# Patient Record
Sex: FEMALE | Race: BLACK OR AFRICAN AMERICAN | NOT HISPANIC OR LATINO | Employment: OTHER | URBAN - METROPOLITAN AREA
[De-identification: names, ages, dates, MRNs, and addresses within clinical notes are randomized per-mention and may not be internally consistent; named-entity substitution may affect disease eponyms.]

---

## 2017-01-17 ENCOUNTER — TELEPHONE (OUTPATIENT)
Dept: NEUROLOGY | Facility: CLINIC | Age: 62
End: 2017-01-17

## 2017-01-17 NOTE — TELEPHONE ENCOUNTER
Spoke with staff at [facility] where pt is located. Notified them of need to r/s upcoming appt with Dr. Varghese. New appt day/time confirmed for 1/30/17 @ 10:20am.

## 2017-01-20 ENCOUNTER — TELEPHONE (OUTPATIENT)
Dept: ORTHOPEDICS | Facility: CLINIC | Age: 62
End: 2017-01-20

## 2017-01-30 ENCOUNTER — OFFICE VISIT (OUTPATIENT)
Dept: NEUROLOGY | Facility: CLINIC | Age: 62
End: 2017-01-30
Payer: MEDICARE

## 2017-01-30 ENCOUNTER — TELEPHONE (OUTPATIENT)
Dept: NEUROLOGY | Facility: CLINIC | Age: 62
End: 2017-01-30

## 2017-01-30 VITALS
DIASTOLIC BLOOD PRESSURE: 106 MMHG | SYSTOLIC BLOOD PRESSURE: 177 MMHG | HEIGHT: 65 IN | HEART RATE: 103 BPM | BODY MASS INDEX: 20.33 KG/M2 | WEIGHT: 122 LBS

## 2017-01-30 DIAGNOSIS — R42 VERTIGO, CONSTANT: ICD-10-CM

## 2017-01-30 DIAGNOSIS — F33.0 MILD EPISODE OF RECURRENT MAJOR DEPRESSIVE DISORDER: Primary | ICD-10-CM

## 2017-01-30 DIAGNOSIS — G21.9 SECONDARY PARKINSONISM, UNSPECIFIED SECONDARY PARKINSONISM TYPE: ICD-10-CM

## 2017-01-30 DIAGNOSIS — F03.90 DEMENTIA WITHOUT BEHAVIORAL DISTURBANCE, UNSPECIFIED DEMENTIA TYPE: Chronic | ICD-10-CM
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
## 2017-01-30 PROCEDURE — 99214 OFFICE O/P EST MOD 30 MIN: CPT | Mod: S$PBB,,, | Performed by: PSYCHIATRY & NEUROLOGY

## 2017-01-30 PROCEDURE — 99999 PR PBB SHADOW E&M-EST. PATIENT-LVL III: CPT | Mod: PBBFAC,,, | Performed by: PSYCHIATRY & NEUROLOGY

## 2017-01-30 PROCEDURE — 99213 OFFICE O/P EST LOW 20 MIN: CPT | Mod: PBBFAC | Performed by: PSYCHIATRY & NEUROLOGY

## 2017-01-30 RX ORDER — MIRTAZAPINE 15 MG/1
15 TABLET, FILM COATED ORAL NIGHTLY
Qty: 30 TABLET | Refills: 11 | Status: SHIPPED | OUTPATIENT
Start: 2017-01-30 | End: 2017-02-07 | Stop reason: SDUPTHER

## 2017-01-30 RX ORDER — DULOXETIN HYDROCHLORIDE 30 MG/1
30 CAPSULE, DELAYED RELEASE ORAL DAILY
Refills: 4 | COMMUNITY
Start: 2017-01-21 | End: 2017-08-22

## 2017-01-30 NOTE — PROGRESS NOTES
"Last Name: Cousins   I. Chief Complaints during this visit:   f/u visit for parkinsonism, seizures and possible FTD    History of present illness:   61 y.o. W seen in f/u for dementia and seizures.  Not seen in 2.5 years.  She has been sent back to Neurology by PCP after suffering finger fracture in November from a fall or seizure.  She had not wanted to see me, again, and was set up to see Dr. Varghese today, but arrived 30 minutes late.    Main complaint is dizziness every morning and feeling off balance.    Says she is very depressed and not sure what the cymbalta is doing.    Seizures are controlled as long as she takes the keppra.    Drinks occasionally, but says it would "knock me out" if she drank at night.    No appetite.    Interval history 7/22/14:  59 yo W returns in f/u for above.  She is unaccompanied.  Moving into an assisted living and really stressed by all this.  Falling constantly and "always" spinning.  Has to stop and wait before she stands up in order to get her balance.  No appetite.    Wakes up on floor after presumed seizure; last was 3 days ago.  Hit head pretty bad.  Forgetful.  She takes more keppra than prescribed because it helps "settle me down."  Benzos gave her nausea.    From my note 9/28/12:  57 yo W seen urgently after a "seizure" last week. She says this is the third in one year, all resulting in broken bones. All start with her feeling a "tornado" in her head, then losing control and falling to ground. The other two episodes, she maintained conciousness, but this one last week was in the shower, so she lost conciousness as she struck her head. She awoke some time later in a "pool of blood" but did not manage to call anyone until she called our office the next morning. She refused EMS service and, instead, took a taxi to ER where a broken jaw was stitched up. She was advised that it would need wriring together, but she now says she thinks it will heal on it's own.   She is very " "private person (she restates) and does not want to be "a bother" to anyone. She does recognize that she cannot stay in her current living situation and so is planning on selling home and buying a condo. Refuses to live with anyone or in a home at this time.   She has recently found that 2 sinemet are more effective than one on controlling her tremor.       II. Review of systems As in HPI, otherwise, balance 3 systems reviewed and are negative.   III. Past Medical history: Parkinsonism, possible FTD ; HTN; B12 deficiency (but not adherant/taking supplements)  Family history: no PD or tremor, no gait disorders   Social history: No tob or etoh,  for 30 years,   unexpectedly in .       Current Outpatient Prescriptions   Medication Sig Dispense Refill    b complex vitamins tablet Take 1 tablet by mouth once daily.      cyanocobalamin (VITAMIN B-12) 1000 MCG tablet Take 100 mcg by mouth once daily.        diazepam (VALIUM) 2 MG tablet TAKE 1 TABLET BY MOUTH 3 TIMES A DAY AS NEEDED FOR ANXIETY 90 tablet 4    folic acid (FOLVITE) 800 MCG Tab Take 800 mcg by mouth once daily.        levetiracetam (KEPPRA) 1000 MG tablet TAKE 1 TABLET BY MOUTH TWICE A DAY 60 tablet 6    acetaminophen (TYLENOL) 500 MG tablet Take 1 tablet (500 mg total) by mouth every 6 (six) hours as needed for Pain. 20 tablet 0    duloxetine (CYMBALTA) 30 MG capsule Take 30 mg by mouth once daily.  4    naproxen sodium (ANAPROX) 220 MG tablet Take 220 mg by mouth every 12 (twelve) hours.       No current facility-administered medications for this visit.           Medications as of 2014:  cymbalta 20 qhs  keppra 500 prescribed bid, but takes up to 4/day      Allergies: Per patient/ family report: nkda     Physical Exam   Vitals:    17 1117 17 1147 17 1150   BP: (!) 189/109 (!) 177/100 (!) 177/106   BP Location: Right arm     Patient Position: Sitting Sitting Standing   BP Method: Automatic     Pulse: 103   " "  Weight: 55.3 kg (122 lb)     Height: 5' 5" (1.651 m)          General appearance: thin, well developed, no acute distress????????  -------------------------------------------------------------  Facial Expression:   1: Slight: Minimal masked facies manifested only by decreased frequency of blinking.  ??  Affect: full?????  Orientation to time & place:  Oriented to time, place, person and situation?????  Attention & concentration:  Normal attention span and concentration?????  Memory:  Not tested  Language:   Spontaneous, fluent; able to repeat and name objects?????   Fund of knowledge:  Aware of current events ?????  Speech:   Mild slurring  -------------------------------------------------------  Cranial nerves: normal visual acuity, visual fields full, optic discs not well visualized due small pupils, pupils equal round and reactive, extraocular movements intact, facial sensation intact, face symmetrical, hearing intact to whisper, palate raises midline, shoulder shrug strength normal, tongue protrudes midline. ?????  -------------------------------------------------------  Muscle rigidity:  Neck     2: Mild: Rigidity detected without the activation maneuver, but full range of motion is easily  achieved.   RUE       0: Normal: No rigidity.  LUE      0: Normal: No rigidity.  RLE      0: Normal: No rigidity.  LLE      0: Normal: No rigidity.    --------------------------------------------------------------  Gait:  Slow and cautious, but off-balance.  Low step height?  Freezing of gait:    0: Normal: No freezing.  Pronation/supination hands:         RUE   0: Normal: No problems.        LUE    0: Normal: No problems.  --------------------------------------------------------------  Bradykinesia: 1: Slight: Slight global slowness and poverty of spontaneous movements.  ??  Dyskinesias:  No dyskinesias present.    Amplitude of rest tremor (visibly full body tremulousness):        RUE    1: Slight: Tremor is present but " less than 1 cm in amplitude. .       LUE     1: Slight: Tremor is present but less than 1 cm in amplitude.       RLE      1: Slight: Tremor is present but less than 1 cm in amplitude.       LLE      1: Slight: Tremor is present but less than 1 cm in amplitude.   Lip/Jaw:    1: Slight: Tremor is present but less than 1 cm in amplitude.    Constancy of rest tremor: 4: Severe: Tremor at rest is present > 75% of the entire examination period.   Postural tremor (hands):         RUE    1: Slight: Tremor is present but less than 1 cm in amplitude.        LUE      1: Slight: Tremor is present but less than 1 cm in amplitude.       Action tremor (hands):          RUE     0: Normal: No tremor.       LUE      0: Normal: No tremor.     Arising from chair:   1: Slight: Arising is slower than normal; or may need more than one attempt; or may  need to move forward in the chair to arise. No need to use the arms of the chair.   Posture:  0: Normal: No problems.  --------------------------------------------------------------  Finger Taps:        RUE    Significant apraxia, difficulty coordinating movements.       LUE     0: Normal: No problems.    Hand Movements (open/close):        RUE   Significant apraxia, difficulty coordinating movements       LUE     0: Normal: No problems.    Toe Tapping:       RLE    0: Normal: No problems.       LLE     0: Normal: No problems.    Leg Agility:        RLE    0: Normal: No problems.       LLE     0: Normal: No problems.    Postural stability: not tested (spontaneously off balance)      V. Laboratory/ Radiological Data:    MRI brain 2/8/15:  1.  No evidence of acute infarction, parenchymal hemorrhage, or abnormal enhancement.  2.  Cerebral volume loss, greater than expected for chronological age but similar to the prior examination.              From my note 9/13/11:  7/18/11 Neuropsych testing:  SUMMARY AND RECOMMENDATIONS:   Neuropsychological testing is consistent with moderate dementia  affecting visuospatial/constructional abilities, attention, naming, psychomotor speed, and frontal lobe abilities (verbal fluency, abstract reasoning, mental flexibility), with depression and anxiety. Memory functioning is also affected and varies from mildly to severely impaired on immediate recall and from low average to mildly impaired on delayed recall. Thus there is prominent impairment in frontal lobe abilities in addition to impairment in most other cognitive abilities.     4/14/10 xray right arm: THERE IS A MINOR IRREGULARITY IDENTIFIED INVOLVING THE   ARTICULAR SURFACE OF THE RADIAL HEAD, BUT I BELIEVE THAT THIS RELATES TO   AN OLD HEALED FRACTURE, REPRESENTING AN ABNORMALITY WHICH WAS ACUTE IN   OCTOBER/NOVEMBER OF 2009    From my note 10/18/10:  MRI brain 10/1/10: IMPRESSION:   1. CEREBRAL VOLUME LOSS WHICH IS PROMINENT FOR THE PATIENT'S CHRONOLOGIC   AGE AND WHICH PRIMARILY AFFECTS THE FRONTAL LOBES RESULTING IN PROMINENCE   OF THE CSF SPACES OVER BOTH FRONTAL CONVEXITIES.   2. FLAIR SIGNAL HYPERINTENSITY WITHIN THE PERIVENTRICULAR WHITE MATTER   ABOUT THE FRONTAL LOBES WHICH IS FAVORED TO REPRESENT AGE APPROPRIATE   CHRONIC MICROVASCULAR ISCHEMIC CHANGE.     CT head 9/17/10 (personally reviewed): moderate bilateral frontal and temporal atrophy     10/12/2010   Vitamin B12 693 210-950 pg/ml   Folate 11.8 4.0-24.0 ng/ml   APA Isotype IgG <9.4 0.0-14.9 GPL   APA Isotype IgM <9.4 0.0-12.4 MPL   Myeloperoxidase Antibo <0.2 U   T4, Free 0.84 0.71-1.51 ng/dl   TSH 0.423 0.4-4.0 uIU/ml   T3, Free 2.2 L 2.3-4.2 pg/mL     VI. Medical Decision Making   Diagnosis:  As below     Tests ordered during this visit: none    Assessment:   1.  Imbalance, tremulousness (parkinsonism) but unable to tolerate levodopa.  Worsening dizziness, but she has not had a significant progression in symptoms since seen 2 years ago.    2.  Depression, anorexia, insomnia, uncontrolled.  3. Seizures, controlled  4. Progressive  neurodegenerative disorder with dementia by prior neuropsych testing (2011), but I remain skeptical since last visit due to lack of clear progression.  I suspect that depression, seizures and etoh contribute more to her cognitive problems than FTD.  5.  Hypertension, uncontrolled    Treatment plan:   1. Start remeron  2. Repeat neuropsych (patient declines currently, will think about it).  3. Return to PCP      Follow up: 4 weeks

## 2017-01-30 NOTE — TELEPHONE ENCOUNTER
----- Message from Mateo Cheung sent at 1/30/2017  2:48 PM CST -----  Contact: Pharmacy   States patient called into he pharmacy, saying she is suppose to have prescription called in.     They did not receive request they would like an update.    Call: [phone]

## 2017-01-31 ENCOUNTER — TELEPHONE (OUTPATIENT)
Dept: INTERNAL MEDICINE | Facility: CLINIC | Age: 62
End: 2017-01-31

## 2017-01-31 NOTE — TELEPHONE ENCOUNTER
Caroline states she is returning a call to the office. Caroline states someone named Lacey called stating she needs to call ASAP in regards to pt. No documentation of missed calls. Caroline states she has been unable to contact pt. Caroline would like it noted that she indeed tried to reach out to Lacey concerning pt. Caroline would like to receive calls on her cell phone regarding pt at [phone].   PCP please advise if there are concerns to be addressed.

## 2017-02-01 ENCOUNTER — TELEPHONE (OUTPATIENT)
Dept: NEUROLOGY | Facility: CLINIC | Age: 62
End: 2017-02-01

## 2017-02-01 NOTE — TELEPHONE ENCOUNTER
----- Message from Edwin Rodriguez sent at 2/1/2017  9:38 AM CST -----  Contact: Self [phone]  Patient is calling to get an update on the medication, pls call to update

## 2017-02-06 ENCOUNTER — TELEPHONE (OUTPATIENT)
Dept: ORTHOPEDICS | Facility: CLINIC | Age: 62
End: 2017-02-06

## 2017-02-06 ENCOUNTER — TELEPHONE (OUTPATIENT)
Dept: NEUROLOGY | Facility: CLINIC | Age: 62
End: 2017-02-06

## 2017-02-06 NOTE — TELEPHONE ENCOUNTER
----- Message from Edwin Rodriguez sent at 2/2/2017  8:05 AM CST -----  Contact: Self [phone]  Patient is calling to get an update on the new medication, pls call

## 2017-02-06 NOTE — TELEPHONE ENCOUNTER
----- Message from Willa Martinez sent at 2/6/2017 10:39 AM CST -----  Contact: Patient  Patient called and said she needs an update on what medication Dr. Mccray was going to give her as discussed at their last appointment.    Please call her about this [phone]

## 2017-02-06 NOTE — TELEPHONE ENCOUNTER
Breonna Silva reminded of appointment on 2/7/17 with Dr. JOSE DAVID Kelly w/time and location. Notified of need for xray before OV w/date, time, and location of appts.

## 2017-02-06 NOTE — TELEPHONE ENCOUNTER
----- Message from Edwin Rodriguez sent at 2/2/2017  8:18 AM CST -----  Contact: Cathy with [facility] [phone]        [address] - [place], [place] - [address] [place] [id number]  Phone: [phone] Fax: [phone]  Caller is calling to get an update on medication, pls call     [facility]/pharmacy #[id number] - [place], [place] - [address] [address]  Phone: [phone] Fax: [phone]

## 2017-02-07 RX ORDER — MIRTAZAPINE 15 MG/1
15 TABLET, FILM COATED ORAL NIGHTLY
Qty: 30 TABLET | Refills: 11 | Status: SHIPPED | OUTPATIENT
Start: 2017-02-07 | End: 2018-02-10 | Stop reason: SDUPTHER

## 2017-02-07 NOTE — TELEPHONE ENCOUNTER
----- Message from Marlen Kiran MD sent at 2/6/2017  7:28 PM CST -----  Contact: Patient  [place], I believe this is for you. Marlen  ----- Message -----     From: Manina Heriberto     Sent: 2/6/2017   5:23 PM       To: Qiana IVERSON Staff    Patient would like a call from Dr. Mccray or nurse in regards to refilling a prescription   (KEPPA) 1 mg twice a day  Was told it would be increased in dosage.  Patient has Parkinson.  Still has dizziness, no appetite. Feet are swelling and having a lot of headaches.

## 2017-02-07 NOTE — TELEPHONE ENCOUNTER
----- Message from Edwin Rodriguez sent at 2/6/2017  3:06 PM CST -----  Contact: Self [phone]  Patient is requesting a return call from the nurse about increasing the medication,  pls call ASAP

## 2017-02-21 ENCOUNTER — INITIAL CONSULT (OUTPATIENT)
Dept: ORTHOPEDICS | Facility: CLINIC | Age: 62
End: 2017-02-21
Payer: MEDICARE

## 2017-02-21 ENCOUNTER — HOSPITAL ENCOUNTER (OUTPATIENT)
Dept: RADIOLOGY | Facility: OTHER | Age: 62
Discharge: HOME | End: 2017-02-21
Attending: ORTHOPAEDIC SURGERY
Payer: MEDICARE

## 2017-02-21 VITALS
DIASTOLIC BLOOD PRESSURE: 99 MMHG | SYSTOLIC BLOOD PRESSURE: 156 MMHG | WEIGHT: 121.94 LBS | BODY MASS INDEX: 20.32 KG/M2 | HEIGHT: 65 IN | HEART RATE: 98 BPM

## 2017-02-21 DIAGNOSIS — G56.01 CARPAL TUNNEL SYNDROME OF RIGHT WRIST: Primary | ICD-10-CM

## 2017-02-21 DIAGNOSIS — M24.541 CONTRACTURE OF FINGER JOINT, RIGHT: ICD-10-CM

## 2017-02-21 DIAGNOSIS — T14.8XXA FRACTURE: ICD-10-CM

## 2017-02-21 PROCEDURE — 99999 PR PBB SHADOW E&M-EST. PATIENT-LVL III: CPT | Mod: PBBFAC,,, | Performed by: ORTHOPAEDIC SURGERY

## 2017-02-21 PROCEDURE — 99213 OFFICE O/P EST LOW 20 MIN: CPT | Mod: PBBFAC | Performed by: ORTHOPAEDIC SURGERY

## 2017-02-21 PROCEDURE — 73130 X-RAY EXAM OF HAND: CPT | Mod: 26,RT,, | Performed by: RADIOLOGY

## 2017-02-21 PROCEDURE — 99204 OFFICE O/P NEW MOD 45 MIN: CPT | Mod: S$PBB,,, | Performed by: ORTHOPAEDIC SURGERY

## 2017-02-21 NOTE — MR AVS SNAPSHOT
[facility]  [address]  [address]  [facility] [address]  Phone: [phone]                  Breonna Silva   2017 1:30 PM   Initial consult    Description:  Female : 1955   Provider:  Johana Kelly MD   Department:  [facility]           Reason for Visit     Right Hand - Numbness, Pain           Diagnoses this Visit        Comments    Carpal tunnel syndrome of right wrist    -  Primary            To Do List           Future Appointments        Provider Department Dept Phone    3/3/2017 1:20 PM Jarrell Del Valle MD [facility] [place] [phone]    3/13/2017 10:40 AM Jennifer Mccray MD [facility] [phone]      Goals (5 Years of Data)     None      [facility]     [facility] -  Assistance  Registered nurses in the [facility] provide clinical advisement, health education, appointment booking, and other advisory services.  Call for this free service at [phone].             Medications           Message regarding Medications     Verify the changes and/or additions to your medication regime listed below are the same as discussed with your clinician today.  If any of these changes or additions are incorrect, please notify your healthcare provider.             Verify that the below list of medications is an accurate representation of the medications you are currently taking.  If none reported, the list may be blank. If incorrect, please contact your healthcare provider. Carry this list with you in case of emergency.           Current Medications     acetaminophen (TYLENOL) 500 MG tablet Take 1 tablet (500 mg total) by mouth every 6 (six) hours as needed for Pain.    b complex vitamins tablet Take 1 tablet by mouth once daily.    cyanocobalamin (VITAMIN B-12) 1000 MCG tablet Take 100 mcg by mouth once daily.      diazepam (VALIUM) 2 MG tablet TAKE 1 TABLET BY MOUTH 3 TIMES A DAY AS NEEDED  "FOR ANXIETY    duloxetine (CYMBALTA) 30 MG capsule Take 30 mg by mouth once daily.    folic acid (FOLVITE) 800 MCG Tab Take 800 mcg by mouth once daily.      levetiracetam (KEPPRA) 1000 MG tablet TAKE 1 TABLET BY MOUTH TWICE A DAY    mirtazapine (REMERON) 15 MG tablet Take 1 tablet (15 mg total) by mouth every evening.    naproxen sodium (ANAPROX) 220 MG tablet Take 220 mg by mouth every 12 (twelve) hours.           Clinical Reference Information           Your Vitals Were     BP Pulse Height Weight BMI    156/99 (BP Location: Left arm) 98 5' 5" (1.651 m) 55.3 kg (121 lb 14.6 oz) 20.29 kg/m2      Blood Pressure          Most Recent Value    BP  (!)  156/99      Allergies as of 2/21/2017     No Known Allergies      Immunizations Administered on Date of Encounter - 2/21/2017     None      Orders Placed During Today's Visit      Normal Orders This Visit    Ambulatory Referral to Physical/Occupational Therapy     Future Labs/Procedures Expected by Expires    EMG w/ Ultrasound  As directed 2/21/2018      Language Assistance Services     ATTENTION: Language assistance services are available, free of charge. Please call [phone].      ATENCIÓN: Si blue mcguire, tiene a leblanc disposición servicios gratuitos de asistencia lingüística. Llame al [phone].     CHÚ Ý: N?u b?n nói Ti?ng Vi?t, có các d?ch v? h? tr? ngôn ng? mi?n phí dành cho b?n. G?i s? [phone].         [facility] complies with applicable Federal civil rights laws and does not discriminate on the basis of race, color, national origin, age, disability, or sex.        "

## 2017-02-21 NOTE — LETTER
February 22, 2017      Jarrell Del Valle MD  [address]  [facility] [address]           [facility]  [address]  [address]  [facility] [address]  Phone: [phone]          Patient: Breonna Silva   MR Number: [id number]   YOB: 1955   Date of Visit: 2/21/2017       Dear Dr. Jarrell Del Valle:    Thank you for referring Breonna Silva to me for evaluation. Attached you will find relevant portions of my assessment and plan of care.    If you have questions, please do not hesitate to call me. I look forward to following Breonna Silva along with you.    Sincerely,    Sona Odom PA-C    Enclosure  CC:  No Recipients    If you would like to receive this communication electronically, please contact [email or web address] or [phone] to request more information on [facility] Link access.    For providers and/or their staff who would like to refer a patient to [facility], please contact us through our one-stop-shop provider referral line, [facility], at [phone].    If you feel you have received this communication in error or would no longer like to receive these types of communications, please e-mail [email or web address]

## 2017-02-22 NOTE — PROGRESS NOTES
I have personally taken the history and examined the patient. I agree with the Hand Surgery PA's note. The plan will be OT for right hand. EMG/ NCS for right hand. Pt is not interested in surgical correction of right hand ( 4th MC head) at this time. Would prefer conservative management. Pt will also be scheduled with her internal medicine doctor for other issues.

## 2017-02-22 NOTE — PROGRESS NOTES
This office note has been dictated.   Dictation Confirmation Code: [id number]  Oneida Odom PA-C  02/22/2017  12:40 PM  Supervising Physician:  MD Breonna Campoverde was seen today for numbness and pain.    Diagnoses and all orders for this visit:    Carpal tunnel syndrome of right wrist  -     Ambulatory Referral to Physical/Occupational Therapy  -     EMG w/ Ultrasound; Future    Fracture    Contracture of finger joint, right
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
## 2017-03-03 RX ORDER — LISINOPRIL 10 MG/1
10 TABLET ORAL DAILY
Refills: 3 | COMMUNITY
Start: 2017-01-07 | End: 2017-12-02

## 2017-04-10 RX ORDER — DIAZEPAM 2 MG/1
TABLET ORAL
Qty: 90 TABLET | Refills: 4 | Status: SHIPPED | OUTPATIENT
Start: 2017-04-10 | End: 2017-11-29 | Stop reason: SDUPTHER

## 2017-04-11 NOTE — TELEPHONE ENCOUNTER
----- Message from Sujeychristen De La Torre sent at 4/10/2017  1:09 PM CDT -----  Contact: pt [phone]  Pt called requesting to speak to Dr Mccray. Pt stated that she has some questions about medications.  Increased dizziness, therefore becoming more depressed.    Thanks  le

## 2017-04-11 NOTE — TELEPHONE ENCOUNTER
----- Message from Nathalie Power sent at 4/10/2017 12:34 PM CDT -----  Contact: Patient [phone]  Patient is calling to r/s No Show appt she missed on Monday 3/13/17, patient says she is depressed and would like to speak to Dr. Mccray.

## 2017-04-27 ENCOUNTER — PROCEDURE VISIT (OUTPATIENT)
Dept: NEUROLOGY | Facility: CLINIC | Age: 62
End: 2017-04-27
Payer: MEDICARE

## 2017-04-27 DIAGNOSIS — G56.01 CARPAL TUNNEL SYNDROME OF RIGHT WRIST: ICD-10-CM

## 2017-04-27 PROCEDURE — 95886 MUSC TEST DONE W/N TEST COMP: CPT | Mod: 26,S$PBB,, | Performed by: PSYCHIATRY & NEUROLOGY

## 2017-04-27 PROCEDURE — 95909 NRV CNDJ TST 5-6 STUDIES: CPT | Mod: 26,S$PBB,, | Performed by: PSYCHIATRY & NEUROLOGY

## 2017-04-27 PROCEDURE — 95886 MUSC TEST DONE W/N TEST COMP: CPT | Mod: PBBFAC | Performed by: PSYCHIATRY & NEUROLOGY

## 2017-04-27 PROCEDURE — 95909 NRV CNDJ TST 5-6 STUDIES: CPT | Mod: PBBFAC | Performed by: PSYCHIATRY & NEUROLOGY

## 2017-05-05 ENCOUNTER — TELEPHONE (OUTPATIENT)
Dept: NEUROLOGY | Facility: CLINIC | Age: 62
End: 2017-05-05

## 2017-05-05 NOTE — TELEPHONE ENCOUNTER
Left VM requesting callback to discuss concern over meds causing GI upset. Also, spoke with her POA, Ms. Garcia (emergency contact also) and received contact # for [facility] assisted [facility] where the pt is located [phone]. Will await callback form pt.

## 2017-05-05 NOTE — TELEPHONE ENCOUNTER
----- Message from Prudence Mittal sent at 5/4/2017 11:40 AM CDT -----  Contact: Self  Pt is calling because she says she has been vomiting and stomach upset and wants to know if there is anything that can be prescribed for her.    She can be reached at [phone].    Thank you.

## 2017-05-08 ENCOUNTER — NURSE TRIAGE (OUTPATIENT)
Dept: ADMINISTRATIVE | Facility: CLINIC | Age: 62
End: 2017-05-08

## 2017-05-09 ENCOUNTER — TELEPHONE (OUTPATIENT)
Dept: NEUROLOGY | Facility: CLINIC | Age: 62
End: 2017-05-09

## 2017-05-09 NOTE — TELEPHONE ENCOUNTER
----- Message from Valery Castellano sent at 5/8/2017  7:28 PM CDT -----  Contact: pt  Pt called and states she was returning your phone call and would like for you to call her back in regards to the message. Pt can be reached at [phone].

## 2017-05-11 ENCOUNTER — TELEPHONE (OUTPATIENT)
Dept: INTERNAL MEDICINE | Facility: CLINIC | Age: 62
End: 2017-05-11

## 2017-05-11 NOTE — TELEPHONE ENCOUNTER
"----- Message from Mary Ellen Benton sent at 5/11/2017 12:08 PM CDT -----  Contact: Patient herself  X  1st Request  _  2nd Request  _  3rd Request    Who: Breonna Shawmelvinas (mrn# [id number])    Why: Patient called and said, "for the pass few day she's been feeling some kind of way."   I did offer an appointment to be seen today but she refused.  Patient wasn't very clear. Please give a call back at your earliest convenience.   THANKS!    What Number to Call Back:  [phone]    When to Expect a call back: (Before the end of the day)   -- if the call is after 12:00, the call back will be tomorrow.                        "

## 2017-05-29 ENCOUNTER — TELEPHONE (OUTPATIENT)
Dept: ORTHOPEDICS | Facility: CLINIC | Age: 62
End: 2017-05-29

## 2017-05-31 ENCOUNTER — OFFICE VISIT (OUTPATIENT)
Dept: ORTHOPEDICS | Facility: CLINIC | Age: 62
End: 2017-05-31
Payer: MEDICARE

## 2017-05-31 VITALS
RESPIRATION RATE: 16 BRPM | WEIGHT: 121 LBS | HEART RATE: 96 BPM | SYSTOLIC BLOOD PRESSURE: 168 MMHG | DIASTOLIC BLOOD PRESSURE: 110 MMHG | HEIGHT: 65 IN | BODY MASS INDEX: 20.16 KG/M2

## 2017-05-31 DIAGNOSIS — S62.339P: ICD-10-CM

## 2017-05-31 DIAGNOSIS — G90.511 COMPLEX REGIONAL PAIN SYNDROME TYPE 1 OF RIGHT UPPER EXTREMITY: Primary | ICD-10-CM

## 2017-05-31 PROCEDURE — 99214 OFFICE O/P EST MOD 30 MIN: CPT | Mod: S$PBB,,, | Performed by: PHYSICIAN ASSISTANT

## 2017-05-31 PROCEDURE — 99214 OFFICE O/P EST MOD 30 MIN: CPT | Mod: PBBFAC | Performed by: PHYSICIAN ASSISTANT

## 2017-05-31 PROCEDURE — 99999 PR PBB SHADOW E&M-EST. PATIENT-LVL IV: CPT | Mod: PBBFAC,,, | Performed by: PHYSICIAN ASSISTANT

## 2017-06-02 NOTE — PROGRESS NOTES
This office note has been dictated.   Dictation Confirmation Code: [id number]  Oneida Odom PA-C  06/02/2017  12:33 AM  Supervising Physician:  MD Breonna Campoverde was seen today for pain.    Diagnoses and all orders for this visit:    Complex regional pain syndrome type 1 of right upper extremity  -     Ambulatory Referral to Physical/Occupational Therapy  -     Ambulatory Referral to Pain Clinic    Fracture of metacarpal neck of right hand, closed, with malunion, subsequent encounter  -     Ambulatory Referral to Pain Clinic

## 2017-06-05 NOTE — PROGRESS NOTES
"SUBJECTIVE:  Ms. Silva presents today for followup evaluation after her EMG   study of her hand.  She reports that it is no better.  She is here today for   evaluation.  She would like to discuss the traumatic injury in which her   fracture occurred.  She reports that she has throbbing pain in the hand.  She   feels that she cannot use it.  Denies nausea, vomiting, fever or chills.    PHYSICAL EXAMINATION:  Vitals:    05/31/17 1339   BP: (!) 168/110   Pulse: 96   Resp: 16   Weight: 54.9 kg (121 lb)   Height: 5' 5" (1.651 m)   PainSc:   4   PainLoc: Hand       UPPER EXTREMITY EXAM:  There is still significant change in the color and   texture of the skin.  She is unable to make a full fist.  There is deformity   seen at the fourth MCP.  She has very limited range of motion.    ASSESSMENT:  1.  Probable CRPS type 1 of the right hand.  2.  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    EMG:  EMG study shows no electrophysiologic evidence for right median neuropathy   at the wrist.  The tremor noted in the neuro exam is consistent with patient's   diagnosis of Parkinson disease.    PLAN:  Discussed with Ms. Silva. At this time, her EMG does not show any   evidence of carpal tunnel.  At this time due to the traumatic injury of her   hand, we would like to have her evaluated for possible CRPS.  She does need to   begin therapy for range of motion modalities.  She may benefit from a stellate   ganglion block.  As far as the metacarpal neck fracture, we discussed that at   this time due to a shortening and nonunion, likely she would only be able to   benefit from an arthroplasty; however, due to her Parkinson's as well as another   complex issues.  I do not recommend her for surgical intervention.  The patient   agrees that she does not want any surgery.  She will follow up with me in eight   weeks.    SUPERVISING PHYSICIAN:  Johana Kelly M.D.    DICTATED BY:  Sona BASILIO/STEVE  dd: " 06/02/2017 00:36:34 (NIYAHT)  td: 06/02/2017 23:41:35 (SHELBI)  Doc ID   #[id number]  Job ID #[id number]    CC:

## 2017-06-10 RX ORDER — LEVETIRACETAM 1000 MG/1
TABLET ORAL
Qty: 60 TABLET | Refills: 6 | Status: CANCELLED | OUTPATIENT
Start: 2017-06-10

## 2017-06-14 RX ORDER — LEVETIRACETAM 1000 MG/1
1000 TABLET ORAL 2 TIMES DAILY
Qty: 60 TABLET | Refills: 6 | Status: SHIPPED | OUTPATIENT
Start: 2017-06-14 | End: 2018-03-01 | Stop reason: SDUPTHER

## 2017-06-14 NOTE — TELEPHONE ENCOUNTER
----- Message from Indira Curran sent at 6/14/2017 10:00 AM CDT -----  Contact: Viviane with [facility]  [facility] is requesting a refill on Levetiracetam 1000mg    [facility] states this is the 2nd request    Contact number [phone] (Phone)  [phone] (Fax)  Thanks

## 2017-06-22 ENCOUNTER — OFFICE VISIT (OUTPATIENT)
Dept: PAIN MEDICINE | Facility: CLINIC | Age: 62
End: 2017-06-22
Attending: ANESTHESIOLOGY
Payer: MEDICARE

## 2017-06-22 VITALS
RESPIRATION RATE: 18 BRPM | SYSTOLIC BLOOD PRESSURE: 150 MMHG | HEART RATE: 94 BPM | DIASTOLIC BLOOD PRESSURE: 95 MMHG | HEIGHT: 65 IN | TEMPERATURE: 98 F

## 2017-06-22 DIAGNOSIS — G56.41 COMPLEX REGIONAL PAIN SYNDROME TYPE 2 OF RIGHT UPPER EXTREMITY: ICD-10-CM

## 2017-06-22 PROCEDURE — 99999 PR PBB SHADOW E&M-EST. PATIENT-LVL III: CPT | Mod: PBBFAC,,, | Performed by: ANESTHESIOLOGY

## 2017-06-22 PROCEDURE — 99213 OFFICE O/P EST LOW 20 MIN: CPT | Mod: PBBFAC | Performed by: ANESTHESIOLOGY

## 2017-06-22 PROCEDURE — 99204 OFFICE O/P NEW MOD 45 MIN: CPT | Mod: S$PBB,,, | Performed by: ANESTHESIOLOGY

## 2017-06-22 RX ORDER — DULOXETIN HYDROCHLORIDE 60 MG/1
60 CAPSULE, DELAYED RELEASE ORAL DAILY
Qty: 30 CAPSULE | Refills: 11 | Status: SHIPPED | OUTPATIENT
Start: 2017-06-22 | End: 2017-09-18
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
## 2017-06-22 NOTE — LETTER
June 22, 2017      Johana Kelly MD  [address]  [address]  [facility] [place] [address]           [facility]  [address]  [place] [address]  Phone: [phone]  Fax: [phone]          Patient: Breonna Silva   MR Number: [id number]   YOB: 1955   Date of Visit: 6/22/2017       Dear Dr. Johana Kelly:    Thank you for referring Breonna Silva to me for evaluation. Attached you will find relevant portions of my assessment and plan of care.    If you have questions, please do not hesitate to call me. I look forward to following Breonna Silva along with you.    Sincerely,    Brian Atkins MD    Enclosure  CC:  No Recipients    If you would like to receive this communication electronically, please contact [email or web address] or [phone] to request more information on [facility] Link access.    For providers and/or their staff who would like to refer a patient to [facility], please contact us through our one-stop-shop provider referral line, [facility], at [phone].    If you feel you have received this communication in error or would no longer like to receive these types of communications, please e-mail [email or web address]

## 2017-06-29 ENCOUNTER — TELEPHONE (OUTPATIENT)
Dept: PAIN MEDICINE | Facility: CLINIC | Age: 62
End: 2017-06-29

## 2017-06-30 NOTE — TELEPHONE ENCOUNTER
----- Message from Eric Morrow MA sent at 6/22/2017  5:07 PM CDT -----  MD Eric Troy MA         ok   Previous Messages        ----- Message -----   From: Eric Morrow MA   Sent: 6/22/2017   9:41 AM   To: Mirtha Wilkins MD     Spoke with pt in clinic today to establishing care with you. She states her  past away about 3 years ago, she was  for 32 years. Also, her dog past away after that. She states she depressed with Anxiety and no will to be here. She was referred by [facility]. Please advise.

## 2017-07-17 ENCOUNTER — TELEPHONE (OUTPATIENT)
Dept: PAIN MEDICINE | Facility: CLINIC | Age: 62
End: 2017-07-17

## 2017-07-17 ENCOUNTER — OFFICE VISIT (OUTPATIENT)
Dept: PAIN MEDICINE | Facility: CLINIC | Age: 62
End: 2017-07-17
Attending: ANESTHESIOLOGY
Payer: MEDICARE

## 2017-07-17 ENCOUNTER — OFFICE VISIT (OUTPATIENT)
Dept: PAIN MEDICINE | Facility: CLINIC | Age: 62
End: 2017-07-17
Payer: MEDICARE

## 2017-07-17 VITALS — WEIGHT: 119.06 LBS | RESPIRATION RATE: 18 BRPM | BODY MASS INDEX: 19.83 KG/M2 | HEIGHT: 65 IN

## 2017-07-17 VITALS
TEMPERATURE: 99 F | HEIGHT: 65 IN | BODY MASS INDEX: 19.83 KG/M2 | DIASTOLIC BLOOD PRESSURE: 90 MMHG | HEART RATE: 93 BPM | WEIGHT: 119 LBS | SYSTOLIC BLOOD PRESSURE: 148 MMHG

## 2017-07-17 DIAGNOSIS — G56.41 COMPLEX REGIONAL PAIN SYNDROME TYPE 2 OF RIGHT UPPER EXTREMITY: Primary | ICD-10-CM

## 2017-07-17 DIAGNOSIS — F33.0 MDD (MAJOR DEPRESSIVE DISORDER), RECURRENT EPISODE, MILD: Primary | ICD-10-CM

## 2017-07-17 DIAGNOSIS — G89.4 CHRONIC PAIN DISORDER: ICD-10-CM

## 2017-07-17 PROCEDURE — 99204 OFFICE O/P NEW MOD 45 MIN: CPT | Mod: S$PBB,,, | Performed by: PSYCHIATRY & NEUROLOGY

## 2017-07-17 PROCEDURE — 99213 OFFICE O/P EST LOW 20 MIN: CPT | Mod: S$PBB,,, | Performed by: NURSE PRACTITIONER

## 2017-07-17 PROCEDURE — 99999 PR PBB SHADOW E&M-EST. PATIENT-LVL III: CPT | Mod: PBBFAC,,, | Performed by: NURSE PRACTITIONER

## 2017-07-17 PROCEDURE — 99999 PR PBB SHADOW E&M-EST. PATIENT-LVL III: CPT | Mod: PBBFAC,,, | Performed by: PSYCHIATRY & NEUROLOGY

## 2017-07-17 PROCEDURE — 99213 OFFICE O/P EST LOW 20 MIN: CPT | Mod: PBBFAC,27 | Performed by: PSYCHIATRY & NEUROLOGY

## 2017-07-17 PROCEDURE — 99213 OFFICE O/P EST LOW 20 MIN: CPT | Mod: PBBFAC | Performed by: NURSE PRACTITIONER

## 2017-07-17 NOTE — LETTER
July 21, 2017      Brian Atkins MD  [address]  [address]  [facility] [address]           [facility]  [address]  [facility] [address]  Phone: [phone]  Fax: [phone]          Patient: Breonna Silva   MR Number: [id number]   YOB: 1955   Date of Visit: 7/17/2017       Dear Dr. Brian Atkins:    Thank you for referring Breonna Silva to me for evaluation. Attached you will find relevant portions of my assessment and plan of care.    If you have questions, please do not hesitate to call me. I look forward to following Breonna Silva along with you.    Sincerely,    Kapil Laguerre  CC:  No Recipients    If you would like to receive this communication electronically, please contact [email or web address] or [phone] to request more information on [facility] Link access.    For providers and/or their staff who would like to refer a patient to [facility], please contact us through our one-stop-shop provider referral line, [facility], at [phone].    If you feel you have received this communication in error or would no longer like to receive these types of communications, please e-mail [email or web address]

## 2017-07-17 NOTE — TELEPHONE ENCOUNTER
Called and spoke with Loida at [facility] an schedule appt for patient with Dennys Cordova 8/17/17 for weekly therapy per Dr. Wilkins.  Also,schedule appt with Dr. Lerner pt has appointment 7/19/17.
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
## 2017-07-19 ENCOUNTER — TELEPHONE (OUTPATIENT)
Dept: NEUROLOGY | Facility: CLINIC | Age: 62
End: 2017-07-19

## 2017-07-19 NOTE — TELEPHONE ENCOUNTER
----- Message from Edwin Rodriguez sent at 7/19/2017  1:54 PM CDT -----  Contact: Miky with Pharmacy @ [phone]  Caller is calling to get an update on the new medication that should be replacing ( duloxetine (CYMBALTA )     [facility]/pharmacy #[id number] - [address] [address]  Phone: [phone] Fax: [phone]

## 2017-07-19 NOTE — TELEPHONE ENCOUNTER
Spoke to Madalyn who did not have any notes about the medication left by Cathy. Cathy got off at 3pm today. She will leave her a message and have her call us in the tomorrow.

## 2017-07-24 ENCOUNTER — LAB VISIT (OUTPATIENT)
Dept: LAB | Facility: OTHER | Age: 62
End: 2017-07-24
Attending: INTERNAL MEDICINE
Payer: MEDICARE

## 2017-07-24 ENCOUNTER — TELEPHONE (OUTPATIENT)
Dept: INTERNAL MEDICINE | Facility: CLINIC | Age: 62
End: 2017-07-24

## 2017-07-24 ENCOUNTER — OFFICE VISIT (OUTPATIENT)
Dept: INTERNAL MEDICINE | Facility: CLINIC | Age: 62
End: 2017-07-24
Payer: MEDICARE

## 2017-07-24 VITALS
OXYGEN SATURATION: 98 % | SYSTOLIC BLOOD PRESSURE: 112 MMHG | HEIGHT: 65 IN | DIASTOLIC BLOOD PRESSURE: 78 MMHG | HEART RATE: 69 BPM | WEIGHT: 122 LBS | BODY MASS INDEX: 20.33 KG/M2

## 2017-07-24 DIAGNOSIS — G21.9 SECONDARY PARKINSONISM, UNSPECIFIED SECONDARY PARKINSONISM TYPE: ICD-10-CM

## 2017-07-24 DIAGNOSIS — F03.90 DEMENTIA WITHOUT BEHAVIORAL DISTURBANCE, UNSPECIFIED DEMENTIA TYPE: Chronic | ICD-10-CM

## 2017-07-24 DIAGNOSIS — F32.1 MODERATE SINGLE CURRENT EPISODE OF MAJOR DEPRESSIVE DISORDER: ICD-10-CM

## 2017-07-24 DIAGNOSIS — R56.9 SEIZURE: ICD-10-CM

## 2017-07-24 DIAGNOSIS — I10 ESSENTIAL HYPERTENSION, BENIGN: Primary | Chronic | ICD-10-CM

## 2017-07-24 DIAGNOSIS — I10 ESSENTIAL HYPERTENSION, BENIGN: Chronic | ICD-10-CM

## 2017-07-24 LAB
ALBUMIN SERPL BCP-MCNC: 4.5 G/DL
ALP SERPL-CCNC: 80 U/L
ALT SERPL W/O P-5'-P-CCNC: 19 U/L
ANION GAP SERPL CALC-SCNC: 16 MMOL/L
AST SERPL-CCNC: 29 U/L
BASOPHILS # BLD AUTO: 0.01 K/UL
BASOPHILS NFR BLD: 0.2 %
BILIRUB SERPL-MCNC: 0.7 MG/DL
BUN SERPL-MCNC: 10 MG/DL
CALCIUM SERPL-MCNC: 9.1 MG/DL
CHLORIDE SERPL-SCNC: 103 MMOL/L
CO2 SERPL-SCNC: 25 MMOL/L
CREAT SERPL-MCNC: 0.8 MG/DL
DIFFERENTIAL METHOD: ABNORMAL
EOSINOPHIL # BLD AUTO: 0.1 K/UL
EOSINOPHIL NFR BLD: 1.5 %
ERYTHROCYTE [DISTWIDTH] IN BLOOD BY AUTOMATED COUNT: 14.1 %
EST. GFR  (AFRICAN AMERICAN): >60 ML/MIN/1.73 M^2
EST. GFR  (NON AFRICAN AMERICAN): >60 ML/MIN/1.73 M^2
GLUCOSE SERPL-MCNC: 103 MG/DL
HCT VFR BLD AUTO: 36.1 %
HGB BLD-MCNC: 11.9 G/DL
LYMPHOCYTES # BLD AUTO: 0.8 K/UL
LYMPHOCYTES NFR BLD: 17 %
MCH RBC QN AUTO: 33.5 PG
MCHC RBC AUTO-ENTMCNC: 33 G/DL
MCV RBC AUTO: 102 FL
MONOCYTES # BLD AUTO: 0.3 K/UL
MONOCYTES NFR BLD: 6.8 %
NEUTROPHILS # BLD AUTO: 3.4 K/UL
NEUTROPHILS NFR BLD: 74.3 %
PLATELET # BLD AUTO: 220 K/UL
PMV BLD AUTO: 10 FL
POTASSIUM SERPL-SCNC: 3.1 MMOL/L
PROT SERPL-MCNC: 7.6 G/DL
RBC # BLD AUTO: 3.55 M/UL
SODIUM SERPL-SCNC: 144 MMOL/L
WBC # BLD AUTO: 4.54 K/UL

## 2017-07-24 PROCEDURE — 85025 COMPLETE CBC W/AUTO DIFF WBC: CPT

## 2017-07-24 PROCEDURE — 80053 COMPREHEN METABOLIC PANEL: CPT

## 2017-07-24 PROCEDURE — 99214 OFFICE O/P EST MOD 30 MIN: CPT | Mod: S$PBB,,, | Performed by: INTERNAL MEDICINE

## 2017-07-24 PROCEDURE — 99999 PR PBB SHADOW E&M-EST. PATIENT-LVL III: CPT | Mod: PBBFAC,,, | Performed by: INTERNAL MEDICINE

## 2017-07-24 PROCEDURE — 36415 COLL VENOUS BLD VENIPUNCTURE: CPT

## 2017-07-24 NOTE — TELEPHONE ENCOUNTER
Inform pt that labs look good except potassium is a little low. Please have her increase foods with potassium such as citrus fruits, tomatoes, bananas.

## 2017-07-24 NOTE — PROGRESS NOTES
Subjective:       Patient ID: Breonna Silva is a 62 y.o. female.    Chief Complaint: Depression and Foot Swelling    Pt here for f/u. She has parkinson's but has not seen neuro recently. She was supposed to have neuropsych testing but declined at last visit. She has trouble remembering things and has a known dementia syndrome with suspect FTD. She is willing to see neuro again but would like to see someone else. She is living at assisted living facility where she says she administers her own medication.     She has dx Sz disorder. However, sz history is questionable and its relation to etoh intake remains possible. She takes keppra. No sz recently.      She reports depression and is on cymbalta but wonders if this really works. No SI/HI. She is seeing psychiatry.     Pt's BP is well controlled. Tolerating meds well. Pt denies cp/sob/ha/vision or neuro changes.     She had a fall prior to last visit and had soft tissue injury to L ankle but this is improved although she reports it does swell at times. Does not limit her.       Hypertension   Pertinent negatives include no chest pain or shortness of breath.     Review of Systems   Constitutional: Negative for fatigue and unexpected weight change.   Eyes: Negative for visual disturbance.   Respiratory: Negative for shortness of breath.    Cardiovascular: Negative for chest pain.   Gastrointestinal: Negative for blood in stool.   Neurological: Negative for weakness.   Psychiatric/Behavioral: Positive for dysphoric mood.       Objective:      Physical Exam   Constitutional: She is oriented to person, place, and time. She appears well-developed and well-nourished.   Eyes: EOM are normal. Pupils are equal, round, and reactive to light.   Neck: Neck supple. No thyromegaly present.   Cardiovascular: Normal rate, regular rhythm and normal heart sounds.    Pulmonary/Chest: Effort normal and breath sounds normal.   Musculoskeletal: She exhibits no edema.   Lymphadenopathy:      She has no cervical adenopathy.   Neurological: She is alert and oriented to person, place, and time. No cranial nerve deficit.   Psychiatric: Her affect is blunt. Her speech is delayed. She is slowed. Thought content is not paranoid. She expresses no suicidal ideation. She exhibits abnormal recent memory.       Assessment:       1. Essential hypertension, benign    2. Moderate single current episode of major depressive disorder    3. Dementia without behavioral disturbance, unspecified dementia type    4. Secondary Parkinsonism, unspecified secondary Parkinsonism type    5. Seizure        Plan:       1. Keep f/u with specialists including psychiatry  2. Appropriate labs  3. Neuro referral as pt would like to see different neuro for 2nd opinion

## 2017-07-26 ENCOUNTER — TELEPHONE (OUTPATIENT)
Dept: PAIN MEDICINE | Facility: CLINIC | Age: 62
End: 2017-07-26

## 2017-07-26 NOTE — TELEPHONE ENCOUNTER
----- Message from Violeta Lim sent at 7/26/2017 10:40 AM CDT -----  x 1st Request  _ 2nd Request  _ 3rd Request    Who: Breonna     Why: Pt has questions in regards to her procedure with Dr. Atkins on 8/1/17, BLOCK-STELLATE GANGLION. Please call and advise.     What Number to Call Back: [phone]     When to Expect a call back: (Before the end of the day)  -- if call after 3:00 call back will be tomorrow.       Staff returned patient call, no answer, left voicemail message

## 2017-08-01 ENCOUNTER — HOSPITAL ENCOUNTER (OUTPATIENT)
Facility: OTHER | Age: 62
Discharge: HOME | End: 2017-08-01
Attending: ANESTHESIOLOGY | Admitting: ANESTHESIOLOGY
Payer: MEDICARE

## 2017-08-01 VITALS
DIASTOLIC BLOOD PRESSURE: 108 MMHG | RESPIRATION RATE: 18 BRPM | HEIGHT: 64 IN | SYSTOLIC BLOOD PRESSURE: 210 MMHG | TEMPERATURE: 98 F | HEART RATE: 130 BPM | OXYGEN SATURATION: 100 %

## 2017-08-01 DIAGNOSIS — G56.41 COMPLEX REGIONAL PAIN SYNDROME TYPE 2 OF RIGHT UPPER EXTREMITY: Primary | ICD-10-CM

## 2017-08-01 PROCEDURE — 64510 N BLOCK STELLATE GANGLION: CPT | Mod: RT,,, | Performed by: ANESTHESIOLOGY

## 2017-08-01 PROCEDURE — 64510 N BLOCK STELLATE GANGLION: CPT | Performed by: ANESTHESIOLOGY

## 2017-08-01 PROCEDURE — 63600175 PHARM REV CODE 636 W HCPCS: Performed by: ANESTHESIOLOGY

## 2017-08-01 PROCEDURE — 99152 MOD SED SAME PHYS/QHP 5/>YRS: CPT | Mod: ,,, | Performed by: ANESTHESIOLOGY

## 2017-08-01 PROCEDURE — 25000003 PHARM REV CODE 250: Performed by: ANESTHESIOLOGY

## 2017-08-01 PROCEDURE — 76942 ECHO GUIDE FOR BIOPSY: CPT | Performed by: ANESTHESIOLOGY

## 2017-08-01 RX ORDER — LIDOCAINE HYDROCHLORIDE 10 MG/ML
INJECTION, SOLUTION EPIDURAL; INFILTRATION; INTRACAUDAL; PERINEURAL
Status: DISCONTINUED | OUTPATIENT
Start: 1840-12-31 | End: 2017-08-01 | Stop reason: HOSPADM

## 2017-08-01 RX ORDER — SODIUM CHLORIDE 9 MG/ML
INJECTION, SOLUTION INTRAVENOUS CONTINUOUS
Status: DISCONTINUED | OUTPATIENT
Start: 2017-08-01 | End: 2017-08-01 | Stop reason: HOSPADM

## 2017-08-01 RX ORDER — LIDOCAINE HYDROCHLORIDE 10 MG/ML
INJECTION INFILTRATION; PERINEURAL
Status: DISCONTINUED | OUTPATIENT
Start: 2017-08-01 | End: 2017-08-01 | Stop reason: HOSPADM

## 2017-08-01 RX ORDER — MIDAZOLAM HYDROCHLORIDE 1 MG/ML
INJECTION, SOLUTION INTRAMUSCULAR; INTRAVENOUS
Status: DISCONTINUED | OUTPATIENT
Start: 2017-08-01 | End: 2017-08-01 | Stop reason: HOSPADM

## 2017-08-01 RX ORDER — CLONIDINE 100 UG/ML
INJECTION, SOLUTION EPIDURAL
Status: DISCONTINUED | OUTPATIENT
Start: 2017-08-01 | End: 2017-08-01 | Stop reason: HOSPADM

## 2017-08-01 RX ADMIN — SODIUM CHLORIDE: 0.9 INJECTION, SOLUTION INTRAVENOUS at 11:08

## 2017-08-01 NOTE — PLAN OF CARE
Pt tolerated procedure well. Reports 0/10 pain after procedure. Pt assisted up for first time, steady on feet. D/c instructions given. Pt sat up x 45 minutes.  Pt tolerating PO fluids well and instructed on no solid foods x24 hours. Pt and ride verbalized understanding.

## 2017-08-01 NOTE — DISCHARGE INSTRUCTIONS

## 2017-08-01 NOTE — OP NOTE
Stellate ganglion Block Under Ultrasound  Time-out taken to identify patient and procedure side prior to starting the procedure.                     08/01/2017                                              PROCEDURE:   Right  stellate ganglion block under ultrasound    REASON FOR PROCEDURE: Complex regional pain syndrome type 2, affecting unspecified site [G90.50]    PHYSICIAN: Brian Atkins MD  ASSISTANTS: Russell Sosa MD PGY -3     MEDICATIONS INJECTED:  Lidocaine 1% 10 mL    ESTIMATED BLOOD LOSS:  None.    COMPLICATIONS:  None.    TECHNIQUE: Laying in a supine position, the patient was prepped and draped in the usual sterile fashion using ChloraPrep and sterile blue towels.  An ultrasound probe was also sheathed in a  sterile fashion. The level of the cricoid process was palpated and the C6 transverse process was identified under ultrasound, and then tracked down to the C7 transverse process.  The area over the skin was then anesthetized with 1 mL of 1% lidocaine through a 25-gauge needle.  Then a Stimuplex needle  was advanced under ultrasound visualization with care to avoid the vertebral artery and carotid vessels until the anterior border of the  longus coli  muscle was encountered in the area of the sympathetic chain  Once this was confirmed after negative aspiration there was injection of 10 mL of 1% lidocaine in 2 mL increments with repeat aspiration after each injection.  At no point was there any blood aspirated or any paresthesias.  The patient was communicative and able to blink on command throughout the process.    The patient was monitored after the procedure, and sitting upright.  Patient was given post procedure and discharge instructions to follow at home.  We will see the patient back in two weeks or the patient may call to inform of status. The patient was discharged in a stable condition    Conscious sedation provided by M.D    The patient was monitored with continuous pulse oximetry,  EKG, and intermittent blood pressure monitors.  The patient was hemodynamically stable throughout the entire process was responsive to voice, and breathing spontaneously.  Supplemental O2 was provided at 2L/min via nasal cannula.  Patient was comfortable for the duration of the procedure. (See nurse documentation and case log for sedation time)    There was a total of 3mg IV Midazolam was titrated for the procedure

## 2017-08-01 NOTE — DISCHARGE SUMMARY
Discharge Note  Short Stay      SUMMARY     Admit Date: 8/1/2017    Attending Physician: Brian Atkins    Discharge Diagnosis: Complex regional pain syndrome type 1, affecting unspecified site [G90.50]    Discharge Physician: Brian Atkins      Discharge Date: 8/1/2017 12:02 PM     PROCEDURE:   Right  stellate ganglion block under ultrasound    REASON FOR PROCEDURE: Complex regional pain syndrome type 2, affecting unspecified site [G90.50]    Disposition: Home or self care    Patient Instructions:   Current Discharge Medication List      CONTINUE these medications which have NOT CHANGED    Details   b complex vitamins tablet Take 1 tablet by mouth once daily.      diazePAM (VALIUM) 2 MG tablet TAKE 1 TABLET BY MOUTH 3 TIMES A DAY AS NEEDED FOR ANXIETY  Qty: 90 tablet, Refills: 4    Comments: This request is for a new prescription for a controlled substance as required by Federal/State law..      !! duloxetine (CYMBALTA) 30 MG capsule Take 30 mg by mouth once daily.  Refills: 4      !! duloxetine (CYMBALTA) 60 MG capsule Take 1 capsule (60 mg total) by mouth once daily.  Qty: 30 capsule, Refills: 11      folic acid (FOLVITE) 800 MCG Tab Take 800 mcg by mouth once daily.        levetiracetam (KEPPRA) 1000 MG tablet Take 1 tablet (1,000 mg total) by mouth 2 (two) times daily.  Qty: 60 tablet, Refills: 6      lisinopril 10 MG tablet Take 10 mg by mouth once daily.  Refills: 3      mirtazapine (REMERON) 15 MG tablet Take 1 tablet (15 mg total) by mouth every evening.  Qty: 30 tablet, Refills: 11      cyanocobalamin (VITAMIN B-12) 1000 MCG tablet Take 100 mcg by mouth once daily.         !! - Potential duplicate medications found. Please discuss with provider.          Resume home diet and activity
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
## 2017-08-03 ENCOUNTER — TELEPHONE (OUTPATIENT)
Dept: PAIN MEDICINE | Facility: CLINIC | Age: 62
End: 2017-08-03

## 2017-08-03 NOTE — TELEPHONE ENCOUNTER
Called patient in response to her calling about her Cymbalta making her feel sad at which point the patient expressed wanting to kill herself by jumping off of a bridge, kept patient talking on the phone until [place] arrived at patient's home

## 2017-08-17 ENCOUNTER — OFFICE VISIT (OUTPATIENT)
Dept: PSYCHIATRY | Facility: CLINIC | Age: 62
End: 2017-08-17
Payer: MEDICARE

## 2017-08-17 DIAGNOSIS — F33.1 MDD (MAJOR DEPRESSIVE DISORDER), RECURRENT EPISODE, MODERATE: Primary | ICD-10-CM

## 2017-08-17 DIAGNOSIS — F41.9 ANXIETY: ICD-10-CM

## 2017-08-17 PROCEDURE — 99999 PR PBB SHADOW E&M-EST. PATIENT-LVL II: CPT | Mod: PBBFAC,,, | Performed by: SOCIAL WORKER

## 2017-08-17 PROCEDURE — 90791 PSYCH DIAGNOSTIC EVALUATION: CPT | Mod: PBBFAC | Performed by: SOCIAL WORKER

## 2017-08-17 PROCEDURE — 99212 OFFICE O/P EST SF 10 MIN: CPT | Mod: PBBFAC | Performed by: SOCIAL WORKER

## 2017-08-17 PROCEDURE — 90791 PSYCH DIAGNOSTIC EVALUATION: CPT | Mod: S$PBB,,, | Performed by: SOCIAL WORKER
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
## 2017-08-21 ENCOUNTER — TELEPHONE (OUTPATIENT)
Dept: PAIN MEDICINE | Facility: CLINIC | Age: 62
End: 2017-08-21

## 2017-08-21 NOTE — TELEPHONE ENCOUNTER
----- Message from Ras Green sent at 8/21/2017  8:11 AM CDT -----  Contact: Breonna Silva  X_  1st Request  _  2nd Request  _  3rd Request        Who: Breonna Silva    Why: Patient needs to reschedule her appt for tomorrow as she is not feeling well around the same time her appt with Debra Ortega is. Please call back to follow up.    What Number to Call Back: [phone]    When to Expect a call back: (With in 24 hours)

## 2017-08-22 ENCOUNTER — OFFICE VISIT (OUTPATIENT)
Dept: PAIN MEDICINE | Facility: CLINIC | Age: 62
End: 2017-08-22
Payer: MEDICARE

## 2017-08-22 VITALS
SYSTOLIC BLOOD PRESSURE: 129 MMHG | BODY MASS INDEX: 19.83 KG/M2 | WEIGHT: 119.06 LBS | HEART RATE: 96 BPM | TEMPERATURE: 98 F | DIASTOLIC BLOOD PRESSURE: 61 MMHG | RESPIRATION RATE: 18 BRPM | HEIGHT: 65 IN

## 2017-08-22 VITALS
HEART RATE: 96 BPM | TEMPERATURE: 98 F | WEIGHT: 119.06 LBS | BODY MASS INDEX: 19.83 KG/M2 | DIASTOLIC BLOOD PRESSURE: 91 MMHG | SYSTOLIC BLOOD PRESSURE: 129 MMHG | HEIGHT: 65 IN | RESPIRATION RATE: 18 BRPM

## 2017-08-22 DIAGNOSIS — F33.1 MDD (MAJOR DEPRESSIVE DISORDER), RECURRENT EPISODE, MODERATE: Primary | ICD-10-CM

## 2017-08-22 DIAGNOSIS — G56.41 COMPLEX REGIONAL PAIN SYNDROME TYPE 2 OF RIGHT UPPER EXTREMITY: Primary | ICD-10-CM

## 2017-08-22 DIAGNOSIS — G89.4 CHRONIC PAIN DISORDER: ICD-10-CM

## 2017-08-22 PROCEDURE — 3074F SYST BP LT 130 MM HG: CPT | Mod: ,,, | Performed by: PSYCHIATRY & NEUROLOGY

## 2017-08-22 PROCEDURE — 3078F DIAST BP <80 MM HG: CPT | Mod: ,,, | Performed by: PSYCHIATRY & NEUROLOGY

## 2017-08-22 PROCEDURE — 99999 PR PBB SHADOW E&M-EST. PATIENT-LVL III: CPT | Mod: PBBFAC,,, | Performed by: PSYCHIATRY & NEUROLOGY

## 2017-08-22 PROCEDURE — 99213 OFFICE O/P EST LOW 20 MIN: CPT | Mod: S$PBB,,, | Performed by: NURSE PRACTITIONER

## 2017-08-22 PROCEDURE — 3074F SYST BP LT 130 MM HG: CPT | Mod: ,,, | Performed by: NURSE PRACTITIONER

## 2017-08-22 PROCEDURE — 99999 PR PBB SHADOW E&M-EST. PATIENT-LVL III: CPT | Mod: PBBFAC,,, | Performed by: NURSE PRACTITIONER

## 2017-08-22 PROCEDURE — 99213 OFFICE O/P EST LOW 20 MIN: CPT | Mod: S$PBB,,, | Performed by: PSYCHIATRY & NEUROLOGY

## 2017-08-22 PROCEDURE — 3078F DIAST BP <80 MM HG: CPT | Mod: ,,, | Performed by: NURSE PRACTITIONER

## 2017-08-22 PROCEDURE — 99213 OFFICE O/P EST LOW 20 MIN: CPT | Mod: PBBFAC | Performed by: NURSE PRACTITIONER

## 2017-08-22 RX ORDER — ESCITALOPRAM OXALATE 10 MG/1
10 TABLET ORAL DAILY
Qty: 30 TABLET | Refills: 1 | Status: SHIPPED | OUTPATIENT
Start: 2017-08-22 | End: 2017-09-18 | Stop reason: SDUPTHER
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
## 2017-08-22 NOTE — PROGRESS NOTES
Outpatient Psychiatry Follow-Up Visit (MD/NP)    8/22/2017    Clinical Status of Patient:  Outpatient (Ambulatory)    Chief Complaint:  Breonna Silva is a 62 y.o. female who presents today for follow-up of depression and anxiety.  Met with patient.      Interval History and Content of Current Session:  Interim Events/Subjective Report/Content of Current Session: Pt reports that she has not been taking the cymbalta because it is not doing any for her. That she is still not able to do much. She talked about the things that she has been thinking about, her dog, her husbands ashes and that she still has the jaleesa at her house. Talked about her appointment with Dr Cordova and I have encourgaed her to continue to see him. Talked about medications.     Psychotherapy:  · Target symptoms: depression, anxiety   · Why chosen therapy is appropriate versus another modality: patient responds to this modality, evidence based practice  · Outcome monitoring methods: self-report, observation  · Therapeutic intervention type: behavior modifying psychotherapy, supportive psychotherapy  · Topics discussed/themes: relationships difficulties, stress related to medical comorbidities, difficulty managing affect in interpersonal relationships, building skills sets for symptom management, symptom recognition  · The patient's response to the intervention is accepting. The patient's progress toward treatment goals is not progressing.   · Duration of intervention: 30 minutes.    Review of Systems   · PSYCHIATRIC: Pertinant items are noted in the narrative.    Past Medical, Family and Social History: The patient's past medical, family and social history have been reviewed and updated as appropriate within the electronic medical record - see encounter notes.    Compliance: yes    Side effects: tremor    Risk Parameters:  Patient reports no suicidal ideation  Patient reports no homicidal ideation  Patient reports no self-injurious  "behavior  Patient reports no violent behavior    Exam (detailed: at least 9 elements; comprehensive: all 15 elements)   Constitutional  Vitals:  Most recent vital signs, dated less than 90 days prior to this appointment, were reviewed.   Vitals:    08/22/17 1106   BP: (!) 129/91   Pulse: 96   Resp: 18   Temp: 97.9 °F (36.6 °C)   TempSrc: Oral   Weight: 54 kg (119 lb 0.8 oz)   Height: 5' 5" (1.651 m)        General:  age appropriate, casually dressed, neatly groomed     Musculoskeletal  Muscle Strength/Tone:  tremor noted hands   Gait & Station:  non-ataxic     Psychiatric  Speech:  no latency; no press, dysarthia   Mood & Affect:  depressed  congruent and appropriate   Thought Process:  normal and logical, goal-directed   Associations:  intact   Thought Content:  normal, no suicidality, no homicidality, delusions, or paranoia   Insight:  intact   Judgement: behavior is adequate to circumstances   Orientation:  grossly intact   Memory: intact for content of interview   Language: grossly intact   Attention Span & Concentration:  able to focus   Fund of Knowledge:  intact and appropriate to age and level of education     Assessment and Diagnosis   Status/Progress: Based on the examination today, the patient's problem(s) is/are inadequately controlled.  New problems have been presented today.   Co-morbidities are complicating management of the primary condition.  There are no active rule-out diagnoses for this patient at this time.       Impression: Pt is a 61 Y/O woman with multiple medical issues including Seizure disordr, PD, Dementia with      H/O Major depressive disorder.  Unspecified anxiety disorder        Strengths and Liabilities: Strength: Patient accepts guidance/feedback, Strength: Patient is expressive/articulate., Strength: Patient has reasonable judgment.     Treatment Goals:  Specify outcomes written in observable, behavioral terms:   Anxiety: acquiring relapse prevention skills and reducing time spent " worrying (<30 minutes/day)  Depression: acquiring relapse prevention skills, increasing energy, increasing interest in usual activities, increasing motivation, increasing self-reward for positive behaviors (one/day), increasing self-reward for positive thoughts (one/day) and increasing social contacts (three/week)     Treatment Plan/Recommendations:   · Medication Management: Continue current medications. The risks and benefits of medication were discussed with the patient.  · Referral for further treatment to social work team for psychotherapy  · The treatment plan and follow up plan were reviewed with the patient.   ·  Will D/C  cymbalta , she has not been taking it.  · She is also on mirtazepine 15 mg at bedtime,it helps and she will continue this  · That she is complaint with her medications, no side effects.     Will try lexapro 5 mg in the evening.   · Reviewed chart.  · Pt has no current comliant with medications today.  · Will not make any changes today.  · Cont to assess.  · Discussed coping skills.  · Provided supportive psychotherapy.      ·       Return to Clinic: 1 month

## 2017-09-07 ENCOUNTER — TELEPHONE (OUTPATIENT)
Dept: PAIN MEDICINE | Facility: CLINIC | Age: 62
End: 2017-09-07

## 2017-09-07 NOTE — TELEPHONE ENCOUNTER
----- Message from Brian Atkins MD sent at 9/7/2017  4:27 PM CDT -----  Please discontinue the cymbata.      ----- Message -----  From: Eric Morrow MA  Sent: 9/7/2017   2:00 PM  To: Brian Atkins MD     Patient states the Cymbalta that Dr. Atkins gave her is not helping with pain and it makes her feel like jumping off a bridge. Please advise.    ----- Message -----  From: Mirtha Wilkins MD  Sent: 9/7/2017   1:03 PM  To: Eric Morrow MA    Attempted to call pt but she was not there.   ----- Message -----  From: Eric Morrow MA  Sent: 9/7/2017  11:50 AM  To: Mirtha Wilkins MD    Patient called today about her not able to get a therapy group with Dennys Cordova. We called and spoke with Sachi about the appt and pt schedule for September 15, 2017 for therapy group sessions. Patient states the Cymbalta tat Dr. Atkins gave is not helping with pain and it makes her feel like jumping off a bridge.   Also, the Lexapro works as far as sleeping but she thinks she sleeps to much an then it seams like she's sleep walking. Please call back or tell me what to do?  Please advise.

## 2017-09-13 ENCOUNTER — TELEPHONE (OUTPATIENT)
Dept: PAIN MEDICINE | Facility: CLINIC | Age: 62
End: 2017-09-13

## 2017-09-13 NOTE — TELEPHONE ENCOUNTER
----- Message from Libra Culp LPN sent at 9/13/2017 12:19 PM CDT -----  Contact: Patient herself  Patient calling to schedule injection    Note below from LOV with LUCIO Richardson 08/22/2017    - Schedule for C7-T1 IL SAIDA. The procedure, risks, benefits and options were discussed with patient. There are no contraindications to the procedure. The patient expressed understanding and agreed to proceed.  Consent obtained today.    ----- Message -----  From: aMry Ellen Benton  Sent: 9/13/2017   8:19 AM  To: Wilbert Torres Staff    X 1st Request  _  2nd Request  _  3rd Request    Who: Breonna Silva (mrn# [id number])    Why: Patient called requesting to schedule her procedure.  Please give a call back at your earliest convenience.        THANKS!    What Number to Call Back: [phone]    When to Expect a call back: (With in 24 hours)

## 2017-09-13 NOTE — TELEPHONE ENCOUNTER
Left voice message asking for a return call to schedule patient for a C7-T1 IL Carlos with Dr. Atkins.

## 2017-09-15 ENCOUNTER — TELEPHONE (OUTPATIENT)
Dept: PAIN MEDICINE | Facility: CLINIC | Age: 62
End: 2017-09-15

## 2017-09-15 NOTE — TELEPHONE ENCOUNTER
----- Message from Laverne Morrow MA sent at 9/14/2017  5:58 PM CDT -----  Message   Received: Today   Message Contents   MD Laverne Troy MA         Ok, ask her to discontinue it Laverne   Previous Messages        ----- Message -----   From: Laverne Morrow MA   Sent: 9/13/2017   4:32 PM   To: Mirtha Wilkins MD     Called and spoke with her about her Lexapro she sleep walking and she lose a day. Pt states she loose a whole day and wake up the next day. Pt states she falling asleep she states her  notice her sleeping down in the chair. So she's schedule to see you on Monday 18, 2017 and Breonna Orr at 1:pm Monday.   ----- Message -----   From: Violeta Lim   Sent: 9/13/2017   4:01 PM   To: Claudette Shannon Staff     x 1st Request   _ 2nd Request   _ 3rd Request     Who: pt     Why: Pt is calling to speak to nurse in regards to medication, Lexpro. Please call and advise.       What Number to Call Back: [phone]       When to Expect a call back: (Before the end of the day)   -- if call after 3:00 call back will be tomorrow.

## 2017-09-18 ENCOUNTER — OFFICE VISIT (OUTPATIENT)
Dept: PAIN MEDICINE | Facility: CLINIC | Age: 62
End: 2017-09-18
Payer: MEDICARE

## 2017-09-18 ENCOUNTER — TELEPHONE (OUTPATIENT)
Dept: NEUROLOGY | Facility: CLINIC | Age: 62
End: 2017-09-18

## 2017-09-18 VITALS
HEART RATE: 87 BPM | BODY MASS INDEX: 19.83 KG/M2 | HEIGHT: 65 IN | WEIGHT: 119 LBS | SYSTOLIC BLOOD PRESSURE: 137 MMHG | DIASTOLIC BLOOD PRESSURE: 87 MMHG | TEMPERATURE: 98 F

## 2017-09-18 VITALS
SYSTOLIC BLOOD PRESSURE: 137 MMHG | RESPIRATION RATE: 18 BRPM | DIASTOLIC BLOOD PRESSURE: 87 MMHG | TEMPERATURE: 98 F | HEIGHT: 65 IN | WEIGHT: 119.06 LBS | HEART RATE: 87 BPM | BODY MASS INDEX: 19.83 KG/M2

## 2017-09-18 DIAGNOSIS — F43.10 PTSD (POST-TRAUMATIC STRESS DISORDER): Primary | ICD-10-CM

## 2017-09-18 DIAGNOSIS — G56.41 COMPLEX REGIONAL PAIN SYNDROME TYPE 2 OF RIGHT UPPER EXTREMITY: Primary | ICD-10-CM

## 2017-09-18 DIAGNOSIS — M54.12 CERVICAL RADICULOPATHY: ICD-10-CM

## 2017-09-18 PROCEDURE — 3075F SYST BP GE 130 - 139MM HG: CPT | Mod: ,,, | Performed by: PSYCHIATRY & NEUROLOGY

## 2017-09-18 PROCEDURE — 99213 OFFICE O/P EST LOW 20 MIN: CPT | Mod: S$PBB,,, | Performed by: PSYCHIATRY & NEUROLOGY

## 2017-09-18 PROCEDURE — 3079F DIAST BP 80-89 MM HG: CPT | Mod: ,,, | Performed by: PSYCHIATRY & NEUROLOGY

## 2017-09-18 PROCEDURE — 3075F SYST BP GE 130 - 139MM HG: CPT | Mod: ,,, | Performed by: NURSE PRACTITIONER

## 2017-09-18 PROCEDURE — 99213 OFFICE O/P EST LOW 20 MIN: CPT | Mod: PBBFAC,27 | Performed by: PSYCHIATRY & NEUROLOGY

## 2017-09-18 PROCEDURE — 99999 PR PBB SHADOW E&M-EST. PATIENT-LVL III: CPT | Mod: PBBFAC,,, | Performed by: PSYCHIATRY & NEUROLOGY

## 2017-09-18 PROCEDURE — 99213 OFFICE O/P EST LOW 20 MIN: CPT | Mod: S$PBB,,, | Performed by: NURSE PRACTITIONER

## 2017-09-18 PROCEDURE — 3079F DIAST BP 80-89 MM HG: CPT | Mod: ,,, | Performed by: NURSE PRACTITIONER

## 2017-09-18 PROCEDURE — 99999 PR PBB SHADOW E&M-EST. PATIENT-LVL III: CPT | Mod: PBBFAC,,, | Performed by: NURSE PRACTITIONER

## 2017-09-18 PROCEDURE — 99213 OFFICE O/P EST LOW 20 MIN: CPT | Mod: PBBFAC | Performed by: NURSE PRACTITIONER

## 2017-09-18 NOTE — PROGRESS NOTES
"Outpatient Psychiatry Follow-Up Visit (MD/NP)    9/18/2017    Clinical Status of Patient:  Outpatient (Ambulatory)    Chief Complaint:  Breonna Silva is a 62 y.o. female who presents today for follow-up of depression and anxiety.  Met with patient.      Interval History and Content of Current Session:  Interim Events/Subjective Report/Content of Current Session: Pt reports that she had " terrible side effects with the lexapro", that she was sleeping walking , having "hallucinations", that she felt weak and giddiness. That she stopped taking it after about a week. That she is apprehensive about medications. That she is still trying to do things to keep herslef busy though she does have days when she gets very anxious. That she is still thinking about her  and the thngs that he did. Discussed medications, pt reports that she saw the advertisement for rexulti and would like to try that. I did discuss with her concerns that I have with an atypical antipsychotic. Discussed trentillex and will try this first.     Psychotherapy:  · Target symptoms: depression, anxiety   · Why chosen therapy is appropriate versus another modality: patient responds to this modality, evidence based practice  · Outcome monitoring methods: self-report, observation  · Therapeutic intervention type: behavior modifying psychotherapy, supportive psychotherapy  · Topics discussed/themes: relationships difficulties, stress related to medical comorbidities, difficulty managing affect in interpersonal relationships, building skills sets for symptom management, symptom recognition  · The patient's response to the intervention is accepting. The patient's progress toward treatment goals is not progressing.   · Duration of intervention: 30 minutes.    Review of Systems   · PSYCHIATRIC: Pertinant items are noted in the narrative.    Past Medical, Family and Social History: The patient's past medical, family and social history have been reviewed " "and updated as appropriate within the electronic medical record - see encounter notes.    Compliance: yes    Side effects: tremor    Risk Parameters:  Patient reports no suicidal ideation  Patient reports no homicidal ideation  Patient reports no self-injurious behavior  Patient reports no violent behavior    Exam (detailed: at least 9 elements; comprehensive: all 15 elements)   Constitutional  Vitals:  Most recent vital signs, dated less than 90 days prior to this appointment, were reviewed.   Vitals:    09/18/17 1129   BP: 137/87   Pulse: 87   Resp: 18   Temp: 97.6 °F (36.4 °C)   TempSrc: Oral   Weight: 54 kg (119 lb 0.8 oz)   Height: 5' 5" (1.651 m)        General:  age appropriate, casually dressed, neatly groomed     Musculoskeletal  Muscle Strength/Tone:  tremor noted hands   Gait & Station:  non-ataxic     Psychiatric  Speech:  no latency; no press, dysarthia   Mood & Affect:  depressed  congruent and appropriate   Thought Process:  normal and logical, goal-directed   Associations:  intact   Thought Content:  normal, no suicidality, no homicidality, delusions, or paranoia   Insight:  intact   Judgement: behavior is adequate to circumstances   Orientation:  grossly intact   Memory: intact for content of interview   Language: grossly intact   Attention Span & Concentration:  able to focus   Fund of Knowledge:  intact and appropriate to age and level of education     Assessment and Diagnosis   Status/Progress: Based on the examination today, the patient's problem(s) is/are inadequately controlled.  New problems have been presented today.   Co-morbidities are complicating management of the primary condition.  There are no active rule-out diagnoses for this patient at this time.       Impression: Pt is a 63 Y/O woman with multiple medical issues including Seizure disordr, PD, Dementia with      H/O Major depressive disorder.  Unspecified anxiety disorder        Strengths and Liabilities: Strength: Patient accepts " guidance/feedback, Strength: Patient is expressive/articulate., Strength: Patient has reasonable judgment.     Treatment Goals:  Specify outcomes written in observable, behavioral terms:   Anxiety: acquiring relapse prevention skills and reducing time spent worrying (<30 minutes/day)  Depression: acquiring relapse prevention skills, increasing energy, increasing interest in usual activities, increasing motivation, increasing self-reward for positive behaviors (one/day), increasing self-reward for positive thoughts (one/day) and increasing social contacts (three/week)     Treatment Plan/Recommendations:   · Medication Management: Continue current medications. The risks and benefits of medication were discussed with the patient.  · Referral for further treatment to social work team for psychotherapy  · The treatment plan and follow up plan were reviewed with the patient.   · She is also on mirtazepine 15 mg at bedtime,it helps and she will continue this  · That she is complaint with her medications, no side effects.     Will D/C  lexapro    · Reviewed chart.  ·   · Will try trentillex.5 mg daily.   · Cont to assess.  · Discussed coping skills.  · Provided supportive psychotherapy.      ·       Return to Clinic: 1 month

## 2017-09-18 NOTE — TELEPHONE ENCOUNTER
----- Message from Michelet Chaudhary sent at 9/18/2017  1:54 PM CDT -----  Contact: Sharla benjamin/ [facility]--Dr. Mirtha Holt Ext [phone]  Sharla would like to schedule the pt to see someone for Parkinson's. Sharla is asking someone contact the pt directly to schedule appt. Pls call.
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
## 2017-09-19 ENCOUNTER — TELEPHONE (OUTPATIENT)
Dept: PAIN MEDICINE | Facility: CLINIC | Age: 62
End: 2017-09-19

## 2017-09-19 NOTE — TELEPHONE ENCOUNTER
Attempted to call Ms Breonnadino Silva letting her know per Dr. Wilkins reschedule her appointment with Dennys Cordova for tomorrow 09/20/2017 at 6:30 pm. Please call my GIOVANI Reyes, to confirm this schedule appt. [phone].

## 2017-09-19 NOTE — TELEPHONE ENCOUNTER
----- Message from Cristobal Kinsey sent at 9/19/2017 10:42 AM CDT -----  X_  1st Request  _  2nd Request  _  3rd Request        Who: CHANTEL GOMEZ [[id number]]    Why: Pt would like to speak with Dr. Wilkins about Dr. Cordova. She's having a hard time reaching him. She would like to know if Dr. Wilkins can suggest another therapist. Please call to discuss.    What Number to Call Back:[phone]    When to Expect a call back: (With in 24 hours)

## 2017-09-20 ENCOUNTER — TELEPHONE (OUTPATIENT)
Dept: PAIN MEDICINE | Facility: CLINIC | Age: 62
End: 2017-09-20

## 2017-09-20 NOTE — TELEPHONE ENCOUNTER
Left voice message asking for a return call to schedule patient on 10-11-17 with Dr. Atkins for a C7-T1 ILESI

## 2017-09-21 ENCOUNTER — TELEPHONE (OUTPATIENT)
Dept: PAIN MEDICINE | Facility: OTHER | Age: 62
End: 2017-09-21

## 2017-09-21 NOTE — TELEPHONE ENCOUNTER
----- Message from Aye Smith sent at 9/20/2017  3:09 PM CDT -----  Darya thomas on pt.'s v/m asking pt. To call to reschedule procedure.   ----- Message -----  From: Latasha Malave LPN  Sent: 9/20/2017   1:09 PM  To: [facility] Pain Management Schedulers

## 2017-09-22 ENCOUNTER — TELEPHONE (OUTPATIENT)
Dept: PAIN MEDICINE | Facility: CLINIC | Age: 62
End: 2017-09-22

## 2017-09-22 NOTE — TELEPHONE ENCOUNTER
Left another voice message asking for a return cll regarding her procedure on 10-11-17 with Dr. Atkins.

## 2017-09-27 ENCOUNTER — TELEPHONE (OUTPATIENT)
Dept: PAIN MEDICINE | Facility: CLINIC | Age: 62
End: 2017-09-27

## 2017-09-27 NOTE — TELEPHONE ENCOUNTER
----- Message from Kaye Chino sent at 9/27/2017 10:14 AM CDT -----  _  1st Request  _  2nd Request  _  3rd Request        Who: patient    Why: Requesting a call back in regards to she is having issues, she is getting confused and ends up in another room and doesn't know how she got there. Please call,  pt call yesterday but she doesn't remember that she did.     Please return the call at earliest convenience. Thanks!    What Number to Call Back:[phone]    When to Expect a call back: (Within 24 hours)

## 2017-10-04 ENCOUNTER — TELEPHONE (OUTPATIENT)
Dept: PAIN MEDICINE | Facility: CLINIC | Age: 62
End: 2017-10-04

## 2017-10-04 NOTE — TELEPHONE ENCOUNTER
Called and spoke with pt today about  [facility] pharmacy charging her co-pay of $346.92 for vortioxetine (TRINTELLIX) 5 mg Tab. We called [employer] insurance for Prior Authorization and spoke with [employer] they are sending you a Tier exceptional form to fill out for her. Pt states she can go get gas and some carrolsein from [employer] then go sit in a corner and pour gas all over her, because nothing works for her. Pt was advise not to think negative or bring any harm to one self. Pt states she will wait for a call from you tomorrow.

## 2017-10-04 NOTE — TELEPHONE ENCOUNTER
----- Message from Eric Morrow MA sent at 10/4/2017  2:37 PM CDT -----  [facility] Staff  Caller: pt (Today, 11:20 AM)         _  1st Request   _  2nd Request   x  3rd Request         Who: pt     Why: Requesting a call back in regards to speak to the nurse or doctor regarding her medications. She is very upset because no one calls her back. She says she called many times and no one calls her back.     mirtazapine (REMERON) 15 MG tablet - she is saying this medication costs 400.00. Please prescribe something else. She has no medication. She says she is losing it. Her  just . She lost her house. Please call the pt please.       Please call the pt     What Number to Call Back:[phone]     When to Expect a call back: (Within 24 hours)     Please return the call at earliest convenience. Thanks!

## 2017-10-04 NOTE — TELEPHONE ENCOUNTER
----- Message from Inge Giron sent at 10/4/2017 11:20 AM CDT -----  Contact: pt  _  1st Request  _  2nd Request  x  3rd Request        Who: pt    Why: Requesting a call back in regards to speak to the nurse or doctor regarding her medications. She is very upset because no one calls her back. She says she called many times and no one calls her back.    mirtazapine (REMERON) 15 MG tablet - she is saying this medication costs 400.00. Please prescribe something else. She has no medication. She says she is losing it. Her  just . She lost her house. Please call the pt please.       Please call the pt     What Number to Call Back:[phone]    When to Expect a call back: (Within 24 hours)    Please return the call at earliest convenience. Thanks!

## 2017-10-04 NOTE — TELEPHONE ENCOUNTER
Attempted to call pt today about her medication, pt requesting would like something else so the cost want be so high. Please advise.

## 2017-10-06 ENCOUNTER — TELEPHONE (OUTPATIENT)
Dept: PAIN MEDICINE | Facility: CLINIC | Age: 62
End: 2017-10-06

## 2017-10-06 NOTE — TELEPHONE ENCOUNTER
Called and spoke with Mrs. Silva about getting a response from [employer] about her PA. Letting her knoe per Dr. Wilkins is waiting on their decision to approve Trintellix. Pt states she will wait, thanks.

## 2017-10-09 ENCOUNTER — TELEPHONE (OUTPATIENT)
Dept: PAIN MEDICINE | Facility: CLINIC | Age: 62
End: 2017-10-09

## 2017-10-09 NOTE — TELEPHONE ENCOUNTER
Attempted to call pt today about her medication and left her a message to call Dr. Wilkins on tomorrow to see how she's doing.

## 2017-10-16 ENCOUNTER — OFFICE VISIT (OUTPATIENT)
Dept: PAIN MEDICINE | Facility: CLINIC | Age: 62
End: 2017-10-16
Payer: MEDICARE

## 2017-10-16 ENCOUNTER — TELEPHONE (OUTPATIENT)
Dept: PAIN MEDICINE | Facility: CLINIC | Age: 62
End: 2017-10-16

## 2017-10-16 VITALS
SYSTOLIC BLOOD PRESSURE: 182 MMHG | HEART RATE: 87 BPM | RESPIRATION RATE: 20 BRPM | TEMPERATURE: 99 F | DIASTOLIC BLOOD PRESSURE: 91 MMHG | HEIGHT: 65 IN

## 2017-10-16 DIAGNOSIS — F33.1 MDD (MAJOR DEPRESSIVE DISORDER), RECURRENT EPISODE, MODERATE: Primary | ICD-10-CM

## 2017-10-16 PROCEDURE — 99213 OFFICE O/P EST LOW 20 MIN: CPT | Mod: S$GLB,,, | Performed by: PSYCHIATRY & NEUROLOGY

## 2017-10-16 PROCEDURE — 99213 OFFICE O/P EST LOW 20 MIN: CPT | Mod: PBBFAC | Performed by: PSYCHIATRY & NEUROLOGY

## 2017-10-16 PROCEDURE — 99999 PR PBB SHADOW E&M-EST. PATIENT-LVL III: CPT | Mod: PBBFAC,,, | Performed by: PSYCHIATRY & NEUROLOGY

## 2017-10-16 NOTE — PROGRESS NOTES
Outpatient Psychiatry Follow-Up Visit (MD/NP)    10/16/2017    Clinical Status of Patient:  Outpatient (Ambulatory)    Chief Complaint:  Breonna Silva is a 62 y.o. female who presents today for follow-up of depression and anxiety.  Met with patient.      Interval History and Content of Current Session:  Interim Events/Subjective Report/Content of Current Session: Pt reports that she has not had the trintellix filled yet because the pharmacy told her that it was not authorized yet. That she continues to have episodes of depression when she feels a sense of hopelessness/helplessness/worthlessness. That she is still not sure about what her role in life is at this time. We talked about this in detail, she denies having any SI/HI/intent/plans. That she really enjoyed her visit with Dr Cordova but was not able to get a F/U appointment so has been frustrated about that. Talked about CBT and coping skills. Also gave her the information for the [facility] program.     Psychotherapy:  · Target symptoms: depression, anxiety   · Why chosen therapy is appropriate versus another modality: patient responds to this modality, evidence based practice  · Outcome monitoring methods: self-report, observation  · Therapeutic intervention type: behavior modifying psychotherapy, supportive psychotherapy  · Topics discussed/themes: relationships difficulties, stress related to medical comorbidities, difficulty managing affect in interpersonal relationships, building skills sets for symptom management, symptom recognition  · The patient's response to the intervention is accepting. The patient's progress toward treatment goals is not progressing.   · Duration of intervention: 30 minutes.    Review of Systems   · PSYCHIATRIC: Pertinant items are noted in the narrative.    Past Medical, Family and Social History: The patient's past medical, family and social history have been reviewed and updated as appropriate within the electronic medical  "record - see encounter notes.    Compliance: yes    Side effects: tremor    Risk Parameters:  Patient reports no suicidal ideation  Patient reports no homicidal ideation  Patient reports no self-injurious behavior  Patient reports no violent behavior    Exam (detailed: at least 9 elements; comprehensive: all 15 elements)   Constitutional  Vitals:  Most recent vital signs, dated less than 90 days prior to this appointment, were reviewed.   Vitals:    10/16/17 1155   BP: (!) 182/91   Pulse: 87   Resp: 20   Temp: 98.7 °F (37.1 °C)   TempSrc: Oral   Height: 5' 5" (1.651 m)        General:  age appropriate, casually dressed, neatly groomed     Musculoskeletal  Muscle Strength/Tone:  tremor noted hands   Gait & Station:  non-ataxic     Psychiatric  Speech:  no latency; no press, dysarthia   Mood & Affect:  depressed  congruent and appropriate   Thought Process:  normal and logical, goal-directed   Associations:  intact   Thought Content:  normal, no suicidality, no homicidality, delusions, or paranoia   Insight:  intact   Judgement: behavior is adequate to circumstances   Orientation:  grossly intact   Memory: intact for content of interview   Language: grossly intact   Attention Span & Concentration:  able to focus   Fund of Knowledge:  intact and appropriate to age and level of education     Assessment and Diagnosis   Status/Progress: Based on the examination today, the patient's problem(s) is/are inadequately controlled.  New problems have been presented today.   Co-morbidities are complicating management of the primary condition.  There are no active rule-out diagnoses for this patient at this time.       Impression: Pt is a 63 Y/O woman with multiple medical issues including Seizure disordr, PD, Dementia with      H/O Major depressive disorder.  Unspecified anxiety disorder        Strengths and Liabilities: Strength: Patient accepts guidance/feedback, Strength: Patient is expressive/articulate., Strength: Patient has " reasonable judgment.     Treatment Goals:  Specify outcomes written in observable, behavioral terms:   Anxiety: acquiring relapse prevention skills and reducing time spent worrying (<30 minutes/day)  Depression: acquiring relapse prevention skills, increasing energy, increasing interest in usual activities, increasing motivation, increasing self-reward for positive behaviors (one/day), increasing self-reward for positive thoughts (one/day) and increasing social contacts (three/week)     Treatment Plan/Recommendations:   · Medication Management: Continue current medications. The risks and benefits of medication were discussed with the patient.  · Referral for further treatment to social work team for psychotherapy  · The treatment plan and follow up plan were reviewed with the patient.   · She is also on mirtazepine 15 mg at bedtime,it helps and she will continue this  · That she is complaint with her medications, no side effects.     Will D/C  lexapro    · Reviewed chart.  ·   · Will start trentillex.5 mg daily, it has been apprpoved and verified with Pharmacy.    · Cont to assess.  · Discussed coping skills.  · Provided supportive psychotherapy.          Return to Clinic: 1 months

## 2017-10-19 RX ORDER — ESCITALOPRAM OXALATE 10 MG/1
10 TABLET ORAL DAILY
Qty: 30 TABLET | Refills: 1 | OUTPATIENT
Start: 2017-10-19 | End: 2017-11-18

## 2017-11-06 ENCOUNTER — TELEPHONE (OUTPATIENT)
Dept: PAIN MEDICINE | Facility: CLINIC | Age: 62
End: 2017-11-06

## 2017-11-06 NOTE — TELEPHONE ENCOUNTER
Attempted to call pt today about medication making her dizzy. Left pt a message to call Dr. Wilkins office tomorrow asas at [phone].

## 2017-11-06 NOTE — TELEPHONE ENCOUNTER
----- Message from Inge Giron sent at 11/3/2017  9:31 AM CDT -----  Contact: pt  _  1st Request  _  2nd Request  _  3rd Request        Who: pt    Why: Requesting a call back in regards to please call the regarding medications that are making her dizzy. Please call pt     What Number to Call Back:[phone]    When to Expect a call back: (Within 24 hours)    Please return the call at earliest convenience. Thanks!

## 2017-11-21 ENCOUNTER — TELEPHONE (OUTPATIENT)
Dept: PAIN MEDICINE | Facility: CLINIC | Age: 62
End: 2017-11-21

## 2017-11-21 RX ORDER — ESCITALOPRAM OXALATE 10 MG/1
10 TABLET ORAL DAILY
Qty: 30 TABLET | Refills: 1 | OUTPATIENT
Start: 2017-11-21 | End: 2017-12-21

## 2017-11-21 NOTE — TELEPHONE ENCOUNTER
Attempted to call pt a few times today and left her messages to call Dr. Claudette Reyes, to schedule follow up appt with Dr. Wilkins.

## 2017-11-24 ENCOUNTER — TELEPHONE (OUTPATIENT)
Dept: PAIN MEDICINE | Facility: CLINIC | Age: 62
End: 2017-11-24

## 2017-11-24 NOTE — TELEPHONE ENCOUNTER
Contacted and spoke to patient, informed her that we have received her message. Dr. Wilkins is out of the office today, so she will receive a call from Ms. Reyes on Monday to discuss her message further.     Patient verbalized understanding.

## 2017-11-24 NOTE — TELEPHONE ENCOUNTER
----- Message from Erika Fernandez sent at 11/24/2017 10:12 AM CST -----  Contact: pt  x_  1st Request  _  2nd Request  _  3rd Request    Who: CHANTEL GOMEZ [[id number]]    Why: Patient would like to speak with staff in regards to scheduling an appointment. Patient also has medication suggestions that she would like to discuss.     What Number to Call Back:[phone]    When to Expect a call back: (Within 24 hours)    Please return the call at earliest convenience. Thanks!

## 2017-11-27 RX ORDER — ESCITALOPRAM OXALATE 10 MG/1
10 TABLET ORAL DAILY
Qty: 30 TABLET | Refills: 1 | OUTPATIENT
Start: 2017-11-27 | End: 2017-12-27

## 2017-11-28 ENCOUNTER — TELEPHONE (OUTPATIENT)
Dept: PAIN MEDICINE | Facility: CLINIC | Age: 62
End: 2017-11-28

## 2017-11-28 NOTE — TELEPHONE ENCOUNTER
Attempted to call pt today about rescheduling her appointment with Dr. Wilkins and no answer today.

## 2017-11-28 NOTE — TELEPHONE ENCOUNTER
----- Message from Erika Fernandez sent at 11/28/2017 11:23 AM CST -----  Contact: pt  x_  1st Request  _  2nd Request  _  3rd Request    Who: CHANTEL GOMEZ [[id number]]    Why: Patient would like to speak with staff in regards to scheduling an appointment. Patient states she is not able to come today because she has pain in her legs and she is very sad. Cancelled today's appt. Please call.     What Number to Call Back:[phone]    When to Expect a call back: (Within 24 hours)    Please return the call at earliest convenience. Thanks!

## 2017-11-29 ENCOUNTER — TELEPHONE (OUTPATIENT)
Dept: PAIN MEDICINE | Facility: CLINIC | Age: 62
End: 2017-11-29

## 2017-11-29 ENCOUNTER — OFFICE VISIT (OUTPATIENT)
Dept: PAIN MEDICINE | Facility: CLINIC | Age: 62
End: 2017-11-29
Payer: MEDICARE

## 2017-11-29 VITALS
HEART RATE: 78 BPM | RESPIRATION RATE: 16 BRPM | WEIGHT: 119 LBS | BODY MASS INDEX: 19.83 KG/M2 | DIASTOLIC BLOOD PRESSURE: 90 MMHG | SYSTOLIC BLOOD PRESSURE: 159 MMHG | TEMPERATURE: 98 F | HEIGHT: 65 IN

## 2017-11-29 DIAGNOSIS — G56.41 COMPLEX REGIONAL PAIN SYNDROME TYPE 2 OF RIGHT UPPER EXTREMITY: Primary | ICD-10-CM

## 2017-11-29 DIAGNOSIS — M54.12 CERVICAL RADICULOPATHY: ICD-10-CM

## 2017-11-29 DIAGNOSIS — G89.4 CHRONIC PAIN DISORDER: ICD-10-CM

## 2017-11-29 PROCEDURE — 99999 PR PBB SHADOW E&M-EST. PATIENT-LVL III: CPT | Mod: PBBFAC,,, | Performed by: NURSE PRACTITIONER

## 2017-11-29 PROCEDURE — 99213 OFFICE O/P EST LOW 20 MIN: CPT | Mod: S$GLB,,, | Performed by: NURSE PRACTITIONER

## 2017-11-29 PROCEDURE — 99213 OFFICE O/P EST LOW 20 MIN: CPT | Mod: PBBFAC | Performed by: NURSE PRACTITIONER
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
## 2017-11-30 ENCOUNTER — TELEPHONE (OUTPATIENT)
Dept: PAIN MEDICINE | Facility: CLINIC | Age: 62
End: 2017-11-30

## 2017-11-30 RX ORDER — DIAZEPAM 2 MG/1
TABLET ORAL
Qty: 90 TABLET | Refills: 5 | Status: SHIPPED | OUTPATIENT
Start: 2017-11-30 | End: 2018-07-02 | Stop reason: SDUPTHER

## 2017-11-30 NOTE — TELEPHONE ENCOUNTER
Attempted to call pt today about her call back and to see how she's doing per Dr. Wilkins. Left her a message to let the call center know to contact Dr. Wilkins at ext: [phone].

## 2017-11-30 NOTE — TELEPHONE ENCOUNTER
----- Message from Erika Fernandez sent at 11/30/2017  3:08 PM CST -----  Contact: pt  x_  1st Request  _  2nd Request  _  3rd Request    Who: CHANTEL GOMEZ [[id number]]    Why: Patient would like to speak with staff in regards to scheduling an appt.    What Number to Call Back:[phone]    When to Expect a call back: (Within 24 hours)    Please return the call at earliest convenience. Thanks!

## 2017-11-30 NOTE — TELEPHONE ENCOUNTER
----- Message from Cristobal Kinsey sent at 11/30/2017  4:03 PM CST -----  _  1st Request  X_  2nd Request  _  3rd Request        Who: CHANTEL GOMEZ [[id number]]    Why: Requesting a call back in regards to scheduling an appt with Dr. Wilkins regarding some medications that aren't working for her. Please call to set up an appt as soon as possible.    What Number to Call Back:[phone]    When to Expect a call back: (Within 24 hours)    Please return the call at earliest convenience. Thanks!

## 2017-11-30 NOTE — TELEPHONE ENCOUNTER
Called and left message for pt letting her know when she call back for Dr. nolen the call center will call me for her so we can discuss her follow up appt

## 2017-11-30 NOTE — TELEPHONE ENCOUNTER
----- Message from Connie Martinez sent at 11/30/2017 10:53 AM CST -----  _x  1st Request  _  2nd Request  _  3rd Request        Who: mahesh    Why: pt. Would like to speak with  or nurse please call to discuss. Please call to discuss    What Number to Call Back:[phone]    When to Expect a call back: (Before the end of the day)   -- if the call is after 12:00, the call back will be tomorrow.

## 2017-12-02 RX ORDER — LISINOPRIL 10 MG/1
TABLET ORAL
Qty: 90 TABLET | Refills: 3 | Status: SHIPPED | OUTPATIENT
Start: 2017-12-02 | End: 2018-03-13 | Stop reason: SDUPTHER

## 2017-12-04 ENCOUNTER — TELEPHONE (OUTPATIENT)
Dept: PAIN MEDICINE | Facility: CLINIC | Age: 62
End: 2017-12-04

## 2017-12-04 NOTE — TELEPHONE ENCOUNTER
Patient called back today about her rescheduling her appointment. Also, letting Dr. Wilkins know on last week she she left because she was nausous and just wanted to go home and lay down. please excuse her for leaving.

## 2017-12-04 NOTE — TELEPHONE ENCOUNTER
----- Message from Connie Martinez sent at 12/4/2017  8:27 AM CST -----  _x  1st Request  _  2nd Request  _  3rd Request        Who: mahesh    Why: pt. Would like to speak with grover regarding medication. Please call to discuss    What Number to Call Back:[phone]    When to Expect a call back: (Before the end of the day)   -- if the call is after 12:00, the call back will be tomorrow.

## 2017-12-05 ENCOUNTER — OFFICE VISIT (OUTPATIENT)
Dept: PAIN MEDICINE | Facility: CLINIC | Age: 62
End: 2017-12-05
Payer: MEDICARE

## 2017-12-05 VITALS
RESPIRATION RATE: 16 BRPM | DIASTOLIC BLOOD PRESSURE: 83 MMHG | HEIGHT: 65 IN | BODY MASS INDEX: 19.83 KG/M2 | WEIGHT: 119.06 LBS | HEART RATE: 100 BPM | TEMPERATURE: 98 F | SYSTOLIC BLOOD PRESSURE: 135 MMHG

## 2017-12-05 DIAGNOSIS — F43.10 PTSD (POST-TRAUMATIC STRESS DISORDER): Primary | ICD-10-CM

## 2017-12-05 PROCEDURE — 99999 PR PBB SHADOW E&M-EST. PATIENT-LVL III: CPT | Mod: PBBFAC,,, | Performed by: PSYCHIATRY & NEUROLOGY

## 2017-12-05 PROCEDURE — 99213 OFFICE O/P EST LOW 20 MIN: CPT | Mod: S$GLB,,, | Performed by: PSYCHIATRY & NEUROLOGY

## 2017-12-05 RX ORDER — LISINOPRIL 10 MG/1
TABLET ORAL
Qty: 90 TABLET | Refills: 3 | Status: SHIPPED | OUTPATIENT
Start: 2017-12-05 | End: 2018-03-13 | Stop reason: SDUPTHER

## 2017-12-05 RX ORDER — DULOXETIN HYDROCHLORIDE 30 MG/1
30 CAPSULE, DELAYED RELEASE ORAL DAILY
Qty: 60 CAPSULE | Refills: 2 | Status: SHIPPED | OUTPATIENT
Start: 2017-12-05 | End: 2018-03-13

## 2017-12-05 NOTE — PROGRESS NOTES
Outpatient Psychiatry Follow-Up Visit (MD/NP)    12/5/2017    Clinical Status of Patient:  Outpatient (Ambulatory)    Chief Complaint:  Breonna Silva is a 62 y.o. female who presents today for follow-up of depression and anxiety.  Met with patient.      Interval History and Content of Current Session:  Interim Events/Subjective Report/Content of Current Session: Pt reports was very upset at her last appointment with Debra and that she was reacting to that when she talked about hurting herself . That she has been having on going issues with the pain and gets frustrated. That she continues to have episodes of depression when she feels a sense of hopelessness/helplessness/worthlessness. She is missing her dog a lot. That she does not like the place she is living in and is making plans to move somewhere else. Discussed the antidepressants. Pt reports that she is now on cymbalta , started taking that again. That she does not like any of the other ones because she had side effects from them ie lexapro, vortioxetine. I have discussed with her that if she is compliant with her medications, a better assessment can be made but she needs to take them daily. That that will help her with the depression.   Talked about CBT and coping skills. Also gave her the information for the [facility] again.    Psychotherapy:  · Target symptoms: depression, anxiety   · Why chosen therapy is appropriate versus another modality: patient responds to this modality, evidence based practice  · Outcome monitoring methods: self-report, observation  · Therapeutic intervention type: behavior modifying psychotherapy, supportive psychotherapy  · Topics discussed/themes: relationships difficulties, stress related to medical comorbidities, difficulty managing affect in interpersonal relationships, building skills sets for symptom management, symptom recognition  · The patient's response to the intervention is accepting. The patient's progress toward  treatment goals is not progressing.   · Duration of intervention: 30 minutes.    Review of Systems   · PSYCHIATRIC: Pertinant items are noted in the narrative.    Past Medical, Family and Social History: The patient's past medical, family and social history have been reviewed and updated as appropriate within the electronic medical record - see encounter notes.    Compliance: yes    Side effects: tremor    Risk Parameters:  Patient reports no suicidal ideation  Patient reports no homicidal ideation  Patient reports no self-injurious behavior  Patient reports no violent behavior    Exam (detailed: at least 9 elements; comprehensive: all 15 elements)   Constitutional  Vitals:  Most recent vital signs, dated less than 90 days prior to this appointment, were reviewed.   There were no vitals filed for this visit.     General:  age appropriate, casually dressed, neatly groomed     Musculoskeletal  Muscle Strength/Tone:  tremor noted hands   Gait & Station:  non-ataxic     Psychiatric  Speech:  no latency; no press, dysarthia   Mood & Affect:  depressed  congruent and appropriate   Thought Process:  normal and logical, goal-directed   Associations:  intact   Thought Content:  normal, no suicidality, no homicidality, delusions, or paranoia   Insight:  intact   Judgement: behavior is adequate to circumstances   Orientation:  grossly intact   Memory: intact for content of interview   Language: grossly intact   Attention Span & Concentration:  able to focus   Fund of Knowledge:  intact and appropriate to age and level of education     Assessment and Diagnosis   Status/Progress: Based on the examination today, the patient's problem(s) is/are inadequately controlled.  New problems have been presented today.   Co-morbidities are complicating management of the primary condition.  There are no active rule-out diagnoses for this patient at this time.       Impression: Pt is a 63 Y/O woman with multiple medical issues including  Seizure disordr, PD, Dementia with       Major depressive disorder, mod, rec  Post traumatic stress disorder.         Strengths and Liabilities: Strength: Patient accepts guidance/feedback, Strength: Patient is expressive/articulate., Strength: Patient has reasonable judgment.     Treatment Goals:  Specify outcomes written in observable, behavioral terms:   Anxiety: acquiring relapse prevention skills and reducing time spent worrying (<30 minutes/day)  Depression: acquiring relapse prevention skills, increasing energy, increasing interest in usual activities, increasing motivation, increasing self-reward for positive behaviors (one/day), increasing self-reward for positive thoughts (one/day) and increasing social contacts (three/week)     Treatment Plan/Recommendations:   · Medication Management: Continue current medications. The risks and benefits of medication were discussed with the patient.  · Referral for further treatment to social work team for psychotherapy  · The treatment plan and follow up plan were reviewed with the patient.   · She is also on mirtazepine 15 mg at bedtime,it helps and she will continue this  · Will restart on cymbalta 30 mg daily.    · Discussed coping skills.  · Provided supportive psychotherapy.  · Encouraged pt to stay compliant with her medications.   · Have discussed with her that I am leaving [facility].           Return to Clinic: Pt has a follow up appointment with Dr Santos on 3/23 at the [facility].

## 2017-12-14 ENCOUNTER — OFFICE VISIT (OUTPATIENT)
Dept: PAIN MEDICINE | Facility: CLINIC | Age: 62
End: 2017-12-14
Attending: ANESTHESIOLOGY
Payer: MEDICARE

## 2017-12-14 VITALS
HEIGHT: 65 IN | DIASTOLIC BLOOD PRESSURE: 100 MMHG | SYSTOLIC BLOOD PRESSURE: 160 MMHG | HEART RATE: 79 BPM | TEMPERATURE: 98 F

## 2017-12-14 DIAGNOSIS — G21.9 SECONDARY PARKINSONISM, UNSPECIFIED SECONDARY PARKINSONISM TYPE: ICD-10-CM

## 2017-12-14 DIAGNOSIS — R56.9 SEIZURE: Primary | ICD-10-CM

## 2017-12-14 DIAGNOSIS — G56.41 COMPLEX REGIONAL PAIN SYNDROME TYPE 2 OF RIGHT UPPER EXTREMITY: ICD-10-CM

## 2017-12-14 DIAGNOSIS — M54.12 CERVICAL RADICULOPATHY: ICD-10-CM

## 2017-12-14 PROCEDURE — 99999 PR PBB SHADOW E&M-EST. PATIENT-LVL III: CPT | Mod: PBBFAC,,, | Performed by: ANESTHESIOLOGY

## 2017-12-14 PROCEDURE — 99214 OFFICE O/P EST MOD 30 MIN: CPT | Mod: S$GLB,,, | Performed by: ANESTHESIOLOGY

## 2017-12-14 RX ORDER — GABAPENTIN 300 MG/1
300 CAPSULE ORAL NIGHTLY
Qty: 30 CAPSULE | Refills: 11 | Status: SHIPPED | OUTPATIENT
Start: 2017-12-14 | End: 2018-12-24 | Stop reason: SDUPTHER
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
## 2017-12-15 ENCOUNTER — TELEPHONE (OUTPATIENT)
Dept: INTERNAL MEDICINE | Facility: CLINIC | Age: 62
End: 2017-12-15

## 2017-12-15 DIAGNOSIS — Z12.39 SCREENING FOR BREAST CANCER: Primary | ICD-10-CM

## 2017-12-22 ENCOUNTER — SURGERY (OUTPATIENT)
Age: 62
End: 2017-12-22

## 2017-12-22 ENCOUNTER — HOSPITAL ENCOUNTER (OUTPATIENT)
Facility: OTHER | Age: 62
Discharge: HOME | End: 2017-12-22
Attending: ANESTHESIOLOGY | Admitting: ANESTHESIOLOGY
Payer: MEDICARE

## 2017-12-22 VITALS
TEMPERATURE: 98 F | OXYGEN SATURATION: 97 % | DIASTOLIC BLOOD PRESSURE: 85 MMHG | RESPIRATION RATE: 16 BRPM | BODY MASS INDEX: 20.83 KG/M2 | HEART RATE: 104 BPM | WEIGHT: 125 LBS | SYSTOLIC BLOOD PRESSURE: 138 MMHG | HEIGHT: 65 IN

## 2017-12-22 DIAGNOSIS — G56.41 COMPLEX REGIONAL PAIN SYNDROME TYPE 2 OF RIGHT UPPER EXTREMITY: Primary | ICD-10-CM

## 2017-12-22 DIAGNOSIS — G89.29 CHRONIC PAIN: ICD-10-CM

## 2017-12-22 PROCEDURE — 64510 N BLOCK STELLATE GANGLION: CPT | Performed by: ANESTHESIOLOGY

## 2017-12-22 PROCEDURE — 99152 MOD SED SAME PHYS/QHP 5/>YRS: CPT | Mod: ,,, | Performed by: ANESTHESIOLOGY

## 2017-12-22 PROCEDURE — 25000003 PHARM REV CODE 250: Performed by: ANESTHESIOLOGY

## 2017-12-22 PROCEDURE — 64510 N BLOCK STELLATE GANGLION: CPT | Mod: ,,, | Performed by: ANESTHESIOLOGY

## 2017-12-22 PROCEDURE — 76942 ECHO GUIDE FOR BIOPSY: CPT | Performed by: ANESTHESIOLOGY

## 2017-12-22 PROCEDURE — 63600175 PHARM REV CODE 636 W HCPCS: Performed by: ANESTHESIOLOGY

## 2017-12-22 RX ORDER — LIDOCAINE HYDROCHLORIDE 10 MG/ML
INJECTION INFILTRATION; PERINEURAL
Status: DISCONTINUED | OUTPATIENT
Start: 2017-12-22 | End: 2017-12-22 | Stop reason: HOSPADM

## 2017-12-22 RX ORDER — MIDAZOLAM HYDROCHLORIDE 1 MG/ML
INJECTION INTRAMUSCULAR; INTRAVENOUS
Status: DISCONTINUED | OUTPATIENT
Start: 2017-12-22 | End: 2017-12-22 | Stop reason: HOSPADM

## 2017-12-22 RX ORDER — CLONIDINE 100 UG/ML
INJECTION, SOLUTION EPIDURAL
Status: DISCONTINUED | OUTPATIENT
Start: 2017-12-22 | End: 2017-12-22 | Stop reason: HOSPADM

## 2017-12-22 RX ORDER — SODIUM CHLORIDE 9 MG/ML
500 INJECTION, SOLUTION INTRAVENOUS CONTINUOUS
Status: DISCONTINUED | OUTPATIENT
Start: 2017-12-22 | End: 2017-12-22 | Stop reason: HOSPADM

## 2017-12-22 RX ADMIN — MIDAZOLAM HYDROCHLORIDE 3 MG: 1 INJECTION, SOLUTION INTRAMUSCULAR; INTRAVENOUS at 11:12

## 2017-12-22 RX ADMIN — LIDOCAINE HYDROCHLORIDE 10 ML: 10 INJECTION, SOLUTION INFILTRATION; PERINEURAL at 11:12

## 2017-12-22 RX ADMIN — CLONIDINE HYDROCHLORIDE 150 MCG: 100 INJECTION, SOLUTION INTRAVENOUS at 11:12

## 2017-12-22 RX ADMIN — MIDAZOLAM HYDROCHLORIDE 1 MG: 1 INJECTION, SOLUTION INTRAMUSCULAR; INTRAVENOUS at 11:12

## 2017-12-22 NOTE — INTERVAL H&P NOTE
The patient has been examined and the H&P has been reviewed:    I concur with the findings and no changes have occurred since H&P was written.     Cc: right arm pain    HPI: 61 yo female here today for right stellate ganglion block. Denies use of blood thinners. Denies recent fevers or infections.         ROS  Denies fevers, chills, unexplained weight changes  HEENT  Denies  acute vision changes,  seizures, sore throat  CV Denies chest pain/palpitations  Pulm  Denies sob  GI  denies abd pain/acute changes   denies burning/acute changes  Psych Denies homicidal/suicidal ideation      PE:  Vitals Reviewed    AAOx3    CTA Bilat  RRR  S/NABS  Palpable pulses  Moving all limbs    A/P:   Will proceed with right stellate ganglion block with sedation.         Anesthesia/Surgery risks, benefits and alternative options discussed and understood by patient/family.          Active Hospital Problems    Diagnosis  POA    Chronic pain [G89.29]  Yes      Resolved Hospital Problems    Diagnosis Date Resolved POA   No resolved problems to display.

## 2017-12-22 NOTE — DISCHARGE SUMMARY
Discharge Note  Short Stay      SUMMARY     Admit Date: 12/22/2017    Attending Physician: Brian Atkins    Discharge Diagnosis: Complex regional pain syndrome type 2 of right upper extremity [G56.41]    Discharge Physician: Brian Atkins      Discharge Date: 12/22/2017 11:45 AM       PROCEDURE:   Right  stellate ganglion block under ultrasound    REASON FOR PROCEDURE: Complex regional pain syndrome type 2 of right upper extremity [G56.41]      Disposition: Home or self care    Patient Instructions:   Current Discharge Medication List      CONTINUE these medications which have NOT CHANGED    Details   b complex vitamins tablet Take 1 tablet by mouth once daily.      cyanocobalamin (VITAMIN B-12) 1000 MCG tablet Take 100 mcg by mouth once daily.        diazePAM (VALIUM) 2 MG tablet TAKE 1 TABLET BY MOUTH 3 TIMES A DAY AS NEEDED FOR ANXIETY  Qty: 90 tablet, Refills: 5    Comments: This request is for a new prescription for a controlled substance as required by Federal/State law..      DULoxetine (CYMBALTA) 30 MG capsule Take 1 capsule (30 mg total) by mouth once daily. One a day for two weeks and then increase to two a day if tolerated.  Qty: 60 capsule, Refills: 2      folic acid (FOLVITE) 800 MCG Tab Take 800 mcg by mouth once daily.        gabapentin (NEURONTIN) 300 MG capsule Take 1 capsule (300 mg total) by mouth every evening.  Qty: 30 capsule, Refills: 11      levetiracetam (KEPPRA) 1000 MG tablet Take 1 tablet (1,000 mg total) by mouth 2 (two) times daily.  Qty: 60 tablet, Refills: 6      !! lisinopril 10 MG tablet TAKE 1 TABLET BY MOUTH EVERY DAY  Qty: 90 tablet, Refills: 3      !! lisinopril 10 MG tablet TAKE 1 TABLET BY MOUTH EVERY DAY  Qty: 90 tablet, Refills: 3      mirtazapine (REMERON) 15 MG tablet Take 1 tablet (15 mg total) by mouth every evening.  Qty: 30 tablet, Refills: 11       !! - Potential duplicate medications found. Please discuss with provider.          Resume home diet and  activity

## 2017-12-22 NOTE — DISCHARGE INSTRUCTIONS
Home Care Instructions Pain Management:    1. DIET:   You may resume your normal diet today.   2. BATHING:   You may shower with luke warm water.  3. DRESSING:   You may remove your bandage today.   4. ACTIVITY LEVEL:   You may resume your normal activities 24 hrs after your procedure.  5. MEDICATIONS:   You may resume your normal medications today.   6. SPECIAL INSTRUCTIONS:   No heat to the injection site for 24 hrs including, bath or shower, heating pad, moist heat, or hot tubs.    Use ice pack to injection site for any pain or discomfort.  Apply ice packs for 20 minute intervals as needed.   If you have received any sedatives by mouth today you may not drive for 12 hours.    If you have received any sedation through your IV, you may not drive for 24 hrs.     PLEASE CALL YOUR DOCTOR IF:  1. Redness or swelling around the injection site.  2. Fever of 101 degrees  3. Drainage (pus) from the injection site.  4. For any continuous bleeding (some dried blood over the incision is normal.)    FOR EMERGENCIES:   If any unusual problems or difficulties occur during clinic hours, call [phone] or [phone].     Thank you for allowing us to care for you today. You may receive a survey about the care we provided. Your feedback is valuable and helps us provide excellent care throughout the community.

## 2017-12-22 NOTE — OP NOTE
Stellate ganglion Block Under Ultrasound  Time-out taken to identify patient and procedure side prior to starting the procedure.                     12/22/2017                                              PROCEDURE:   Right  stellate ganglion block under ultrasound    REASON FOR PROCEDURE: Complex regional pain syndrome type 2 of right upper extremity [G56.41]    PHYSICIAN: Brian Atkins MD    Assistants:   Orestes Marie DO, PGY-5, Pain Fellow  I was present and supervising all critical portions of the procedure      MEDICATIONS INJECTED:  Lidocaine 1% 15 mL and 100mcg clonidine    ESTIMATED BLOOD LOSS:  None.    COMPLICATIONS:  None.    TECHNIQUE: Laying in a supine position, the patient was prepped and draped in the usual sterile fashion using ChloraPrep and sterile blue towels.  An ultrasound probe was also sheathed in a  sterile fashion. The level of the cricoid process was palpated and the C6 transverse process was identified under ultrasound, and then tracked down to the C7 transverse process.  The area over the skin was then anesthetized with 1 mL of 1% lidocaine through a 25-gauge needle.  Then a Stimuplex needle  was advanced under ultrasound visualization with care to avoid the vertebral artery and carotid vessels until the anterior border of the  longus coli  muscle was encountered in the area of the sympathetic chain  Once this was confirmed after negative aspiration there was injection of 10 mL of 1% lidocaine in 2 mL increments with repeat aspiration after each injection.  At no point was there any blood aspirated or any paresthesias.  The patient was communicative and able to blink on command throughout the process.    The patient was monitored after the procedure, and sitting upright.  Patient was given post procedure and discharge instructions to follow at home.  We will see the patient back in two weeks or the patient may call to inform of status. The patient was discharged in a stable  condition    Conscious sedation provided by M.D    The patient was monitored with continuous pulse oximetry, EKG, and intermittent blood pressure monitors.  The patient was hemodynamically stable throughout the entire process was responsive to voice, and breathing spontaneously.  Supplemental O2 was provided at 2L/min via nasal cannula.  Patient was comfortable for the duration of the procedure. (See nurse documentation and case log for sedation time)    There was a total of 4 mg IV Midazolam was titrated for the procedure
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
## 2018-01-23 ENCOUNTER — TELEPHONE (OUTPATIENT)
Dept: PAIN MEDICINE | Facility: CLINIC | Age: 63
End: 2018-01-23

## 2018-01-23 NOTE — TELEPHONE ENCOUNTER
Staff contacted the patient and left a voice message instructing the patient to give our office a call back to further discuss.

## 2018-01-23 NOTE — TELEPHONE ENCOUNTER
----- Message from Norma Vieyra sent at 1/23/2018  7:05 AM CST -----  Contact: CHANTEL GOMEZ [[id number]]  _x  1st Request  _  2nd Request  _  3rd Request        Who: CHANTEL GOMEZ [[id number]]    Why: Patient states she would like a call back to know why she needs a follow up appt. Please advise    What Number to Call Back: [phone]    When to Expect a call back: (Before the end of the day)   -- if call after 3:00 call back will be tomorrow.

## 2018-02-14 RX ORDER — MIRTAZAPINE 15 MG/1
15 TABLET, FILM COATED ORAL NIGHTLY
Qty: 30 TABLET | Refills: 10 | Status: SHIPPED | OUTPATIENT
Start: 2018-02-14 | End: 2018-03-23 | Stop reason: SDUPTHER

## 2018-03-01 RX ORDER — LEVETIRACETAM 1000 MG/1
1000 TABLET ORAL 2 TIMES DAILY
Qty: 60 TABLET | Refills: 6 | Status: SHIPPED | OUTPATIENT
Start: 2018-03-01 | End: 2018-11-25 | Stop reason: SDUPTHER

## 2018-03-07 ENCOUNTER — HOSPITAL ENCOUNTER (EMERGENCY)
Facility: OTHER | Age: 63
Discharge: HOME | End: 2018-03-07
Attending: EMERGENCY MEDICINE
Payer: MEDICARE

## 2018-03-07 ENCOUNTER — TELEPHONE (OUTPATIENT)
Dept: INTERNAL MEDICINE | Facility: CLINIC | Age: 63
End: 2018-03-07

## 2018-03-07 VITALS
OXYGEN SATURATION: 99 % | RESPIRATION RATE: 18 BRPM | DIASTOLIC BLOOD PRESSURE: 109 MMHG | BODY MASS INDEX: 21.66 KG/M2 | TEMPERATURE: 98 F | WEIGHT: 130 LBS | HEIGHT: 65 IN | HEART RATE: 85 BPM | SYSTOLIC BLOOD PRESSURE: 190 MMHG

## 2018-03-07 DIAGNOSIS — S00.33XA CONTUSION OF NOSE, INITIAL ENCOUNTER: ICD-10-CM

## 2018-03-07 DIAGNOSIS — S00.83XA CONTUSION OF FACE, INITIAL ENCOUNTER: Primary | ICD-10-CM

## 2018-03-07 DIAGNOSIS — F51.3 SLEEP WALKING: ICD-10-CM

## 2018-03-07 PROCEDURE — 96372 THER/PROPH/DIAG INJ SC/IM: CPT

## 2018-03-07 PROCEDURE — 63600175 PHARM REV CODE 636 W HCPCS: Performed by: PHYSICIAN ASSISTANT

## 2018-03-07 PROCEDURE — 99284 EMERGENCY DEPT VISIT MOD MDM: CPT | Mod: 25

## 2018-03-07 RX ORDER — KETOROLAC TROMETHAMINE 30 MG/ML
30 INJECTION, SOLUTION INTRAMUSCULAR; INTRAVENOUS
Status: COMPLETED | OUTPATIENT
Start: 2018-03-07 | End: 2018-03-07

## 2018-03-07 RX ADMIN — KETOROLAC TROMETHAMINE 30 MG: 30 INJECTION, SOLUTION INTRAMUSCULAR at 11:03

## 2018-03-07 NOTE — ED PROVIDER NOTES
"Encounter Date: 3/7/2018       History     Chief Complaint   Patient presents with    Fall     " My doctor recently put me on Lexapro for depression and it makes me sleep walk. This morning at 5AM I got up from my bed and I tripped and fell into a large mirror on the wall which broke. My nose stretd to bleed really bad". Pt unsure of LOC at time of fall.  Hx of Parkinson     62-year-old female with anxiety, depression, hypertension, Parkinson's disease, history of seizures documented dementia emergency department with complaints of injury to her nose.  She states that she was started on Lexapro by her physician for her depression which sometimes causes her to sleep walk.  She states that around 5:00 this morning she got up from bed and fell striking her face on a large mirror hanging on the wall. She reports that she initially had bleeding from her nose.  She states that she is unsure she had an episode of LOC.  She denies any nausea or vomiting.  She complains of pain to her forehead, nose and around her eyes.  She states the pain is a 10 out of 10.  No current treatment.      The history is provided by the patient.     Review of patient's allergies indicates:  No Known Allergies  Past Medical History:   Diagnosis Date    Anxiety     DEMENTIA     Depression     Hx of psychiatric care     Hypertension     Parkinson disease     Psychiatric problem     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    Seizures     Therapy     Transient loss of consciousness 9/28/2012    presumed seizures     History reviewed. No pertinent surgical history.  History reviewed. No pertinent family history.  Social History   Substance Use Topics    Smoking status: Never Smoker    Smokeless tobacco: Never Used    Alcohol use 1.0 oz/week     2 Standard drinks or equivalent per week     Review of Systems   Constitutional: Negative for chills and fever.   HENT: Positive for facial swelling and nosebleeds. Negative for sore throat.  "   Respiratory: Negative for shortness of breath.    Cardiovascular: Negative for chest pain.   Gastrointestinal: Negative for nausea and vomiting.   Genitourinary: Negative for dysuria.   Musculoskeletal: Negative for back pain.   Skin: Negative for rash.   Neurological: Positive for headaches. Negative for dizziness, syncope, weakness, light-headedness and numbness.   Hematological: Does not bruise/bleed easily.   Psychiatric/Behavioral: Negative for confusion.       Physical Exam     Initial Vitals [03/07/18 0844]   BP Pulse Resp Temp SpO2   121/80 93 18 97.1 °F (36.2 °C) 100 %      MAP       93.67         Physical Exam    Nursing note and vitals reviewed.  Constitutional: Vital signs are normal. She appears well-developed and well-nourished.  Non-toxic appearance. No distress.   HENT:   Head: Normocephalic and atraumatic.   Right Ear: Tympanic membrane, external ear and ear canal normal. No middle ear effusion.   Left Ear: Tympanic membrane, external ear and ear canal normal.  No middle ear effusion.   Nose: Sinus tenderness present. No nose lacerations, nasal deformity, septal deviation or nasal septal hematoma. No epistaxis.       Mouth/Throat: Oropharynx is clear and moist.   Swelling ecchymosis and TTP to the nasal bridge without palpable deformity. No septal hematoma   Eyes: Conjunctivae, EOM and lids are normal. Pupils are equal, round, and reactive to light. Right conjunctiva is not injected. Right conjunctiva has no hemorrhage. Left conjunctiva is not injected. Left conjunctiva has no hemorrhage. No scleral icterus. Right eye exhibits normal extraocular motion and no nystagmus. Left eye exhibits normal extraocular motion and no nystagmus. Right pupil is round and reactive. Left pupil is round and reactive. Pupils are equal.   Neck: Normal range of motion and phonation normal. Neck supple. No spinous process tenderness and no muscular tenderness present. Normal range of motion present. No neck rigidity.    Cardiovascular: Normal rate, regular rhythm, normal heart sounds, intact distal pulses and normal pulses. Exam reveals no gallop, no friction rub and no decreased pulses.    No murmur heard.  Pulmonary/Chest: Breath sounds normal. No respiratory distress. She has no wheezes. She has no rhonchi. She has no rales.   Abdominal: Normal appearance.   Musculoskeletal: Normal range of motion.   No obvious deformities, moving all extremities, normal gait   Neurological: She is alert and oriented to person, place, and time. She has normal strength. She displays tremor. No sensory deficit.   Skin: Skin is warm, dry and intact. No lesion and no rash noted. No erythema.   Psychiatric: She has a normal mood and affect. Her speech is normal and behavior is normal. Judgment normal. Cognition and memory are normal.         ED Course   Procedures  Labs Reviewed - No data to display           Imaging Results          CT Maxillofacial Without Contrast (Final result)  Result time 03/07/18 10:08:13    Final result by Sybil Nielsen MD (03/07/18 10:08:13)                 Impression:      No acute abnormality.    Generalized cerebral volume loss.    Anterior dislocation of the right temporomandibular joint with remote fracture deformity of the right mandibular condyle.    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      Electronically signed by: SYBIL NIELSEN  Date:     03/07/18  Time:    10:08              Narrative:    CLINICAL INDICATION: 62 year old F with head trauma and headache     TECHNIQUE: CT head and maxillofacial bones without contrast. Axial CT images obtained throughout the head and thin cut image throughout the maxillofacial bones without intravenous contrast. Sagittal and coronal reconstructions were performed.    COMPARISON: MRI brain 2/8/15 min. Head CT 2/7/15. Head and maxillofacial CT 9/22/14 and 10/15/12    FINDINGS:    Intracranial compartment:    Ventricles and sulci are normal in size for age without  evidence of hydrocephalus. No extra-axial blood or fluid collections.    Remote lacunar infarct in anterior limb of the right internal capsule is unchanged. A The brain parenchyma appears otherwise normal. No parenchymal mass, hemorrhage, edema or major vascular distribution infarct.      Facial bones: No fracture.    Subcutaneous soft tissues: Normal.    Orbits: Normal.    Paranasal sinuses: Clear.    Aerodigestive tract: Normal. Incidental torus palatinus.    Lymph nodes: No pathologic adenopathy.    Salivary glands: Normal.    Vascular structures: Normal.    Skull: Normal.    Cervical spine (imaged portions): Normal.    Other findings: Severe degeneration of the left temporomandibular joint. Anterior dislocation of the right temporomandibular joint with remote fracture deformity of the right mandibular condyle.                             CT Head Without Contrast (Final result)  Result time 03/07/18 10:08:13    Final result by Sybil Nielsen MD (03/07/18 10:08:13)                 Impression:      No acute abnormality.    Generalized cerebral volume loss.    Anterior dislocation of the right temporomandibular joint with remote fracture deformity of the right mandibular condyle.    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      Electronically signed by: SYBIL NIELSEN  Date:     03/07/18  Time:    10:08              Narrative:    CLINICAL INDICATION: 62 year old F with head trauma and headache     TECHNIQUE: CT head and maxillofacial bones without contrast. Axial CT images obtained throughout the head and thin cut image throughout the maxillofacial bones without intravenous contrast. Sagittal and coronal reconstructions were performed.    COMPARISON: MRI brain 2/8/15 min. Head CT 2/7/15. Head and maxillofacial CT 9/22/14 and 10/15/12    FINDINGS:    Intracranial compartment:    Ventricles and sulci are normal in size for age without evidence of hydrocephalus. No extra-axial blood or fluid  collections.    Remote lacunar infarct in anterior limb of the right internal capsule is unchanged. A The brain parenchyma appears otherwise normal. No parenchymal mass, hemorrhage, edema or major vascular distribution infarct.      Facial bones: No fracture.    Subcutaneous soft tissues: Normal.    Orbits: Normal.    Paranasal sinuses: Clear.    Aerodigestive tract: Normal. Incidental torus palatinus.    Lymph nodes: No pathologic adenopathy.    Salivary glands: Normal.    Vascular structures: Normal.    Skull: Normal.    Cervical spine (imaged portions): Normal.    Other findings: Severe degeneration of the left temporomandibular joint. Anterior dislocation of the right temporomandibular joint with remote fracture deformity of the right mandibular condyle.                              Medical Decision Making:   History:   Old Medical Records: I decided to obtain old medical records.  Initial Assessment:   62-year-old female with complaints consistent with facial and nose contusion status post sleepwalking incident.  Vital signs stable, afebrile, neurovascular intact.  No focal neurological deficits.  Patient does have tremor secondary to Parkinson's disease.  Exam documented above.  ED Management:  CT of the face and head were obtained.  Patient has remote fracture to the right mandible with questionable dislocation however on exam this does not appear to be consistent.  She states that her jaw has been like this for over 2 years after she had a fall where she broke her jaw.  She is able to fully open her mouth without any discomfort and has no tenderness palpation over the jaw.  I do feel that this is likely an old injury and not acute.  No acute fracture to the nose.  No evidence of internal hemorrhage or mass.  She was administered IM Toradol and ice pack was given for her to apply to her nose.  She is stable and will be discharged home with care instructions.  Patient reports that she is already stopped taking  "the Lexapro.  She is urged to continue without this medication until she follows up with her primary care physician.  Patient offered appointment for tomorrow morning at 7 AM with her primary care physician however she states that she is "not a morning person" and was in turn scheduled for the next available appointment which was March 13 at 220.  This patient was discussed with the attending physician who also evaluated her and agrees with treatment plan.  Other:   I have discussed this case with another health care provider.       <> Summary of the Discussion: Jauregui  This note was created using Dragon Medical dictation.  There may be typographical errors secondary to dictation.                        Clinical Impression:     1. Contusion of face, initial encounter    2. Contusion of nose, initial encounter    3. Sleep walking          Disposition:   Disposition: Discharged  Condition: Stable                        Tova Hess PA-C  03/07/18 1133    "

## 2018-03-07 NOTE — ED NOTES
"Pt with pmh of parkinsons disease, reports she sleep walks at night due to taking seroquel, stating "my drs are trying to find the right medicine for me." Pt reporting she was "sleep walking last night and tripped and fell and hit my forehead and nose on a wooden mirror, I lost consciousness." Pt with redness to bridge of nose noted. Pt also states "my nose was bleeding so much, there was a puddle on the ground." No active epistaxis at this time. Pt AAOx4 and appropriate at this time. Respirations even and unlabored. No acute distress noted.   "

## 2018-03-07 NOTE — TELEPHONE ENCOUNTER
----- Message from Erika Fernandez sent at 3/7/2018  4:22 PM CST -----  Contact: pt  x_  1st Request  _  2nd Request  _  3rd Request    Who: CHANTEL GOMEZ [[id number]]    Why: Patient would like to speak with staff in reference to getting pain medicine for face pain. Pt states her previous medication made her sleep walk.     What Number to Call Back:[phone]    When to Expect a call back: (Within 24 hours)    Please return the call at earliest convenience. Thanks!

## 2018-03-09 ENCOUNTER — TELEPHONE (OUTPATIENT)
Dept: INTERNAL MEDICINE | Facility: CLINIC | Age: 63
End: 2018-03-09

## 2018-03-09 NOTE — TELEPHONE ENCOUNTER
"----- Message from Norma Vieyra sent at 3/9/2018  7:26 AM CST -----  Contact: CHANTEL GOMEZ [[id number]]  x_  1st Request  _  2nd Request  _  3rd Request        Who: CHANTEL GOMEZ [[id number]]    Why: Patient states she is calling to speak with Dr. Del Valle in regards to getting an antidepressant medication. Patient states "she is not herself" and she is shaking all the time and is very unhappy. Patient states she would like a call back as soon as possible. Please call back to discuss.    What Number to Call Back: [phone]    When to Expect a call back: (Before the end of the day)   -- if call after 3:00 call back will be tomorrow.'  "

## 2018-03-09 NOTE — TELEPHONE ENCOUNTER
----- Message from Violeta Lim sent at 3/9/2018 12:13 PM CST -----  Contact: pt   x 1st Request  _ 2nd Request  _ 3rd Request    Who:pt     Why:pt is returning [facility] call. Please call and advise.     What Number to Call Back:[phone]     When to Expect a call back: (Before the end of the day)  -- if call after 3:00 call back will be tomorrow.

## 2018-03-13 ENCOUNTER — TELEPHONE (OUTPATIENT)
Dept: INTERNAL MEDICINE | Facility: CLINIC | Age: 63
End: 2018-03-13

## 2018-03-13 ENCOUNTER — LAB VISIT (OUTPATIENT)
Dept: LAB | Facility: OTHER | Age: 63
End: 2018-03-13
Attending: INTERNAL MEDICINE
Payer: MEDICARE

## 2018-03-13 ENCOUNTER — OFFICE VISIT (OUTPATIENT)
Dept: INTERNAL MEDICINE | Facility: CLINIC | Age: 63
End: 2018-03-13
Payer: MEDICARE

## 2018-03-13 VITALS — SYSTOLIC BLOOD PRESSURE: 180 MMHG | HEIGHT: 65 IN | DIASTOLIC BLOOD PRESSURE: 100 MMHG | HEART RATE: 99 BPM

## 2018-03-13 DIAGNOSIS — I10 ESSENTIAL HYPERTENSION, BENIGN: Chronic | ICD-10-CM

## 2018-03-13 DIAGNOSIS — F03.90 DEMENTIA WITHOUT BEHAVIORAL DISTURBANCE, UNSPECIFIED DEMENTIA TYPE: Chronic | ICD-10-CM

## 2018-03-13 DIAGNOSIS — G21.9 SECONDARY PARKINSONISM, UNSPECIFIED SECONDARY PARKINSONISM TYPE: Primary | ICD-10-CM

## 2018-03-13 DIAGNOSIS — F41.9 ANXIETY: ICD-10-CM

## 2018-03-13 DIAGNOSIS — G56.41 COMPLEX REGIONAL PAIN SYNDROME TYPE 2 OF RIGHT UPPER EXTREMITY: ICD-10-CM

## 2018-03-13 DIAGNOSIS — R56.9 SEIZURE: ICD-10-CM

## 2018-03-13 DIAGNOSIS — F33.1 MDD (MAJOR DEPRESSIVE DISORDER), RECURRENT EPISODE, MODERATE: ICD-10-CM

## 2018-03-13 DIAGNOSIS — E87.6 HYPOKALEMIA: Primary | ICD-10-CM

## 2018-03-13 LAB
ALBUMIN SERPL BCP-MCNC: 4 G/DL
ALP SERPL-CCNC: 66 U/L
ALT SERPL W/O P-5'-P-CCNC: 23 U/L
ANION GAP SERPL CALC-SCNC: 11 MMOL/L
AST SERPL-CCNC: 30 U/L
BASOPHILS # BLD AUTO: 0.01 K/UL
BASOPHILS NFR BLD: 0.3 %
BILIRUB SERPL-MCNC: 0.6 MG/DL
BUN SERPL-MCNC: 13 MG/DL
CALCIUM SERPL-MCNC: 9.3 MG/DL
CHLORIDE SERPL-SCNC: 104 MMOL/L
CO2 SERPL-SCNC: 27 MMOL/L
CREAT SERPL-MCNC: 0.7 MG/DL
DIFFERENTIAL METHOD: ABNORMAL
EOSINOPHIL # BLD AUTO: 0.1 K/UL
EOSINOPHIL NFR BLD: 1.5 %
ERYTHROCYTE [DISTWIDTH] IN BLOOD BY AUTOMATED COUNT: 14.4 %
EST. GFR  (AFRICAN AMERICAN): >60 ML/MIN/1.73 M^2
EST. GFR  (NON AFRICAN AMERICAN): >60 ML/MIN/1.73 M^2
GLUCOSE SERPL-MCNC: 101 MG/DL
HCT VFR BLD AUTO: 34.7 %
HGB BLD-MCNC: 11.3 G/DL
LYMPHOCYTES # BLD AUTO: 1 K/UL
LYMPHOCYTES NFR BLD: 29.7 %
MCH RBC QN AUTO: 33.4 PG
MCHC RBC AUTO-ENTMCNC: 32.6 G/DL
MCV RBC AUTO: 103 FL
MONOCYTES # BLD AUTO: 0.3 K/UL
MONOCYTES NFR BLD: 9 %
NEUTROPHILS # BLD AUTO: 2 K/UL
NEUTROPHILS NFR BLD: 59.2 %
PLATELET # BLD AUTO: 172 K/UL
PMV BLD AUTO: 10.4 FL
POTASSIUM SERPL-SCNC: 3.3 MMOL/L
PROT SERPL-MCNC: 6.9 G/DL
RBC # BLD AUTO: 3.38 M/UL
SODIUM SERPL-SCNC: 142 MMOL/L
WBC # BLD AUTO: 3.43 K/UL

## 2018-03-13 PROCEDURE — 85025 COMPLETE CBC W/AUTO DIFF WBC: CPT

## 2018-03-13 PROCEDURE — 3080F DIAST BP >= 90 MM HG: CPT | Mod: CPTII,S$GLB,, | Performed by: INTERNAL MEDICINE

## 2018-03-13 PROCEDURE — 99214 OFFICE O/P EST MOD 30 MIN: CPT | Mod: S$GLB,,, | Performed by: INTERNAL MEDICINE

## 2018-03-13 PROCEDURE — 36415 COLL VENOUS BLD VENIPUNCTURE: CPT

## 2018-03-13 PROCEDURE — 80053 COMPREHEN METABOLIC PANEL: CPT

## 2018-03-13 PROCEDURE — 99999 PR PBB SHADOW E&M-EST. PATIENT-LVL IV: CPT | Mod: PBBFAC,,, | Performed by: INTERNAL MEDICINE

## 2018-03-13 PROCEDURE — 3077F SYST BP >= 140 MM HG: CPT | Mod: CPTII,S$GLB,, | Performed by: INTERNAL MEDICINE

## 2018-03-13 RX ORDER — LISINOPRIL 20 MG/1
20 TABLET ORAL DAILY
Qty: 90 TABLET | Refills: 1 | Status: SHIPPED | OUTPATIENT
Start: 2018-03-13 | End: 2018-09-19 | Stop reason: SDUPTHER

## 2018-03-13 NOTE — PROGRESS NOTES
Subjective:       Patient ID: Breonna Silva is a 62 y.o. female.    Chief Complaint: Sleeping Problem (walking)    Pt here for f/u from ED where she was seen for facial trauma resulting from sleep walking into mirror. She attributes this to lexapro which she has since stopped. Record reviewed. She is not having any pain or symptoms related to most recent event. No evidence for seizures.     She has parkinson's but has not seen neuro recently. She has trouble remembering things and has a known dementia syndrome with suspect FTD. She is willing to see neuro again but would like to see someone else. She is living at assisted living facility where she says she administers her own medication.     She has dx Sz disorder. However, sz history is questionable and its relation to etoh intake remains possible. She takes keppra. No sz recently.      She reports depression. She has been on cymbalta but didn't find effective so stopped. She was most recently on lexapro but finds it made her sleep walk so stopped as above. No SI/HI. She is seeing psychiatry and has appt next month.     She has chronic pain in R arm related to cervical radiculopathy. She is seeing pain mgmt. She had injection which did help but was told to start gabapentin but she did not do so.     Pt's BP is not well controlled. Tolerating meds well. Pt denies cp/sob/ha/vision or neuro changes. Not checking at home.       Hypertension   Pertinent negatives include no chest pain, headaches or shortness of breath.     Review of Systems   Constitutional: Negative for fatigue and unexpected weight change.   Eyes: Negative for visual disturbance.   Respiratory: Negative for shortness of breath.    Cardiovascular: Negative for chest pain.   Gastrointestinal: Negative for blood in stool.   Neurological: Negative for weakness and headaches.   Psychiatric/Behavioral: Positive for dysphoric mood.       Objective:      Physical Exam   Constitutional: She is oriented to  person, place, and time. She appears well-developed and well-nourished.   Eyes: EOM are normal. Pupils are equal, round, and reactive to light.   Neck: Neck supple. No thyromegaly present.   Cardiovascular: Normal rate, regular rhythm and normal heart sounds.    Pulmonary/Chest: Effort normal and breath sounds normal.   Musculoskeletal: She exhibits no edema.   Lymphadenopathy:     She has no cervical adenopathy.   Neurological: She is alert and oriented to person, place, and time. No cranial nerve deficit.   Psychiatric: Her affect is blunt. Her speech is delayed. She is slowed. Thought content is not paranoid. She expresses no suicidal ideation. She exhibits abnormal recent memory.       Assessment:       1. Secondary parkinsonism, unspecified secondary Parkinsonism type    2. Anxiety    3. MDD (major depressive disorder), recurrent episode, moderate    4. Essential hypertension, benign    5. Seizure    6. Dementia without behavioral disturbance, unspecified dementia type    7. Complex regional pain syndrome type 2 of right upper extremity        Plan:       1. Keep f/u with specialists including psychiatry and neuro  2. Appropriate labs  3. Increase lisinopril to 20mg daily  4. F/u 2 wks nursing BP check  5. Neuro referral

## 2018-03-13 NOTE — TELEPHONE ENCOUNTER
Inform pt that potassium was a little low and to increase K in her diet with foods such as tomatoes, citrus fruits, bananas. We increased her dose of lisinopril for BP which will also help with K. Please schedule repeat lab in 2 wks. Ok to do same day as her BP check.

## 2018-03-15 NOTE — TELEPHONE ENCOUNTER
Pt informed of lab results and advice. States she is still Shaking. Pcp is aware of issue, states when she came to the office she could barely walk, PCP had to help her off the exam table walk, states PCP stated what would you like for me to do? States she is unhappy and PCP advice. Declined to schedule an appt with another provider.  States excessive shaking wasn't addressed, she has no energy, states she will go to the orthopedic as advised. States she will call the office to schedule f/u lab and BP check. Pt d/c call

## 2018-03-23 ENCOUNTER — OFFICE VISIT (OUTPATIENT)
Dept: PSYCHIATRY | Facility: CLINIC | Age: 63
End: 2018-03-23
Payer: MEDICARE

## 2018-03-23 VITALS — HEIGHT: 65 IN | HEART RATE: 90 BPM | DIASTOLIC BLOOD PRESSURE: 90 MMHG | SYSTOLIC BLOOD PRESSURE: 177 MMHG

## 2018-03-23 DIAGNOSIS — F06.34 MOOD DISORDER WITH MIXED FEATURES DUE TO GENERAL MEDICAL CONDITION: Primary | ICD-10-CM

## 2018-03-23 DIAGNOSIS — F60.89 CLUSTER B PERSONALITY DISORDER: ICD-10-CM

## 2018-03-23 PROBLEM — F60.9 PERSONALITY DISORDER: Status: ACTIVE | Noted: 2018-03-23

## 2018-03-23 PROCEDURE — 99215 OFFICE O/P EST HI 40 MIN: CPT | Mod: S$GLB,,, | Performed by: PSYCHIATRY & NEUROLOGY

## 2018-03-23 PROCEDURE — 99999 PR PBB SHADOW E&M-EST. PATIENT-LVL III: CPT | Mod: PBBFAC,,, | Performed by: PSYCHIATRY & NEUROLOGY

## 2018-03-23 RX ORDER — MIRTAZAPINE 30 MG/1
30 TABLET, FILM COATED ORAL NIGHTLY
Qty: 30 TABLET | Refills: 1 | Status: SHIPPED | OUTPATIENT
Start: 2018-03-23 | End: 2018-05-26 | Stop reason: SDUPTHER
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
## 2018-03-23 NOTE — PATIENT INSTRUCTIONS
[facility]  Mental Health Medications: Antidepressants    What are the side effects?    Antidepressants may cause mild side effects that usually do not last long. Any unusual reactions or side effects should be reported to a doctor immediately.  The most common side effects associated with SSRIs and SNRIs include:   Headache, which usually goes away within a few days.   Nausea (feeling sick to your stomach), which usually goes away within a few days.   Sleeplessness or drowsiness, which may happen during the first few weeks but then goes away. Sometimes the medication dose needs to be reduced or the time of day it is taken needs to be adjusted to help lessen these side effects.   Agitation (feeling jittery).   Sexual problems, which can affect both men and women and may include reduced sex drive, and problems having and enjoying sex.    Tricyclic antidepressants can cause side effects, including:   Dry mouth    Constipation    Bladder problems. It may be hard to empty the bladder, or the urine stream may not be as strong as usual. Older men with enlarged prostate conditions may be more affected.  Sexual problems, which can affect both men and women and may include reduced sex drive, and problems having and enjoying sex.   Blurred vision, which usually goes away quickly.   Drowsiness. Usually, antidepressants that make you drowsy are taken at bedtime.    People taking MAOIs need to be careful about the foods they eat and the medicines they take. Foods and medicines that contain high levels of a chemical called tyramine are dangerous for people taking MAOIs. Tyramine is found in some cheeses, elizabeth, and pickles. The chemical is also in some medications, including decongestants and over-the-counter cold medicine.    Mixing MAOIs and tyramine can cause a sharp increase in blood pressure, which can lead to stroke. People taking MAOIs should ask their doctors for a complete list of  foods, medicines, and other substances to avoid. An MAOI skin patch has recently been developed and may help reduce some of these risks. A doctor can help a person figure out if a patch or a pill will work for him or her.    How should antidepressants be taken?    People taking antidepressants need to follow their doctors directions. The medication should be taken in the right dose for the right amount of time. It can take three or four weeks until the medicine takes effect. Some people take the medications for a short time, and some people take them for much longer periods. People with long-term or severe depression may need to take medication for a long time.    Once a person is taking antidepressants, it is important not to stop taking them without the help of a doctor. Sometimes people taking antidepressants feel better and stop taking the medication too soon, and the depression may return. When it is time to stop the medication, the doctor will help the person slowly and safely decrease the dose. Its important to give the body time to adjust to the change. People dont get addicted, or hooked, on the medications, but stopping them abruptly can cause withdrawal symptoms.    FDA warning on antidepressants    Antidepressants are safe and popular, but some studies have suggested that they may have unintentional effects, especially in young people. In 2004, the FDA looked at published and unpublished data on trials of antidepressants that involved nearly 4,400 children and adolescents. They found that 4 percent of those taking antidepressants thought about or tried suicide (although no suicides occurred), compared to  2 percent of those receiving placebos (sugar pill).    In 2005, the FDA decided to adopt a black box warning label--the most serious type of warning-- on all antidepressant medications. The warning says there is an increased risk of suicidal thinking or attempts in children and adolescents taking  "antidepressants. In 2007, the FDA proposed that makers of all antidepressant medications extend the warning to include young adults up through age 24.    The warning also says that patients of all ages taking antidepressants should be watched closely, especially during the first few weeks of treatment. Possible side effects to look for are depression that gets worse, suicidal thinking or behavior,  or any unusual changes in behavior such as trouble sleeping, agitation, or withdrawal from normal social situations. Families and caregivers should report any changes to the doctor. The latest information from the FDA can be found at http://www.fda.gov.    Results of a comprehensive review of pediatric trials conducted between 1988 and 2006 suggested that the benefits of antidepressant medications likely outweigh their risks to children and adolescents with major depression and anxiety disorders. The study was funded in part by [facility].    Finally, the FDA has warned that combining the newer SSRI or SNRI antidepressants with one or more serotonin based medication - such as the commonly-used triptan medications used to treat migraine headaches - could cause a life- threatening illness called serotonin syndrome.    What is serotonin syndrome? -- Serotonin syndrome is a serious problem that can cause a number of symptoms, including:  ?Feeling anxious, restless, or confused  ?Sweating  ?Muscle spasms or muscles that cannot relax normally  ?Very fast back-and-forth eye movements  ?Shaking or trembling  ?Fever  ?A fast heartbeat  ?Vomiting  ?Diarrhea  What causes serotonin syndrome? -- Serotonin syndrome can happen to people after they take certain medicines or combinations of medicines. It can also happen with certain herbal products and street drugs. There are many medicines and drugs that can lead to serotonin syndrome. In general, they all affect a chemical in the brain and body called "serotonin."    Examples of the " "medicines and drugs that can cause serotonin syndrome include:  ?Some medicines used to treat depression  ?Some medicines used to treat Parkinson disease, such as selegiline (sample brand name: Eldepryl) and rasagiline (brand name: Azilect)  ?Some pain relievers, such as meperidine (sample brand name: Demerol), tramadol (sample brand name: Ultram), and cyclobenzaprine (sample brand name: Flexeril)  ?Saint Hossein's wort (an herbal medicine sometimes used for depression)  ?Medicines used to treat migraine headaches, called "triptans"  ?Some antibiotics, such as linezolid (brand name: Zyvox)  ?Street drugs, such as ecstasy, cocaine, and amphetamines    Doctors do not know why some people get serotonin syndrome and others do not. But they do know that people usually get it within hours of taking a new medicine or drug, a new dose, or a new combination of medicines or drugs.  Should I see a doctor or nurse? -- Yes. If you have symptoms of serotonin syndrome, and you recently took a new drug or medicine or a new dose, call your doctor right away. If your symptoms are severe, go to the emergency room or call for an ambulance (in the [place] and [place], dial [id number]).  Will I need tests? -- Maybe. There is no one test that can show whether or not you have serotonin syndrome. Still, some of the things the doctor will do during the exam can help him or her figure out if you have it. For instance, the doctor might test your leg muscles to see if they spasm. The doctor will also ask a lot of questions about any medicines, herbal products, or street drugs you might have taken.  If you have serotonin syndrome, it's very important that you be honest with your doctor about what you took, how much, and when.  How is serotonin syndrome treated? -- As part of treatment, the doctor will stop the medicines or drugs that caused the serotonin syndrome in the first place. He or she will also monitor your breathing, heart rate, blood pressure, and " body temperature, and try to keep these as close to normal as possible.   Depending on what you need, he or she might also:  ?Give you medicines to calm you  ?Give you medicines to block the effects of serotonin  ?Work with you to decide whether you should keep taking the medicines that caused your serotonin syndrome  Can serotonin syndrome be prevented? -- No, but there are things you can do to protect yourself.  Doctors have no way to predict who will get serotonin syndrome, so it's not possible to prevent cases caused by properly prescribed medicines. Even so, there are things you can do to protect yourself from dangerous reactions to medicines:  ?Always tell any doctor who prescribes medicines for you about all the medicines, herbal products, and street drugs you take. That way, the doctor can be careful not to give you medicines that could cause problems when combined.  ?When starting a new medicine, have the pharmacist check for drug interactions and double-check that you are taking the right amount.  ?If you are already on medicine, do not take a new herbal or over-the-counter medicine without first checking with your doctor, nurse, or pharmacist

## 2018-03-29 ENCOUNTER — TELEPHONE (OUTPATIENT)
Dept: INTERNAL MEDICINE | Facility: CLINIC | Age: 63
End: 2018-03-29

## 2018-03-29 NOTE — TELEPHONE ENCOUNTER
----- Message from Violeta Lim sent at 3/29/2018  9:19 AM CDT -----  Contact: pt   x 1st Request  _ 2nd Request  _ 3rd Request    Who:pt     Why:Pt is requesting to speak to nurse in regards to her issues she is having with her medications. Please call and advise.      What Number to Call Back:[phone]     When to Expect a call back: (Before the end of the day)  -- if call after 3:00 call back will be tomorrow.

## 2018-03-29 NOTE — TELEPHONE ENCOUNTER
Left a message for pt regarding medication issues. pt was advised in the message to contact our office to discuss medication issue. CF

## 2018-04-09 ENCOUNTER — TELEPHONE (OUTPATIENT)
Dept: NEUROLOGY | Facility: CLINIC | Age: 63
End: 2018-04-09

## 2018-04-09 NOTE — TELEPHONE ENCOUNTER
Left a message for the patient asking if she could come in 30 minutes earlier for her appointment. She was asked to call back to confirm this message.

## 2018-05-09 ENCOUNTER — OFFICE VISIT (OUTPATIENT)
Dept: NEUROLOGY | Facility: CLINIC | Age: 63
End: 2018-05-09
Payer: MEDICARE

## 2018-05-09 ENCOUNTER — LAB VISIT (OUTPATIENT)
Dept: LAB | Facility: HOSPITAL | Age: 63
End: 2018-05-09
Attending: PSYCHIATRY & NEUROLOGY
Payer: MEDICARE

## 2018-05-09 VITALS — HEIGHT: 65 IN | SYSTOLIC BLOOD PRESSURE: 185 MMHG | DIASTOLIC BLOOD PRESSURE: 110 MMHG | HEART RATE: 86 BPM

## 2018-05-09 DIAGNOSIS — R27.0 ATAXIA: ICD-10-CM

## 2018-05-09 DIAGNOSIS — G21.9 SECONDARY PARKINSONISM, UNSPECIFIED SECONDARY PARKINSONISM TYPE: ICD-10-CM

## 2018-05-09 DIAGNOSIS — F03.90 DEMENTIA WITHOUT BEHAVIORAL DISTURBANCE, UNSPECIFIED DEMENTIA TYPE: Primary | Chronic | ICD-10-CM

## 2018-05-09 DIAGNOSIS — F03.90 DEMENTIA WITHOUT BEHAVIORAL DISTURBANCE, UNSPECIFIED DEMENTIA TYPE: Chronic | ICD-10-CM

## 2018-05-09 LAB
AMMONIA PLAS-SCNC: 37 UMOL/L
CREAT SERPL-MCNC: 0.7 MG/DL
EST. GFR  (AFRICAN AMERICAN): >60 ML/MIN/1.73 M^2
EST. GFR  (NON AFRICAN AMERICAN): >60 ML/MIN/1.73 M^2
T4 SERPL-MCNC: 5.4 UG/DL
THYROGLOB AB SERPL IA-ACNC: <4 IU/ML
THYROPEROXIDASE IGG SERPL-ACNC: <6 IU/ML
TSH SERPL DL<=0.005 MIU/L-ACNC: 1.76 UIU/ML

## 2018-05-09 PROCEDURE — 84443 ASSAY THYROID STIM HORMONE: CPT

## 2018-05-09 PROCEDURE — 82140 ASSAY OF AMMONIA: CPT

## 2018-05-09 PROCEDURE — 84436 ASSAY OF TOTAL THYROXINE: CPT

## 2018-05-09 PROCEDURE — 36415 COLL VENOUS BLD VENIPUNCTURE: CPT

## 2018-05-09 PROCEDURE — 82565 ASSAY OF CREATININE: CPT

## 2018-05-09 PROCEDURE — 86256 FLUORESCENT ANTIBODY TITER: CPT

## 2018-05-09 PROCEDURE — 3080F DIAST BP >= 90 MM HG: CPT | Mod: CPTII,GC,S$GLB, | Performed by: PSYCHIATRY & NEUROLOGY

## 2018-05-09 PROCEDURE — 83516 IMMUNOASSAY NONANTIBODY: CPT | Mod: 59

## 2018-05-09 PROCEDURE — 84446 ASSAY OF VITAMIN E: CPT

## 2018-05-09 PROCEDURE — 99215 OFFICE O/P EST HI 40 MIN: CPT | Mod: GC,S$GLB,, | Performed by: PSYCHIATRY & NEUROLOGY

## 2018-05-09 PROCEDURE — 84425 ASSAY OF VITAMIN B-1: CPT

## 2018-05-09 PROCEDURE — 86341 ISLET CELL ANTIBODY: CPT

## 2018-05-09 PROCEDURE — 3077F SYST BP >= 140 MM HG: CPT | Mod: CPTII,GC,S$GLB, | Performed by: PSYCHIATRY & NEUROLOGY

## 2018-05-09 PROCEDURE — 99999 PR PBB SHADOW E&M-EST. PATIENT-LVL III: CPT | Mod: PBBFAC,GC,, | Performed by: PSYCHIATRY & NEUROLOGY

## 2018-05-09 PROCEDURE — 86376 MICROSOMAL ANTIBODY EACH: CPT

## 2018-05-09 PROCEDURE — 86800 THYROGLOBULIN ANTIBODY: CPT

## 2018-05-10 ENCOUNTER — TELEPHONE (OUTPATIENT)
Dept: NEUROLOGY | Facility: CLINIC | Age: 63
End: 2018-05-10

## 2018-05-10 NOTE — TELEPHONE ENCOUNTER
Patient states she is having pain on her right side. Would like to know what you can give her for the pain.

## 2018-05-11 LAB
GLIADIN PEPTIDE IGA SER-ACNC: 21 UNITS
GLIADIN PEPTIDE IGG SER-ACNC: 4 UNITS

## 2018-05-14 LAB
A-TOCOPHEROL VIT E SERPL-MCNC: 909 UG/DL (ref 500–1800)
GAD65 AB SER-SCNC: 0 NMOL/L
VIT B1 SERPL-MCNC: 75 UG/L (ref 38–122)

## 2018-05-14 NOTE — PROGRESS NOTES
"Patient Name: Breonna Silva  MRN: [id number]    CC:     HPI: Breonna Silva is a 63 y.o. female with PMHx of depression, chronic pain ,dementia, seizure disorder, mood disorder who was a previous patient of Dr. Mccray's - being seen for secondary parkinsonism.   Patient is a poor historian, tangential and hard to re-direct. Chart reviewed.   Patient is currently in assisted living facility which she states she does not like. States that "I don't like that I am with all these old people with alzheimers, I am not old, it is depressing".   History of alcohol abuse- reports she now drinks intermittently - states that is the only thing that helps her "shakes". Reports drinking only on days when she "feels like dressing up and going out". Unable to ascertain how often that is and how much.   Of note, patient also reports that everything she eats or drinks including alcohol is followed by diarrhea almost immediately.   Patient does not remember being on levodopa however per notes, has been on levodopa in the past which she reported not tolerating - possibly dizziness being the cause. Unable to determine if patient has been on antipsychotics in the past.   She is currently being followed by Dr. Santos in psychiatry.     ROS:   Review of Systems   Constitutional: Negative for malaise/fatigue. Negative for weight loss.   HENT: Negative for hearing loss.   Eyes: Negative for blurred vision and double vision.   Respiratory: Negative for shortness of breath and stridor.   Cardiovascular: Negative for chest pain and palpitations.   Gastrointestinal: Negative for nausea, vomiting and constipation.   Genitourinary: Negative for frequency. Negative for urgency.   Musculoskeletal: Negative for joint pain. Negative for myalgias and falls.   Skin: Negative for rash.   Neurological: +dizziness   Endo/Heme/Allergies: Does not bruise/bleed easily.   Psychiatric/Behavioral: Negative for memory loss. Negative for depression and " hallucinations. The patient is not nervous/anxious.      Past Medical History  Past Medical History:   Diagnosis Date    Anxiety     DEMENTIA     Depression     Hx of psychiatric care     Hypertension     Parkinson disease     Psychiatric problem     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    Seizures     Therapy     Transient loss of consciousness 9/28/2012    presumed seizures       Medications    Current Outpatient Prescriptions:     b complex vitamins tablet, Take 1 tablet by mouth once daily., Disp: , Rfl:     cyanocobalamin (VITAMIN B-12) 1000 MCG tablet, Take 100 mcg by mouth once daily.  , Disp: , Rfl:     diazePAM (VALIUM) 2 MG tablet, TAKE 1 TABLET BY MOUTH 3 TIMES A DAY AS NEEDED FOR ANXIETY, Disp: 90 tablet, Rfl: 5    folic acid (FOLVITE) 800 MCG Tab, Take 800 mcg by mouth once daily.  , Disp: , Rfl:     gabapentin (NEURONTIN) 300 MG capsule, Take 1 capsule (300 mg total) by mouth every evening., Disp: 30 capsule, Rfl: 11    levETIRAcetam (KEPPRA) 1000 MG tablet, Take 1 tablet (1,000 mg total) by mouth 2 (two) times daily., Disp: 60 tablet, Rfl: 6    lisinopril (PRINIVIL,ZESTRIL) 20 MG tablet, Take 1 tablet (20 mg total) by mouth once daily., Disp: 90 tablet, Rfl: 1    mirtazapine (REMERON) 30 MG tablet, Take 1 tablet (30 mg total) by mouth every evening., Disp: 30 tablet, Rfl: 1    Allergies  Review of patient's allergies indicates:  No Known Allergies    Social History  Social History     Social History    Marital status:      Spouse name: N/A    Number of children: N/A    Years of education: N/A     Occupational History    Not on file.     Social History Main Topics    Smoking status: Never Smoker    Smokeless tobacco: Never Used    Alcohol use 1.0 oz/week     2 Standard drinks or equivalent per week    Drug use: No    Sexual activity: Not Currently     Other Topics Concern    Not on file     Social History Narrative    Now lives at [facility], an assisted living.  "      Family History  No family history on file.    Physical Exam  BP (!) 185/110   Pulse 86   Ht 5' 5" (1.651 m)     General appearance: Well-developed, well-groomed.     Neurologic Exam: The patient is awake, alert and oriented. Oriented to person, place, year, month. Not oriented to date    Cranial nerves: pupils are round and reactive to light and accommodation. Visual fields are full to confrontation. Ocular motility is full in all cardinal positions of gaze. Facial sensation is normal to pinprick and light touch. Facial activation is symmetric. Hearing is normal bilaterally. Shoulder elevation is symmetric and full strength bilaterally. Tongue is midline and neck rotation strength is normal bilaterally. Neck range of motion is normal.     Motor examination of all extremities demonstrates normal bulk and tone in all four limbs. There are no atrophy or fasciculations. Strength is 5/5 in the upper extremities bilaterally. 4/5 proximally in the lower extremities bilaterally    Sensory examination is normal to pinprick, vibration and proprioception in the upper and lower extremities bilaterally.    Deep tendon reflexes are 3+ and symmetric in the upper and lower extremities bilaterally.     Gait: unsteady on standing up, good arm swing bilaterally, right leg lag, turn en bloc. Ataxic speech    Coordination: Finger to nose  - mild dysmetria bilaterally    Movement exam: intermittent resting tremor noted in the right hand. Mild increase in tone on the right. +mild bradykinesia.      Lab and Test Results    WBC   Date Value Ref Range Status   03/13/2018 3.43 (L) 3.90 - 12.70 K/uL Final   07/24/2017 4.54 3.90 - 12.70 K/uL Final   11/02/2016 2.68 (L) 3.90 - 12.70 K/uL Final     Hemoglobin   Date Value Ref Range Status   03/13/2018 11.3 (L) 12.0 - 16.0 g/dL Final   07/24/2017 11.9 (L) 12.0 - 16.0 g/dL Final   11/02/2016 12.4 12.0 - 16.0 g/dL Final     Hematocrit   Date Value Ref Range Status   03/13/2018 34.7 (L) 37.0 " - 48.5 % Final   07/24/2017 36.1 (L) 37.0 - 48.5 % Final   11/02/2016 37.1 37.0 - 48.5 % Final     Platelets   Date Value Ref Range Status   03/13/2018 172 150 - 350 K/uL Final   07/24/2017 220 150 - 350 K/uL Final   11/02/2016 210 150 - 350 K/uL Final     Glucose   Date Value Ref Range Status   03/13/2018 101 70 - 110 mg/dL Final   07/24/2017 103 70 - 110 mg/dL Final   11/02/2016 97 70 - 110 mg/dL Final     Sodium   Date Value Ref Range Status   03/13/2018 142 136 - 145 mmol/L Final   07/24/2017 144 136 - 145 mmol/L Final   11/02/2016 146 (H) 136 - 145 mmol/L Final     Potassium   Date Value Ref Range Status   03/13/2018 3.3 (L) 3.5 - 5.1 mmol/L Final   07/24/2017 3.1 (L) 3.5 - 5.1 mmol/L Final   11/02/2016 3.4 (L) 3.5 - 5.1 mmol/L Final     Chloride   Date Value Ref Range Status   03/13/2018 104 95 - 110 mmol/L Final   07/24/2017 103 95 - 110 mmol/L Final   11/02/2016 107 95 - 110 mmol/L Final     CO2   Date Value Ref Range Status   03/13/2018 27 23 - 29 mmol/L Final   07/24/2017 25 23 - 29 mmol/L Final   11/02/2016 24 23 - 29 mmol/L Final     BUN, Bld   Date Value Ref Range Status   03/13/2018 13 8 - 23 mg/dL Final   07/24/2017 10 8 - 23 mg/dL Final   11/02/2016 17 8 - 23 mg/dL Final     Creatinine   Date Value Ref Range Status   05/09/2018 0.7 0.5 - 1.4 mg/dL Final   03/13/2018 0.7 0.5 - 1.4 mg/dL Final   07/24/2017 0.8 0.5 - 1.4 mg/dL Final     Calcium   Date Value Ref Range Status   03/13/2018 9.3 8.7 - 10.5 mg/dL Final   07/24/2017 9.1 8.7 - 10.5 mg/dL Final   11/02/2016 9.6 8.7 - 10.5 mg/dL Final     Magnesium   Date Value Ref Range Status   02/09/2015 1.3 (L) 1.6 - 2.6 mg/dL Final   10/28/2012 2.7 (H) 1.6 - 2.6 mg/dl Final   10/27/2012 1.7 1.6 - 2.6 mg/dl Final     Phosphorus   Date Value Ref Range Status   10/28/2012 4.9 (H) 2.7 - 4.5 mg/dl Final   10/27/2012 5.0 (H) 2.7 - 4.5 mg/dl Final   10/26/2012 5.5 (H) 2.7 - 4.5 mg/dl Final     Alkaline Phosphatase   Date Value Ref Range Status   03/13/2018 66 55 -  135 U/L Final   07/24/2017 80 55 - 135 U/L Final   11/02/2016 58 55 - 135 U/L Final     ALT   Date Value Ref Range Status   03/13/2018 23 10 - 44 U/L Final   07/24/2017 19 10 - 44 U/L Final   11/02/2016 15 10 - 44 U/L Final     AST   Date Value Ref Range Status   03/13/2018 30 10 - 40 U/L Final   07/24/2017 29 10 - 40 U/L Final   11/02/2016 30 10 - 40 U/L Final       Images:     MRI brain WO contrast (2/8/15)  No evidence of acute infarction, parenchymal hemorrhage, or abnormal enhancement. Cerebral volume loss, greater than expected for chronological age but similar to the prior examination - primarily fronto-parietal area        Other tests:  7/18/11 Neuropsych testing:  SUMMARY AND RECOMMENDATIONS:   Neuropsychological testing is consistent with moderate dementia affecting visuospatial/constructional abilities, attention, naming, psychomotor speed, and frontal lobe abilities (verbal fluency, abstract reasoning, mental flexibility), with depression and anxiety. Memory functioning is also affected and varies from mildly to severely impaired on immediate recall and from low average to mildly impaired on delayed recall. Thus there is prominent impairment in frontal lobe abilities in addition to impairment in most other cognitive abilities.     Assessment and Plan    Breonna Silva is a 63 y.o. female with PMHx of depression, chronic pain, dementia, seizure disorder with some features of parkinsonism but also ataxic speech noted on exam today. History of dopamine intolerance in the past.   Will check for systemic causes of ataxia and also obtain brain imaging.     Plan:  Problem List Items Addressed This Visit     Dementia - Primary (Chronic)    Relevant Orders    MRI Brain W WO Contrast    Creatinine, serum (Completed)    Secondary parkinsonism    Relevant Orders    Ambulatory Referral to Physical/Occupational Therapy      Other Visit Diagnoses     Ataxia        Relevant Orders    MRI Brain W WO Contrast    Gliadin  IGG & IGA Antibodies, Deaminated (Completed)    THYROID PEROXIDASE ANTIBODY (Completed)    THYROGLOBULIN AB SCREEN (Completed)    GLUTAMIC ACID DECARBOXYLASE    Paraneoplastic Autoantibody Evaulation, Serum    T4 (Completed)    TSH (Completed)    VITAMIN E    VITAMIN B1    AMMONIA (Completed)    Ambulatory Referral to Physical/Occupational Therapy        Follow up in movement clinic in 3 months    Patient seen and evaluated by Dr. Abimael Martini  Neurology Resident - PGY 3  Department of Neurology  [address] [place] [id number]

## 2018-05-15 LAB
ACHR BIND AB SER-SCNC: 0 NMOL/L
AMPHIPHYSIN AB TITR SER: NEGATIVE TITER
CV2 IGG TITR SER: NEGATIVE TITER
GLIAL NUC TYPE 1 AB TITR SER: NEGATIVE TITER
HU1 AB TITR SER: NEGATIVE TITER
HU2 AB TITR SER IF: NEGATIVE TITER
HU3 AB TITR SER: NEGATIVE TITER
IMMUNOLOGIST REVIEW: NORMAL
NACHR AB SER-SCNC: 0 NMOL/L
PAVAL REFLEX TEST ADDED: NORMAL
PCA-1 AB TITR SER: NEGATIVE TITER
PCA-2 AB TITR SER: NEGATIVE TITER
PCA-TR AB TITR SER: NEGATIVE TITER
STRIA MUS AB TITR SER: NEGATIVE TITER
VGCC-N BIND AB SER-SCNC: 0 NMOL/L
VGCC-P/Q BIND AB SER-SCNC: 0 NMOL/L
VGKC AB SER-SCNC: 0 NMOL/L

## 2018-05-16 NOTE — PROGRESS NOTES
Patient seen and examined.  I agree with the history, exam, assessment and plan within the resident's note.            Jakob Varghese MD, MPH  Division of Movement and Memory Disorders  [facility]

## 2018-05-18 ENCOUNTER — TELEPHONE (OUTPATIENT)
Dept: NEUROLOGY | Facility: CLINIC | Age: 63
End: 2018-05-18

## 2018-05-18 ENCOUNTER — NURSE TRIAGE (OUTPATIENT)
Dept: ADMINISTRATIVE | Facility: CLINIC | Age: 63
End: 2018-05-18

## 2018-05-18 RX ORDER — MECLIZINE HYDROCHLORIDE 25 MG/1
25 TABLET ORAL 3 TIMES DAILY PRN
Qty: 30 TABLET | Refills: 0 | Status: SHIPPED | OUTPATIENT
Start: 2018-05-18 | End: 2018-05-30 | Stop reason: SDUPTHER

## 2018-05-18 NOTE — TELEPHONE ENCOUNTER
Reason for Disposition   [1] Caller requesting NON-URGENT health information AND [2] PCP's office is the best resource    Protocols used: ST INFORMATION ONLY CALL-A-AH    Pt is very upset that no one from neuro office is calling her with results. States they told her someone called and left message but she denies getting any missed call or messages. Pt states she has been trying to call all week and unable to get through to office. Pt states she called patient relations and they did nothing for her. Pt would like someone to call her, [phone], ASAP with results. Care advice given.

## 2018-05-18 NOTE — TELEPHONE ENCOUNTER
Left a message for the patient to let her know her MRI appointment was rescheduled to Monday 5/21 at 9:30. The patient was asked to call back to reschedule if this is not a good day and time for her.

## 2018-05-18 NOTE — TELEPHONE ENCOUNTER
----- Message from Ekaterina Mayers sent at 5/15/2018  3:02 PM CDT -----  Contact: pt @ [phone]  Calling to speak with someone in Dr. Paul's office regarding her lab results. Please call.

## 2018-05-18 NOTE — TELEPHONE ENCOUNTER
----- Message from Michelet Chaudhary sent at 5/18/2018  9:21 AM CDT -----  Test Results     Who Called: Pt   Name of Test (Lab/Mammo/Etc): NON FASTING LAB   Date of Test: 05/09/18   Ordering Provider: Dr. Paul   Where the test was performed: [facility]   Communication Preference ([employer] response to Pt. (or) Call Back # and timeframe): [phone]   Additional Information: Pt is requesting to speak w/ the doctor today regarding her test results. Pls call.

## 2018-05-22 ENCOUNTER — DOCUMENTATION ONLY (OUTPATIENT)
Dept: REHABILITATION | Facility: HOSPITAL | Age: 63
End: 2018-05-22

## 2018-05-22 NOTE — PROGRESS NOTES
Pt did not attend today's scheduled PT evaluation. Per central scheduling notes, only a voicemail was left for pt, did not receive confirmation of appointment.       Arabella Cruz, DPT  5/22/2018

## 2018-05-28 RX ORDER — MIRTAZAPINE 30 MG/1
30 TABLET, FILM COATED ORAL NIGHTLY
Qty: 30 TABLET | Refills: 0 | Status: SHIPPED | OUTPATIENT
Start: 2018-05-28 | End: 2018-08-28

## 2018-05-30 RX ORDER — MECLIZINE HYDROCHLORIDE 25 MG/1
25 TABLET ORAL 3 TIMES DAILY PRN
Qty: 30 TABLET | Refills: 0 | Status: SHIPPED | OUTPATIENT
Start: 2018-05-30 | End: 2018-06-17 | Stop reason: SDUPTHER

## 2018-05-31 ENCOUNTER — PATIENT OUTREACH (OUTPATIENT)
Dept: ADMINISTRATIVE | Facility: HOSPITAL | Age: 63
End: 2018-05-31

## 2018-05-31 NOTE — PROGRESS NOTES
unsuccessful  Patient reach out attempt to schedule mammogram. Will mail letter to reach patient.

## 2018-06-18 RX ORDER — MECLIZINE HYDROCHLORIDE 25 MG/1
25 TABLET ORAL 3 TIMES DAILY PRN
Qty: 30 TABLET | Refills: 0 | Status: SHIPPED | OUTPATIENT
Start: 2018-06-18 | End: 2018-07-05 | Stop reason: SDUPTHER

## 2018-06-25 ENCOUNTER — TELEPHONE (OUTPATIENT)
Dept: NEUROLOGY | Facility: CLINIC | Age: 63
End: 2018-06-25

## 2018-06-28 RX ORDER — MIRTAZAPINE 30 MG/1
30 TABLET, FILM COATED ORAL NIGHTLY
Qty: 30 TABLET | Refills: 0 | OUTPATIENT
Start: 2018-06-28

## 2018-07-01 ENCOUNTER — NURSE TRIAGE (OUTPATIENT)
Dept: ADMINISTRATIVE | Facility: CLINIC | Age: 63
End: 2018-07-01

## 2018-07-02 ENCOUNTER — OFFICE VISIT (OUTPATIENT)
Dept: INTERNAL MEDICINE | Facility: CLINIC | Age: 63
End: 2018-07-02
Payer: MEDICARE

## 2018-07-02 VITALS — HEIGHT: 65 IN | DIASTOLIC BLOOD PRESSURE: 80 MMHG | SYSTOLIC BLOOD PRESSURE: 132 MMHG | HEART RATE: 114 BPM

## 2018-07-02 DIAGNOSIS — I10 ESSENTIAL HYPERTENSION, BENIGN: Chronic | ICD-10-CM

## 2018-07-02 DIAGNOSIS — F41.9 ANXIETY: ICD-10-CM

## 2018-07-02 DIAGNOSIS — G56.41 COMPLEX REGIONAL PAIN SYNDROME TYPE 2 OF RIGHT UPPER EXTREMITY: ICD-10-CM

## 2018-07-02 DIAGNOSIS — F02.80 DEMENTIA DUE TO PARKINSON'S DISEASE WITHOUT BEHAVIORAL DISTURBANCE: Chronic | ICD-10-CM

## 2018-07-02 DIAGNOSIS — M54.12 CERVICAL RADICULOPATHY: ICD-10-CM

## 2018-07-02 DIAGNOSIS — G20.A1 DEMENTIA DUE TO PARKINSON'S DISEASE WITHOUT BEHAVIORAL DISTURBANCE: Chronic | ICD-10-CM

## 2018-07-02 DIAGNOSIS — G21.9 SECONDARY PARKINSONISM, UNSPECIFIED SECONDARY PARKINSONISM TYPE: Primary | ICD-10-CM

## 2018-07-02 PROCEDURE — 99999 PR PBB SHADOW E&M-EST. PATIENT-LVL III: CPT | Mod: PBBFAC,,, | Performed by: INTERNAL MEDICINE

## 2018-07-02 PROCEDURE — 99214 OFFICE O/P EST MOD 30 MIN: CPT | Mod: S$GLB,,, | Performed by: INTERNAL MEDICINE

## 2018-07-02 PROCEDURE — 3079F DIAST BP 80-89 MM HG: CPT | Mod: CPTII,S$GLB,, | Performed by: INTERNAL MEDICINE

## 2018-07-02 PROCEDURE — 3075F SYST BP GE 130 - 139MM HG: CPT | Mod: CPTII,S$GLB,, | Performed by: INTERNAL MEDICINE

## 2018-07-02 RX ORDER — DIAZEPAM 2 MG/1
TABLET ORAL
Qty: 90 TABLET | Refills: 5 | Status: SHIPPED | OUTPATIENT
Start: 2018-07-02 | End: 2019-01-09 | Stop reason: SDUPTHER

## 2018-07-02 RX ORDER — DIAZEPAM 2 MG/1
TABLET ORAL
Qty: 90 TABLET | Refills: 5 | Status: CANCELLED | OUTPATIENT
Start: 2018-07-02

## 2018-07-02 NOTE — PROGRESS NOTES
Subjective:       Patient ID: Breonna Silva is a 63 y.o. female.    Chief Complaint: Hypertension    Pt here for f/u. She has parkinson's but has not seen neuro recently. She has trouble remembering things and has a known dementia syndrome with suspect FTD. She est care with a new neurologist. She was to have an MRI of her brain but has not yet done this. She is living at assisted living facility where she says she administers her own medication.     She has dx Sz disorder. However, sz history is questionable and its relation to etoh intake remains possible. She takes keppra. No sz recently.      She reports depression. She has been on cymbalta but didn't find effective so stopped. She was most recently on lexapro but finds it made her sleep walk so stopped. She saw psychiatry and she is now on remeron. No SI/HI.     She has chronic pain in R arm related to cervical radiculopathy. She has seen pain mgmt. She had injection which did help. She would like to consider again. She is on gabapentin but not sure this helps her.     Pt's BP is well controlled. Tolerating meds well. Pt denies cp/sob/ha/vision or neuro changes. Not checking at home.       Hypertension   Pertinent negatives include no chest pain, headaches or shortness of breath.     Review of Systems   Constitutional: Negative for fatigue and unexpected weight change.   Eyes: Negative for visual disturbance.   Respiratory: Negative for shortness of breath.    Cardiovascular: Negative for chest pain.   Gastrointestinal: Negative for abdominal pain and blood in stool.   Neurological: Positive for dizziness. Negative for weakness and headaches.   Psychiatric/Behavioral: Positive for dysphoric mood.       Objective:      Physical Exam   Constitutional: She is oriented to person, place, and time. She appears well-developed and well-nourished.   Eyes: EOM are normal. Pupils are equal, round, and reactive to light.   Neck: Neck supple. No thyromegaly present.    Cardiovascular: Normal rate, regular rhythm and normal heart sounds.    Pulmonary/Chest: Effort normal and breath sounds normal.   Musculoskeletal: She exhibits no edema.   Lymphadenopathy:     She has no cervical adenopathy.   Neurological: She is alert and oriented to person, place, and time. No cranial nerve deficit.   Psychiatric: Her affect is blunt. Her speech is delayed. She is slowed. Thought content is not paranoid. She expresses no suicidal ideation. She exhibits abnormal recent memory.       Assessment:       1. Secondary parkinsonism, unspecified secondary Parkinsonism type    2. Dementia due to Parkinson's disease without behavioral disturbance    3. Complex regional pain syndrome type 2 of right upper extremity    4. Cervical radiculopathy    5. Anxiety    6. Essential hypertension, benign        Plan:       1. Keep f/u with specialists including psychiatry and neuro and pain mgmt--assisted with appts  2. Continue current meds  3. Pt assisted in getting appt for MRI brain ordered by neuro

## 2018-07-02 NOTE — TELEPHONE ENCOUNTER
Reason for Disposition   General information question, no triage required and triager able to answer question    Protocols used: ST INFORMATION ONLY CALL-A-AH    Patient called to verify her appt for tomorrow. Information provided.

## 2018-07-05 RX ORDER — MECLIZINE HYDROCHLORIDE 25 MG/1
25 TABLET ORAL 3 TIMES DAILY PRN
Qty: 30 TABLET | Refills: 0 | Status: SHIPPED | OUTPATIENT
Start: 2018-07-05 | End: 2018-07-21 | Stop reason: SDUPTHER

## 2018-07-05 RX ORDER — MIRTAZAPINE 30 MG/1
30 TABLET, FILM COATED ORAL NIGHTLY
Qty: 30 TABLET | Refills: 0 | OUTPATIENT
Start: 2018-07-05

## 2018-07-09 ENCOUNTER — TELEPHONE (OUTPATIENT)
Dept: INTERNAL MEDICINE | Facility: CLINIC | Age: 63
End: 2018-07-09

## 2018-07-09 RX ORDER — MIRTAZAPINE 30 MG/1
30 TABLET, FILM COATED ORAL NIGHTLY
Qty: 30 TABLET | Refills: 0 | OUTPATIENT
Start: 2018-07-09

## 2018-07-09 NOTE — TELEPHONE ENCOUNTER
----- Message from Sarahi Romero sent at 7/9/2018 10:47 AM CDT -----  Contact: CHANTEL GOMEZ [[id number]]            Name of Who is Calling: CHANTEL GOMEZ [[id number]]    What is the request in detail: Pt is calling to discuss a side affect from a medication.  Pt says that she's been having bad diarrhea.  Pt says that she have not taken anything to help with the diarrhea and would like to know what will help with the cramping and diarrhea.  Please contact pt to further discuss and advise.      Can the clinic reply by [facility]: No      What Number to Call Back if not in [facility]: [phone]

## 2018-07-09 NOTE — TELEPHONE ENCOUNTER
Pt advised that she's experiencing severe diarrhea x 24 hrs. Pt advised that she ate pizza with pepperoni and shrimp on 07/08 and nothing else. Pt advised that she spoke to the pharmacy and was advised to take Pepto bismol but have not tried anything OTC.  . Please advise/authorize? CF

## 2018-07-10 ENCOUNTER — HOSPITAL ENCOUNTER (OUTPATIENT)
Dept: RADIOLOGY | Facility: OTHER | Age: 63
Discharge: HOME | End: 2018-07-10
Attending: PSYCHIATRY & NEUROLOGY
Payer: MEDICARE

## 2018-07-10 DIAGNOSIS — R27.0 ATAXIA: ICD-10-CM

## 2018-07-10 DIAGNOSIS — F03.90 DEMENTIA WITHOUT BEHAVIORAL DISTURBANCE, UNSPECIFIED DEMENTIA TYPE: Chronic | ICD-10-CM

## 2018-07-10 PROCEDURE — A9585 GADOBUTROL INJECTION: HCPCS | Performed by: PSYCHIATRY & NEUROLOGY

## 2018-07-10 PROCEDURE — 25500020 PHARM REV CODE 255: Performed by: PSYCHIATRY & NEUROLOGY

## 2018-07-10 PROCEDURE — 70553 MRI BRAIN STEM W/O & W/DYE: CPT | Mod: 26,,, | Performed by: RADIOLOGY

## 2018-07-10 PROCEDURE — 70553 MRI BRAIN STEM W/O & W/DYE: CPT | Mod: TC

## 2018-07-10 RX ORDER — GADOBUTROL 604.72 MG/ML
6 INJECTION INTRAVENOUS
Status: COMPLETED | OUTPATIENT
Start: 2018-07-10 | End: 2018-07-10

## 2018-07-10 RX ADMIN — GADOBUTROL 6 ML: 604.72 INJECTION INTRAVENOUS at 02:07

## 2018-07-11 ENCOUNTER — TELEPHONE (OUTPATIENT)
Dept: NEUROLOGY | Facility: CLINIC | Age: 63
End: 2018-07-11

## 2018-07-11 NOTE — TELEPHONE ENCOUNTER
Left message for patient to contact our office to discuss rescheduling appointment of 7-19 as she is a current patient of .

## 2018-07-17 ENCOUNTER — TELEPHONE (OUTPATIENT)
Dept: PAIN MEDICINE | Facility: CLINIC | Age: 63
End: 2018-07-17

## 2018-07-17 ENCOUNTER — TELEPHONE (OUTPATIENT)
Dept: NEUROLOGY | Facility: CLINIC | Age: 63
End: 2018-07-17

## 2018-07-17 NOTE — TELEPHONE ENCOUNTER
Left message for patient to contact our office to discuss rescheduling appointment of 7-19-18 as she is a patient of .

## 2018-07-17 NOTE — TELEPHONE ENCOUNTER
Left voice message asking for are turn call to reschedule her appointment on 7-19-18 at 3:30pm with Dr. Atkins who will be in surgery.

## 2018-07-18 ENCOUNTER — PATIENT OUTREACH (OUTPATIENT)
Dept: ADMINISTRATIVE | Facility: HOSPITAL | Age: 63
End: 2018-07-18

## 2018-07-18 NOTE — PROGRESS NOTES
Dear Breonna Silva:    Your [facility] is dedicated to helping you stay healthy with regular scheduled recommended screenings.  Scheduling routine screenings is important to maintaining good health. Our records indicate that you may be overdue for your screening pap smear.  Pap smear screening can help identify patients at risk for developing cervical cancer at an early stage, when it is most likely to be successfully treated.    The current recommendation for Pap smear screening is every 3-5 years.  We encourage you to schedule your appointment with your [facility] provider.   Many women see a Gynecologist for this screening but some primary care providers also provide Pap screening  If you recently had your pap smear screening performed outside of [facility], please let your Health care team know so that they can update your health record.  You may send a message via your [facility] account to update this requested information to your Care touch coordinator.     If you have questions or want to schedule your screening elsewhere, please call [phone] to speak to a CareTouch coordinator.

## 2018-07-18 NOTE — PROGRESS NOTES
Dear Breonna Silva,        Our records indicate that you are due for a mammogram.    In the [place], one in nine women will develop breast cancer during their lifetime. While there is no way to prevent breast cancer, early detection provides the best opportunity for curing it.    For women over the age of 40, the American Cancer Society recommends a yearly clinical breast exam and a yearly mammogram. These practices have saved thousands of lives. We need your help to ensure that you are receiving optimal medical care.    Please make an appointment for a mammogram at your earliest convenience.    Sincerely,    SELMA Zelaya  Clinical Care Coordinator  Internal Medicine  [facility]/[place]

## 2018-07-20 ENCOUNTER — TELEPHONE (OUTPATIENT)
Dept: NEUROLOGY | Facility: CLINIC | Age: 63
End: 2018-07-20

## 2018-07-20 NOTE — TELEPHONE ENCOUNTER
Returned call to patient to discuss appointment of 7-19-18. Requested her to return my call to answer all questions.

## 2018-07-22 RX ORDER — MECLIZINE HYDROCHLORIDE 25 MG/1
25 TABLET ORAL 3 TIMES DAILY PRN
Qty: 30 TABLET | Refills: 0 | Status: SHIPPED | OUTPATIENT
Start: 2018-07-22 | End: 2019-03-26 | Stop reason: SDUPTHER

## 2018-07-26 ENCOUNTER — TELEPHONE (OUTPATIENT)
Dept: PAIN MEDICINE | Facility: CLINIC | Age: 63
End: 2018-07-26

## 2018-07-26 NOTE — TELEPHONE ENCOUNTER
----- Message from Inge Giron sent at 7/26/2018  9:16 AM CDT -----  Contact: pt            Name of Who is Calling: pt      What is the request in detail: pt is requesting a consult for right arm pain on 8/1/18. She cannot drive. Nothing available with that date. Please call pt      Can the clinic reply by [facility]: no      What Number to Call Back if not in [facility]: [phone]

## 2018-07-26 NOTE — TELEPHONE ENCOUNTER
Staff returned patient call regarding her message, there was no answer, she was informed on voicemail that Dr. Atkins doesn't have any availability at this time on August 1st, she can be added to the waiting list and scheduled for his next available appointment.

## 2018-08-01 ENCOUNTER — OFFICE VISIT (OUTPATIENT)
Dept: NEUROLOGY | Facility: CLINIC | Age: 63
End: 2018-08-01
Payer: MEDICARE

## 2018-08-01 VITALS
HEART RATE: 78 BPM | SYSTOLIC BLOOD PRESSURE: 164 MMHG | BODY MASS INDEX: 23.95 KG/M2 | HEIGHT: 65 IN | DIASTOLIC BLOOD PRESSURE: 87 MMHG | WEIGHT: 143.75 LBS

## 2018-08-01 DIAGNOSIS — I10 ESSENTIAL HYPERTENSION, BENIGN: Chronic | ICD-10-CM

## 2018-08-01 DIAGNOSIS — F06.34 MOOD DISORDER WITH MIXED FEATURES DUE TO GENERAL MEDICAL CONDITION: ICD-10-CM

## 2018-08-01 DIAGNOSIS — R41.89 OTHER SYMPTOMS AND SIGNS INVOLVING COGNITIVE FUNCTIONS AND AWARENESS: Primary | ICD-10-CM

## 2018-08-01 DIAGNOSIS — F41.9 ANXIETY: ICD-10-CM

## 2018-08-01 DIAGNOSIS — R25.1 TREMOR, UNSPECIFIED: ICD-10-CM

## 2018-08-01 PROCEDURE — 3077F SYST BP >= 140 MM HG: CPT | Mod: CPTII,S$GLB,, | Performed by: PSYCHIATRY & NEUROLOGY

## 2018-08-01 PROCEDURE — 99999 PR PBB SHADOW E&M-EST. PATIENT-LVL III: CPT | Mod: PBBFAC,,, | Performed by: PSYCHIATRY & NEUROLOGY

## 2018-08-01 PROCEDURE — 3008F BODY MASS INDEX DOCD: CPT | Mod: CPTII,S$GLB,, | Performed by: PSYCHIATRY & NEUROLOGY

## 2018-08-01 PROCEDURE — 99215 OFFICE O/P EST HI 40 MIN: CPT | Mod: S$GLB,,, | Performed by: PSYCHIATRY & NEUROLOGY

## 2018-08-01 PROCEDURE — 3079F DIAST BP 80-89 MM HG: CPT | Mod: CPTII,S$GLB,, | Performed by: PSYCHIATRY & NEUROLOGY

## 2018-08-01 NOTE — PATIENT INSTRUCTIONS
Discussed with patient.  She is convinced that she has Parkinson's disease and would like to go back to the clinic to see Dr. Vraghese.  Her symptoms of forgetfulness and attention may be a combination a mild dementia and significant psychiatric problems as she does have a history of posttraumatic stress disorder.  She continues to live in assisted living center with no significant change in her ADLs since 2013.  She is advised that she does need to continue follow-up with Psychiatry.  She is advised to keep her follow-up appointment with Dr. Varghese.

## 2018-08-01 NOTE — PROGRESS NOTES
Subjective:       Patient ID: Breonna Silva is a 63 y.o. female.    Chief Complaint:  Memory Loss and Dizziness      History of Present Illness  HPI   This is a 63-year-old female who presents with complaints of shaking episodes..  The patient has never been seen by me.  It is of note that she had been seen by Dr. Mccray in the past for secondary Parkinson's and subsequently by Dr. Varghese in May 2018 for memory difficulties.  She was supposed to follow by Neurology however instead was given an appointment to see me.  She denies any memory difficulties but does admit to occasional forgetfulness.  She comes to the clinic alone and is presently residing at an assisted living facility reporting that she does not like the facility very much.  She also has a [facility] psychiatric history and presently is being followed by Dr. Santos.  However has kept appointments as she reports she had she did not like her present psychiatrist.    The patient is able to give an adequate history however there is a loss of time since and to give conflicting history.  She has multiple complaints including dizziness forgetfulness episodic shaking sleep difficulties and feeling anxious and depressed.  She also reports that she has had seizures in the past however no clear documentation of seizures.  She reports that she has been on Keppra that has been helping.  She also has history of alcohol abuse and reports that she has continued to drink intermittently as it helps her shaking.  She reports that she has no family here since the death of her  several years ago.  She is able to take public transportation or a cap on her own.  She has no difficulty taking care of her day-to-day needs at this [facility], including personal hygiene and managing her own medications.  A recent MRI scan done was reviewed.    The patient had been seen by Dr. Mccray in 2011 for shaking episodes and intermittent memory difficulties.  Neuropsychological  Evaluation on an outpatient basis ordered to help clarify her diagnosis. The patient reported memory problems that she thinks began after a number of falls which occurred over the past couple of years. Her general cognitive abilities as assessed by the [facility] were in the severely impaired range, with severely impaired immediate verbal memory and visuospatial/constructional abilities, moderately impaired language, and mildly impaired attention and delayed memory. Further assessment of specific cognitive abilities revealed no deficits in temporal orientation and facial recognition but naming, verbal fluency, constructional ability, abstract reasoning, psychomotor speed, and mental flexibility were impaired. Additional memory assessment revealed mildly impaired immediate memory and low average delayed memory. Personality test data suggested the presence of moderate depression and severe anxiety. Neuropsychological testing is consistent with moderate dementia affecting visuospatial/constructional abilities, attention, naming, psychomotor speed, and frontal lobe abilities (verbal fluency, abstract reasoning, mental flexibility), with depression and anxiety. Memory functioning is also affected and varies from mildly to severely impaired on immediate recall and from low average to mildly impaired on delayed recall. Thus there is prominent impairment in frontal lobe abilities in addition to impairment in most other cognitive abilities.       Review of Systems  Review of Systems   Constitutional: Negative.    HENT: Negative.  Negative for hearing loss.    Eyes: Negative.  Negative for visual disturbance.   Respiratory: Negative.  Negative for shortness of breath.    Cardiovascular: Negative.  Negative for chest pain and palpitations.   Gastrointestinal: Negative.    Genitourinary: Negative.    Musculoskeletal: Negative.  Negative for back pain, gait problem and neck pain.   Skin: Negative.    Neurological: Positive for  dizziness and tremors. Negative for seizures, syncope, speech difficulty, weakness, numbness and headaches.   Psychiatric/Behavioral: Positive for decreased concentration and sleep disturbance. Negative for confusion. The patient is nervous/anxious.        Objective:      Neurologic Exam     Cranial Nerves     CN III, IV, VI   Pupils are equal, round, and reactive to light.  Extraocular motions are normal.     Motor Exam     Strength   Strength 5/5 throughout.       Physical Exam   Constitutional: She appears well-developed and well-nourished.   HENT:   Head: Normocephalic and atraumatic.   Right Ear: Hearing normal.   Left Ear: Hearing normal.   Eyes: EOM are normal. Pupils are equal, round, and reactive to light.   Fundus examination showed sharp disc margins.   Neck: Normal range of motion. Neck supple. Carotid bruit is not present.   Neurological: She is alert. She has normal strength and normal reflexes. She displays no atrophy and no tremor (No tremor was noted during this examination). No cranial nerve deficit (Visual fields at bedside testing essentially normal.  No facial asymmetry noted with facial movements and sensory exam being normal/symmetrical.  Corneals/gag reflexes normal.  Tongue & palate movements normal.  Shoulder shrug was normal.) or sensory deficit (Primary sensations are intact). She exhibits normal muscle tone. She displays a negative Romberg sign. Coordination and gait (Her gait is stable with no bradykinesia or dyskinesia and no difficulty with turns.) normal. She displays no Babinski's sign on the right side. She displays no Babinski's sign on the left side.     Mental status examination:  The patient is oriented to place and person grossly to time.  She is able to give an adequate recent history though had some loss of detail and time sense.  Spelling backwards 3/5.  Immediate recall 1-2/5 in 2 min.  Her speech is somewhat pressured and tangential.  Affect is appropriate, mood was  anxious.  No thought disorder is noted.   Vitals reviewed.        Assessment:        1. Other symptoms and signs involving cognitive functions and awareness    2. Tremor, unspecified    3. Essential hypertension, benign    4. 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 Anxiety            Plan:       Discussed with patient.  She is convinced that she has Parkinson's disease and would like to go back to the clinic to see Dr. Varghese.  Her symptoms of forgetfulness and attention may be a combination a mild dementia and significant psychiatric problems as she does have a history of posttraumatic stress disorder.  She continues to live in assisted living center with no significant change in her ADLs since 2013.  She is advised that she does need to continue follow-up with Psychiatry.  She is advised to keep her follow-up appointment with Dr. Varghese.

## 2018-08-02 ENCOUNTER — TELEPHONE (OUTPATIENT)
Dept: NEUROLOGY | Facility: CLINIC | Age: 63
End: 2018-08-02

## 2018-08-02 NOTE — TELEPHONE ENCOUNTER
----- Message from Ekaterina Mayers sent at 8/2/2018 10:08 AM CDT -----  Contact: pt @ [phone]  Calling to schedule an appt with Dr. Varghese, patient is referred by Dr. Seaman. Asking that the patient please be seen sooner than 11/26 which is the next available appt in the system.

## 2018-08-06 ENCOUNTER — TELEPHONE (OUTPATIENT)
Dept: NEUROLOGY | Facility: CLINIC | Age: 63
End: 2018-08-06

## 2018-08-06 NOTE — TELEPHONE ENCOUNTER
Lm with 's to contact patient for scheduling with . Phone number provided to patient also to contact there office.

## 2018-08-06 NOTE — TELEPHONE ENCOUNTER
----- Message from Naima Nobles sent at 8/6/2018 10:00 AM CDT -----  Contact: pt            Name of Who is Calling: Breonna      What is the request in detail: requesting a call back in regards to getting an appt scheduled with Dr beaulieu. Please call and advise      Can the clinic reply by [facility]:no      What Number to Call Back if not in [facility]: [phone]

## 2018-08-06 NOTE — TELEPHONE ENCOUNTER
----- Message from Naima Carvajal sent at 8/6/2018  9:16 AM CDT -----  Contact: Breonna  Name of Who is Calling: Breonna      What is the request in detail: Patient was returning a missed call back from the staff       Can the clinic reply by [facility]: no      What Number [facility]: [phone]

## 2018-08-28 ENCOUNTER — OFFICE VISIT (OUTPATIENT)
Dept: NEUROLOGY | Facility: CLINIC | Age: 63
End: 2018-08-28
Payer: MEDICARE

## 2018-08-28 VITALS — DIASTOLIC BLOOD PRESSURE: 96 MMHG | SYSTOLIC BLOOD PRESSURE: 153 MMHG | HEART RATE: 73 BPM

## 2018-08-28 DIAGNOSIS — F02.818 LATE ONSET ALZHEIMER'S DISEASE WITH BEHAVIORAL DISTURBANCE: ICD-10-CM

## 2018-08-28 DIAGNOSIS — R25.1 TREMOR: ICD-10-CM

## 2018-08-28 DIAGNOSIS — G47.00 INSOMNIA, UNSPECIFIED TYPE: ICD-10-CM

## 2018-08-28 DIAGNOSIS — F03.90 DEMENTIA WITHOUT BEHAVIORAL DISTURBANCE, UNSPECIFIED DEMENTIA TYPE: Primary | ICD-10-CM

## 2018-08-28 DIAGNOSIS — G30.1 LATE ONSET ALZHEIMER'S DISEASE WITH BEHAVIORAL DISTURBANCE: ICD-10-CM

## 2018-08-28 PROCEDURE — 3080F DIAST BP >= 90 MM HG: CPT | Mod: CPTII,GC,S$GLB, | Performed by: STUDENT IN AN ORGANIZED HEALTH CARE EDUCATION/TRAINING PROGRAM

## 2018-08-28 PROCEDURE — 3077F SYST BP >= 140 MM HG: CPT | Mod: CPTII,GC,S$GLB, | Performed by: STUDENT IN AN ORGANIZED HEALTH CARE EDUCATION/TRAINING PROGRAM

## 2018-08-28 PROCEDURE — 99215 OFFICE O/P EST HI 40 MIN: CPT | Mod: GC,S$GLB,, | Performed by: STUDENT IN AN ORGANIZED HEALTH CARE EDUCATION/TRAINING PROGRAM

## 2018-08-28 PROCEDURE — 99999 PR PBB SHADOW E&M-EST. PATIENT-LVL III: CPT | Mod: PBBFAC,GC,, | Performed by: STUDENT IN AN ORGANIZED HEALTH CARE EDUCATION/TRAINING PROGRAM

## 2018-08-28 RX ORDER — PRIMIDONE 50 MG/1
TABLET ORAL
Qty: 30 TABLET | Refills: 2 | Status: SHIPPED | OUTPATIENT
Start: 2018-08-28 | End: 2018-11-13

## 2018-08-28 RX ORDER — MIRTAZAPINE 30 MG/1
TABLET, FILM COATED ORAL
Qty: 30 TABLET | Refills: 0
Start: 2018-08-28 | End: 2018-11-13

## 2018-08-28 NOTE — PROGRESS NOTES
"Neurology Clinic  Initial Consult    Patient Name: Breonna Silva  MRN: [id number]    CC: dementia, parkinson's    HPI: Breonna Silva is a 63 y.o. female w/ PMH significant for depression/anxiety, dementia, seizures, parkinson's disease presenting for new evaluation.  Pt has previously been seen by Lindsay Collazo, and Jose.      She is a poor historian, displays tangential thought processes.  At times, she is difficult to redirect.  She reports a history of 3.5 years of difficulty with ambulation.  Describes difficulty getting up, notes she bumps into walls when going to the bathroom.  With regards to dizziness, she notes she fell getting out of bed 2-4 months ago and hit her nose.  She is worried about breaking antiques in her living space.  Doesn't drive secondary to dizziness.    Describes a history of seizures, for which she takes keppra.  States last seizure was 1 week ago.  Describes seizures as shaking b/l, during which she maintains awareness.  Denies urinary/fecal incontinence, tongue biting.    States she feels like the "baddest bitch" at times.  States she feels as though she can say anything she wants because she is older than 60, but doesn't want to be that way.  States her memory is "fine."    She lives in an assisted living facility.  Initially [facility] name [facility] but I believe the correct [facility] (internet search).    Previously worked as a  and taught physical fitness.  Drinks 1 glass of vodka per day, states she wishes she could drink a bottle a day.  No cigarettes, recreational drug use.  Currently taking valium TID for her RUE, states she can't move it like she used to, which she attributes to stiffness.  Ongoing x3.5 years.  In general, R>L.      FMH notable for mother who  of breast cancer at 31 yo.  No other family members with known history of similar dementia/shaking difficulties.      Thinks about suicide "all the time" " "because she is so tired being "dizzy", "sick", and "needing help to walk."  States she would like to drink a bottle of drano and 20/20.  Asks if this would be painless, states she would like a method that is painless.  Doesn't use a cane, although she has one.  During interview, states she will not hurt herself in the future, does not have current intention to pursue self-harm.      During her last evaluation with Dr. Martini, where workup revealed elevated antigliadin Ig.  Pt states she eats a lot of pasta, but experiences a significant amount of bloating with pasta.        Review of Systems:  General: No fevers, chills  Eyes: No changes in vision  ENT: No changes in hearing  Respiratory: No SOB  CV: No chest pain, palpitations  GI: No diarrhea, blood in stool  Urinary: No dysuria, hematuria  Skin: No rashes  Neurological: +weakness, confusion.   Psychiatric: No auditory nor visual hallucinations      Past Medical History  Past Medical History:   Diagnosis Date    Anxiety     Dementia     Depression     Hx of psychiatric care     Hypertension     Parkinson disease     Psychiatric problem     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    Seizures     Therapy     Transient loss of consciousness 9/28/2012    presumed seizures       Medications    Current Outpatient Medications:     b complex vitamins tablet, Take 1 tablet by mouth once daily., Disp: , Rfl:     cyanocobalamin (VITAMIN B-12) 1000 MCG tablet, Take 100 mcg by mouth once daily.  , Disp: , Rfl:     diazePAM (VALIUM) 2 MG tablet, TAKE 1 TABLET BY MOUTH 3 TIMES A DAY AS NEEDED FOR ANXIETY, Disp: 90 tablet, Rfl: 5    folic acid (FOLVITE) 800 MCG Tab, Take 800 mcg by mouth once daily.  , Disp: , Rfl:     levETIRAcetam (KEPPRA) 1000 MG tablet, Take 1 tablet (1,000 mg total) by mouth 2 (two) times daily., Disp: 60 tablet, Rfl: 6    lisinopril (PRINIVIL,ZESTRIL) 20 MG tablet, Take 1 tablet (20 mg total) by mouth once daily., Disp: 90 tablet, Rfl: 1    " meclizine (ANTIVERT) 25 mg tablet, TAKE 1 TABLET (25 MG TOTAL) BY MOUTH 3 (THREE) TIMES DAILY AS NEEDED FOR DIZZINESS., Disp: 30 tablet, Rfl: 0    mirtazapine (REMERON) 30 MG tablet, Decrease to 1/2 tablet nightly for 2 weeks, then stop., Disp: 30 tablet, Rfl: 0    gabapentin (NEURONTIN) 300 MG capsule, Take 1 capsule (300 mg total) by mouth every evening., Disp: 30 capsule, Rfl: 11    primidone (MYSOLINE) 50 MG Tab, Take 1/2 tablet nightly for 2 weeks then increase to a full tablet nightly., Disp: 30 tablet, Rfl: 2  Any other notable medications as documented in HPI    Allergies  Review of patient's allergies indicates:  No Known Allergies    Social History  Social History     Socioeconomic History    Marital status:      Spouse name: Not on file    Number of children: Not on file    Years of education: Not on file    Highest education level: Not on file   Social Needs    Financial resource strain: Not on file    Food insecurity - worry: Not on file    Food insecurity - inability: Not on file    Transportation needs - medical: Not on file    Transportation needs - non-medical: Not on file   Occupational History    Not on file   Tobacco Use    Smoking status: Never Smoker    Smokeless tobacco: Never Used   Substance and Sexual Activity    Alcohol use: Yes     Alcohol/week: 1.0 oz     Types: 2 Standard drinks or equivalent per week    Drug use: No    Sexual activity: Not Currently   Other Topics Concern    Patient feels they ought to cut down on drinking/drug use Not Asked    Patient annoyed by others criticizing their drinking/drug use Not Asked    Patient has felt bad or guilty about drinking/drug use Not Asked    Patient has had a drink/used drugs as an eye opener in the AM Not Asked   Social History Narrative    [facility] lives [facility], an assisted living.     Any other notable Social History as documented in HPI.    Family History  History reviewed. No pertinent family  history.  Any other notable FMH as documented in HPI.    Physical Exam  BP (!) 153/96   Pulse 73     General: Well-developed, well-groomed. No apparent distress  HENT: Normocephalic, atraumatic.    Cardiovascular: Regular rate and rhythm with no murmurs, rubs or gallops.    Chest: Lungs clear to auscultation bilaterally.  No wheezes, stridor, ronchi appreciated.  Abdomen: Normoactive bowel sounds present.  Soft, nontender to palpation.  Musculoskeletal: No peripheral edema    Neurologic Exam: The patient is awake, alert and oriented to person, place. Language is fluent.  Tangential thought processes, difficult to redirect at times.    Cranial nerves:   Pupils are round and reactive to light and accommodation.   Visual fields are full to confrontation.    Ocular motility is full in all cardinal positions of gaze.   Facial sensation is normal to light touch.   Facial activation is symmetric.   Hearing is symmetric bilaterally.   Palate elevates symmetrically.   Shoulder elevation is symmetric and full strength bilaterally.   Tongue is midline and neck rotation strength is normal bilaterally.      Motor examination of all extremities demonstrates normal bulk and tone in all four limbs. There are no atrophy or fasciculations. Strength is 5/5 in the upper and 4/5 lower extremities bilaterally.    Sensory examination is normal light touch in BUE and BLE.  Romberg is negative.    Deep tendon reflexes are 2+ and symmetric in the upper and lower extremities bilaterally.      Gait: Stable casual gait. Unable to complete tandem gait.    Coordination: Finger to nose w/ mild dysmetria b/l.    Miscellaneous: Vocal ataxia. Increased tone on R      Lab and Test Results      Results for COUSINS, TIFFANY (MRN [id number]) as of 8/28/2018 16:03   Ref. Range 5/9/2018 11:01   Thiamine Latest Ref Range: 38 - 122 ug/L 75   Vitamin E Latest Ref Range: 500 - 1800 ug/dL 909   Glutamic Acid Decarb Ab Latest Ref Range: <=0.02 nmol/L 0.00   TSH  Latest Ref Range: 0.400 - 4.000 uIU/mL 1.762   T4 Total Latest Ref Range: 4.5 - 11.5 ug/dL 5.4   Thyroperoxidase Antibodies Latest Ref Range: <6.0 IU/mL <6.0   Antigliadin Ab IgG Latest Ref Range: <20 UNITS 4   Antigliadin Abs, IgA Latest Ref Range: <20 UNITS 21 (H)   AChR Binding Ab, Serum Latest Ref Range: <=0.02 nmol/L 0.00   AChR Ganglionic Neuronal Ab Latest Ref Range: <=0.02 nmol/L 0.00   NMO Interpretive Comments Unknown SEE BELOW   PAVAL reflex test added Unknown None.   Striated Muscle Ab Latest Ref Range: <1:120 titer Negative         Images:  7/10/18 MRI Brain w/ and w/o: Per independent resident review and interpretation,  B/l hygromas.  Possible trace subacute-remote SDH overlying L frontal lobe.      Assessment and Plan    Problem List Items Addressed This Visit        Neuro    Dementia with behavioral disturbance    Current Assessment & Plan     Assessment  Breonna Silva is a 63 y.o. female  w/ PMH significant for depression/anxiety, dementia, seizures, parkinson's disease presenting for new evaluation.    Overall assessment is difficult given her alcohol/valium use, tangential thought processes.  At this point, we had an extended discussion regarding self-harm.  During this time, she agreed to not harm herself and call/follow-up at any point if she felt she could hurt herself.  She also complained of strange dreams bothering her (on mirtazapine).      Recommendations & Plan:  -Start primidone   -Taper off mirtazapine over 2 weeks  -RTC 6 weeks               Other Visit Diagnoses     Dementia without behavioral disturbance, unspecified dementia type    -  Primary    Relevant Medications    primidone (MYSOLINE) 50 MG Tab    Insomnia, unspecified type        Relevant Medications    primidone (MYSOLINE) 50 MG Tab    Tremor        Relevant Medications    primidone (MYSOLINE) 50 MG Tab            Baldo Saul MD  Neurology Resident   [facility] [address] [place]  [address]

## 2018-08-28 NOTE — PATIENT INSTRUCTIONS
-Take 1/2 tablet of primidone nightly for 2 weeks, THEN increase to 1 full tablet nightly and continue.  -DECREASE mirtazapine to 1/2 tablet for 2 weeks, THEN stop completely.  -Call/message for any questions regarding medications or any thoughts of self-harm.  -Return to clinic in 6 weeks.    Baldo Saul MD   [facility]   Phone Number: [phone]  Fax Number: [phone]

## 2018-08-31 PROBLEM — F03.918 DEMENTIA WITH BEHAVIORAL DISTURBANCE: Status: ACTIVE | Noted: 2018-08-31

## 2018-08-31 NOTE — ASSESSMENT & PLAN NOTE
Assessment  Breonna Silva is a 63 y.o. female  w/ PMH significant for depression/anxiety, dementia, seizures, parkinson's disease presenting for new evaluation.    Overall assessment is difficult given her alcohol/valium use, tangential thought processes.  At this point, we had an extended discussion regarding self-harm.  During this time, she agreed to not harm herself and call/follow-up at any point if she felt she could hurt herself.  She also complained of strange dreams bothering her (on mirtazapine).      Recommendations & Plan:  -Start primidone   -Taper off mirtazapine over 2 weeks  -RTC 6 weeks

## 2018-09-19 RX ORDER — LISINOPRIL 20 MG/1
20 TABLET ORAL DAILY
Qty: 90 TABLET | Refills: 1 | Status: SHIPPED | OUTPATIENT
Start: 2018-09-19 | End: 2019-04-10

## 2018-10-12 ENCOUNTER — NURSE TRIAGE (OUTPATIENT)
Dept: ADMINISTRATIVE | Facility: CLINIC | Age: 63
End: 2018-10-12

## 2018-10-12 NOTE — TELEPHONE ENCOUNTER
Reason for Disposition   SEVERE swelling (e.g., swelling extends above knee, entire leg is swollen, weeping fluid)    Protocols used: ST LEG SWELLING AND EDEMA-A-OH    Patient calling c/o herminio leg swelling extending up to her knees. States she is now unable to walk at all. She states her legs are also discolored, turning purple. Care advice discussed. Patient initially refusing to go to ED. No appointment available with PCP today. She states she does not want to spend the money to go to ED. Patient encouraged to contact EMS and go to ED now and states she will follow advice. Please contact caller directly to discuss further care advice.

## 2018-10-12 NOTE — TELEPHONE ENCOUNTER
Call place to pt regarding recommendations from Dr. Del Valle. Was informed that she needs to go to ED for leg swelling. Pt stated she will go to ED as advised. The patient verbalized understanding and had no further questions or concerns.  Juan M Marroquin LPN

## 2018-10-17 ENCOUNTER — HOSPITAL ENCOUNTER (EMERGENCY)
Facility: OTHER | Age: 63
Discharge: HOME | End: 2018-10-17
Attending: EMERGENCY MEDICINE
Payer: MEDICARE

## 2018-10-17 VITALS
OXYGEN SATURATION: 99 % | DIASTOLIC BLOOD PRESSURE: 88 MMHG | RESPIRATION RATE: 18 BRPM | HEIGHT: 65 IN | SYSTOLIC BLOOD PRESSURE: 183 MMHG | WEIGHT: 120 LBS | HEART RATE: 76 BPM | BODY MASS INDEX: 19.99 KG/M2

## 2018-10-17 DIAGNOSIS — M79.89 LEG SWELLING: Primary | ICD-10-CM

## 2018-10-17 LAB
ALBUMIN SERPL BCP-MCNC: 4.8 G/DL
ALP SERPL-CCNC: 74 U/L
ALT SERPL W/O P-5'-P-CCNC: 21 U/L
ANION GAP SERPL CALC-SCNC: 14 MMOL/L
AST SERPL-CCNC: 49 U/L
BACTERIA #/AREA URNS HPF: ABNORMAL /HPF
BASOPHILS # BLD AUTO: ABNORMAL K/UL
BASOPHILS NFR BLD: 0 %
BILIRUB SERPL-MCNC: 0.8 MG/DL
BILIRUB UR QL STRIP: ABNORMAL
BNP SERPL-MCNC: 181 PG/ML
BUN SERPL-MCNC: 9 MG/DL
CALCIUM SERPL-MCNC: 8.6 MG/DL
CHLORIDE SERPL-SCNC: 103 MMOL/L
CLARITY UR: ABNORMAL
CO2 SERPL-SCNC: 27 MMOL/L
COLOR UR: YELLOW
CREAT SERPL-MCNC: 0.7 MG/DL
DIFFERENTIAL METHOD: ABNORMAL
EOSINOPHIL # BLD AUTO: ABNORMAL K/UL
EOSINOPHIL NFR BLD: 4 %
ERYTHROCYTE [DISTWIDTH] IN BLOOD BY AUTOMATED COUNT: 13.4 %
EST. GFR  (AFRICAN AMERICAN): >60 ML/MIN/1.73 M^2
EST. GFR  (NON AFRICAN AMERICAN): >60 ML/MIN/1.73 M^2
GLUCOSE SERPL-MCNC: 89 MG/DL
GLUCOSE UR QL STRIP: NEGATIVE
HCT VFR BLD AUTO: 39.4 %
HGB BLD-MCNC: 13.4 G/DL
HGB UR QL STRIP: ABNORMAL
KETONES UR QL STRIP: NEGATIVE
LEUKOCYTE ESTERASE UR QL STRIP: NEGATIVE
LYMPHOCYTES # BLD AUTO: ABNORMAL K/UL
LYMPHOCYTES NFR BLD: 59 %
MCH RBC QN AUTO: 34.4 PG
MCHC RBC AUTO-ENTMCNC: 34 G/DL
MCV RBC AUTO: 101 FL
MICROSCOPIC COMMENT: ABNORMAL
MONOCYTES # BLD AUTO: ABNORMAL K/UL
MONOCYTES NFR BLD: 6 %
NEUTROPHILS NFR BLD: 31 %
NITRITE UR QL STRIP: POSITIVE
PH UR STRIP: 6 [PH] (ref 5–8)
PLATELET # BLD AUTO: 155 K/UL
PLATELET BLD QL SMEAR: ABNORMAL
PMV BLD AUTO: 10.6 FL
POTASSIUM SERPL-SCNC: 3.8 MMOL/L
PROT SERPL-MCNC: 8.4 G/DL
PROT UR QL STRIP: NEGATIVE
RBC # BLD AUTO: 3.89 M/UL
RBC #/AREA URNS HPF: 0 /HPF (ref 0–4)
SODIUM SERPL-SCNC: 144 MMOL/L
SP GR UR STRIP: >=1.03 (ref 1–1.03)
SQUAMOUS #/AREA URNS HPF: 1 /HPF
URN SPEC COLLECT METH UR: ABNORMAL
UROBILINOGEN UR STRIP-ACNC: NEGATIVE EU/DL
WBC # BLD AUTO: 2.77 K/UL
WBC #/AREA URNS HPF: 14 /HPF (ref 0–5)

## 2018-10-17 PROCEDURE — 83880 ASSAY OF NATRIURETIC PEPTIDE: CPT

## 2018-10-17 PROCEDURE — 99283 EMERGENCY DEPT VISIT LOW MDM: CPT

## 2018-10-17 PROCEDURE — 87186 SC STD MICRODIL/AGAR DIL: CPT

## 2018-10-17 PROCEDURE — 80053 COMPREHEN METABOLIC PANEL: CPT

## 2018-10-17 PROCEDURE — 85007 BL SMEAR W/DIFF WBC COUNT: CPT

## 2018-10-17 PROCEDURE — 87088 URINE BACTERIA CULTURE: CPT

## 2018-10-17 PROCEDURE — 87077 CULTURE AEROBIC IDENTIFY: CPT

## 2018-10-17 PROCEDURE — 85027 COMPLETE CBC AUTOMATED: CPT

## 2018-10-17 PROCEDURE — 87086 URINE CULTURE/COLONY COUNT: CPT

## 2018-10-17 PROCEDURE — 81000 URINALYSIS NONAUTO W/SCOPE: CPT

## 2018-10-17 RX ORDER — FUROSEMIDE 20 MG/1
20 TABLET ORAL DAILY
Qty: 14 TABLET | Refills: 0 | Status: SHIPPED | OUTPATIENT
Start: 2018-10-17 | End: 2018-11-02 | Stop reason: SDUPTHER

## 2018-10-17 NOTE — ED PROVIDER NOTES
"Encounter Date: 10/17/2018    SCRIBE #1 NOTE: I, Matteo Stevenson, am scribing for, and in the presence of, Dr. Hester.       History     Chief Complaint   Patient presents with    Foot Swelling     Pt c/o herminio feet pain and  swelling for the past three weeks.She has been treating with Ice with no relief. She reports the swelling started after she started new meds for Parkinson. She was told to report to the ED for evaluation by her PCP. Pt denies any SOB.      Seen by provider: 5:05 PM    Patient is a 63 y.o. Female with a history of parkinson's  who presents to the ED with complaint of bilateral lower extremity swelling, which began three weeks ago. Patient reports her symptoms began after she began taking a new medication, primidone, for her parkinson's disease about three weeks ago. She initially began to experience "a sharp pain" in the top of her left foot extending to to the anterior ankle, which was followed by left foot swelling. She then began to have right foot swelling as well, however she has not had any pain in the right foot. She reports intermittent redness to both feet, but states "they've been all sorts of colors." Left foot pain and bilateral foot and leg swelling have progressively worsened since onset. She reports elevating and icing the feet with only occasional improvement to pain and swelling. She does note that the pain and swelling is worse after spending time standing on her feet. She called her neurologist last week with concern for her symptoms and was told to come to the ED for evaluation. She denies chest pain or shortness of breath. She reports a recent loss of appetite. She also reports chronic dizziness. Patient reports gaining weight after being diagnosed with parkinson's disease 3.5 years ago due to increased inactivity.       The history is provided by the patient.     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 No pertinent family history.  Social History     Tobacco Use    Smoking status: Never Smoker    Smokeless tobacco: Never Used   Substance Use Topics    Alcohol use: Yes     Alcohol/week: 1.0 oz     Types: 2 Standard drinks or equivalent per week    Drug use: No     Review of Systems   Constitutional: Positive for appetite change (decreased) and unexpected weight change (weight gain). Negative for chills and fever.   Respiratory: Negative for shortness of breath.    Cardiovascular: Positive for leg swelling (bilateral feet). Negative for chest pain.   Gastrointestinal: Negative for diarrhea and vomiting.   Genitourinary: Negative for dysuria.   Musculoskeletal: Positive for gait problem (due to parkinson's disease). Negative for back pain.        Positive for left foot pain.   Skin: Positive for color change (intermittent color changes to bilateral feet). Negative for rash.   Neurological: Positive for dizziness (chronic). Negative for headaches.   Psychiatric/Behavioral: Negative for confusion.       Physical Exam     Initial Vitals [10/17/18 1536]   BP Pulse Resp Temp SpO2   (!) 178/114 82 18 -- 100 %      MAP       --         Physical Exam    Nursing note and vitals reviewed.  Constitutional: She appears well-developed and well-nourished. No distress.   HENT:   Head: Normocephalic and atraumatic.   Eyes: Conjunctivae and EOM are normal.   Neck: Normal range of motion. Neck supple.    Cardiovascular: Normal rate, regular rhythm and normal heart sounds.   No murmur heard.  2+ DP/PT pulses bilaterally.   Pulmonary/Chest: Breath sounds normal. No respiratory distress. She has no wheezes. She has no rales.   Musculoskeletal: Normal range of motion. She exhibits edema (1+ edema to the mid-shins bilaterally). She exhibits no tenderness.   Neurological: She is alert and oriented to person, place, and time.   Skin: Skin is warm and dry. Capillary refill takes less than 2 seconds.   No erythema of the lower extremities.   Psychiatric: She has a normal mood and affect. Her behavior is normal. Thought content normal.         ED Course   Procedures  Labs Reviewed   COMPREHENSIVE METABOLIC PANEL - Abnormal; Notable for the following components:       Result Value    Calcium 8.6 (*)     AST 49 (*)     All other components within normal limits   CBC W/ AUTO DIFFERENTIAL - Abnormal; Notable for the following components:    WBC 2.77 (*)     RBC 3.89 (*)      (*)     MCH 34.4 (*)     Gran% 31.0 (*)     Lymph% 59.0 (*)     All other components within normal limits   B-TYPE NATRIURETIC PEPTIDE - Abnormal; Notable for the following components:     (*)     All other components within normal limits   URINALYSIS - Abnormal; Notable for the following components:    Appearance, UA Hazy (*)     Specific Gravity, UA >=1.030 (*)     Bilirubin (UA) 1+ (*)     Occult Blood UA 1+ (*)     Nitrite, UA Positive (*)     All other components within normal limits   URINALYSIS MICROSCOPIC - Abnormal; Notable for the following components:    WBC, UA 14 (*)     Bacteria, UA Many (*)     All other components within normal limits   CULTURE, URINE   CULTURE, URINE          Imaging Results    None          Medical Decision Making:   History:   Old Medical Records: I decided to obtain old medical records.  Old Records Summarized: other records and records from clinic visits.  Initial Assessment:   5:05PM:  Patient is a  63-year-old female who presents to the emergency department with lower extremity swelling.  Patient appears well, nontoxic.  I do not appreciate any evidence of erythema or evidence of cellulitis.  The lower extremity swelling is symmetric. Will plan to check labs, BNP, will continue to follow and reassess.  Clinical Tests:   Lab Tests: Ordered and Reviewed    7:40 PM:  Pt doing well.  She remains stable.  Patient is adamant about not wanting to stay in the hospital.  Her BNP is slightly elevated, and she would likely benefit from an echocardiogram.  However I do feel that she is stable enough to do this as an outpatient.  Her creatinine is normal. Her urine does have positive nitrites and many bacteria, but no leukocytes.  She is also asymptomatic and therefore will await culture.  She does have close follow-up.  Will have her follow up for an outpatient echocardiogram.  Will also start her on a low-dose Lasix to see if this helps alleviate her symptoms. I updated the patient regarding results and I counseled the patient regarding supportive care measures.  I have discussed with the pt ED return warnings and need for close PCP f/u.  Pt agreeable to plan and all questions answered.  I feel that pt is stable for discharge and management as an outpatient and no further intervention is needed at this time.  Pt is comfortable returning to the ED if needed.  Will DC home in stable condition.                Scribe Attestation:   Scribe #1: I performed the above scribed service and the documentation accurately describes the services I performed. I attest to the accuracy of the note.    Attending Attestation:           Physician Attestation for Scribe:  Physician Attestation Statement for Scribe #1: I, Dr. Hester, reviewed documentation, as scribed by Matteo Stevenson in my presence, and it is both accurate and complete.                    Clinical Impression:     1. Leg swelling                                 Valery eHster,  MD  10/17/18 8043

## 2018-10-17 NOTE — ED NOTES
Pt aware of need for urine specimen, states she does not have to go at this time. Pt provided urine, OK per MD.

## 2018-10-17 NOTE — ED NOTES
IV access failed x 2 but able to obtain blood specimen. Blood specimen walked to lab. Will notify MD and obtain IV access if needed.

## 2018-10-17 NOTE — ED NOTES
Pt reports the new medicine is primidone that she started approx one month ago and then started with the feet swelling shortly after.

## 2018-10-17 NOTE — ED NOTES
Assumed care: pt is alert, she is not happy with wait, wants to leave; states you took my urine, you took my blood, your taking my blood pressure, why can't you send the results to my doctor and let me leave; discussed with pt at length, explained, she wants to leave; offered to have her sign out AMA if she liked, and ensured her that her neurologist would have access to her results; she declined;

## 2018-10-18 NOTE — DISCHARGE INSTRUCTIONS
We have prescribed you medication for leg swelling.  Please fill and take as directed.    Please return to the ER if you have chest pain, difficulty breathing, fevers, altered mental status, dizziness, weakness, or any other concerns.      Follow up with your primary care physician.  You may need to get an echocardiogram of your heart for further evaluation.

## 2018-10-18 NOTE — ED NOTES
Pt discharged; vital signs not retaken as pt was in a hurry to leave, unhappy with wait, and had already removed cuff and oximeter; alert, NAD; ambulatory with cane;

## 2018-10-19 LAB — BACTERIA UR CULT: NORMAL

## 2018-10-30 NOTE — PROVIDER PROGRESS NOTES - EMERGENCY DEPT.
Encounter Date: 10/17/2018    ED Physician Progress Notes        Physician Note:   11:54 AM patient called after receiving certified letter in the mail. She reports that she is still having urinary frequency. I called in keflex 250mg 4 times daily for 7 days with no refills. She has an appointment with her PCP next Friday. I encouraged her to have her urine re-checked at that time. She verbalizes understanding. ELIZABET SANCHEZ

## 2018-10-31 ENCOUNTER — TELEPHONE (OUTPATIENT)
Dept: INTERNAL MEDICINE | Facility: CLINIC | Age: 63
End: 2018-10-31

## 2018-10-31 NOTE — TELEPHONE ENCOUNTER
----- Message from Petra Patel sent at 10/31/2018  4:57 PM CDT -----  Contact: Self            Name of Who is Calling: CHANTEL GOMEZ [[id number]]      What is the request in detail: Pt states she went to the ED regarding swelling in her legs. Pt is requesting a different type of medication. Please contact to further discuss and advise.        Can the clinic reply by [facility]: N      What Number to Call Back if not [facility]: [phone]

## 2018-11-02 DIAGNOSIS — Z51.81 MEDICATION MONITORING ENCOUNTER: Primary | ICD-10-CM

## 2018-11-02 RX ORDER — FUROSEMIDE 20 MG/1
20 TABLET ORAL DAILY
Qty: 14 TABLET | Refills: 0 | Status: SHIPPED | OUTPATIENT
Start: 2018-11-02 | End: 2019-01-03 | Stop reason: SDUPTHER

## 2018-11-02 NOTE — TELEPHONE ENCOUNTER
----- Message from Naima Carvajal sent at 11/2/2018 11:32 AM CDT -----  Contact: mahesh Michele the clinic reply in [facility]: no      Please refill the medication(s) listed below. Please call the patient when the prescription(s) is ready for  at this phone number   [phone]      Medication #1 furosemide (LASIX) 20 MG tablet    Medication #2       Preferred Pharmacy: [facility]/PHARMACY #[id number] - [address]

## 2018-11-13 ENCOUNTER — OFFICE VISIT (OUTPATIENT)
Dept: NEUROLOGY | Facility: CLINIC | Age: 63
End: 2018-11-13
Payer: MEDICARE

## 2018-11-13 VITALS
HEART RATE: 78 BPM | HEIGHT: 65 IN | BODY MASS INDEX: 19.97 KG/M2 | SYSTOLIC BLOOD PRESSURE: 161 MMHG | DIASTOLIC BLOOD PRESSURE: 97 MMHG

## 2018-11-13 DIAGNOSIS — F03.90 DEMENTIA WITHOUT BEHAVIORAL DISTURBANCE, UNSPECIFIED DEMENTIA TYPE: Primary | ICD-10-CM

## 2018-11-13 DIAGNOSIS — R42 VERTIGO, CONSTANT: ICD-10-CM

## 2018-11-13 DIAGNOSIS — G21.9 SECONDARY PARKINSONISM, UNSPECIFIED SECONDARY PARKINSONISM TYPE: ICD-10-CM

## 2018-11-13 DIAGNOSIS — F10.27 ALCOHOL DEPENDENCE WITH ALCOHOL-INDUCED PERSISTING DEMENTIA: ICD-10-CM

## 2018-11-13 DIAGNOSIS — R56.9 SEIZURE: ICD-10-CM

## 2018-11-13 PROCEDURE — 99214 OFFICE O/P EST MOD 30 MIN: CPT | Mod: HCNC,GC,S$GLB, | Performed by: STUDENT IN AN ORGANIZED HEALTH CARE EDUCATION/TRAINING PROGRAM

## 2018-11-13 PROCEDURE — 3080F DIAST BP >= 90 MM HG: CPT | Mod: CPTII,HCNC,GC,S$GLB | Performed by: STUDENT IN AN ORGANIZED HEALTH CARE EDUCATION/TRAINING PROGRAM

## 2018-11-13 PROCEDURE — 3008F BODY MASS INDEX DOCD: CPT | Mod: CPTII,HCNC,GC,S$GLB | Performed by: STUDENT IN AN ORGANIZED HEALTH CARE EDUCATION/TRAINING PROGRAM

## 2018-11-13 PROCEDURE — 99999 PR PBB SHADOW E&M-EST. PATIENT-LVL III: CPT | Mod: PBBFAC,HCNC,GC, | Performed by: STUDENT IN AN ORGANIZED HEALTH CARE EDUCATION/TRAINING PROGRAM

## 2018-11-13 PROCEDURE — 3077F SYST BP >= 140 MM HG: CPT | Mod: CPTII,HCNC,GC,S$GLB | Performed by: STUDENT IN AN ORGANIZED HEALTH CARE EDUCATION/TRAINING PROGRAM

## 2018-11-13 NOTE — PATIENT INSTRUCTIONS
-Blood work  -Drink lots of water  -Stop primidone.  -Return to clinic in 2 months  -Call if you feel unsafe    Baldo Saul MD  [facility]   Phone Number: [phone]  Fax Number: [phone]

## 2018-11-13 NOTE — PROGRESS NOTES
"See resident note.  We saw the patient together, I examined individually, and we discussed the assessment and plan as he documented in his note.    63 y.o. W with dementia and seizures (episodes of LOC) seen in f/u.  Continues to complain of dizziness and reports infrequent "seizures" (episodes of feeling "off", "stiff").    Per resident, patient would like to see Dr. Saul with another provider, not me going forward (I saw her from [id number], met her  one time in 2011 before his unexpected death in 2012, but she believes I have some bias toward her regarding her ).     "

## 2018-11-13 NOTE — PROGRESS NOTES
"Neurology Clinic  Follow-up Visit    Patient Name: Breonna Silva  MRN: [id number]    CC: dementia, parkinson's    HPI: Breonna Silva is a 63 y.o. female w/ PMH significant for depression/anxiety, dementia, seizures, parkinson's disease presenting as follow-up.  Pt has previously been seen by Lindsay Collazo, and Jose.     11/13/18 Interval History  LLE>RLE edema, "blew me up" with primidone.  Seen in ED on 10/17, started on lasix (20 mg Qdaily) with subsequent improvement.  Urine culture resulted with E coli, took antibiotics (completed yesterday, per pt).      Now off primidone and mirtazapine.       Continues to report dizziness: constant, morning/noon/night.  Feels like head is a "big old cottonball."  Worse with standing.  No improvement with valium, but feels that alcohol helps her dizziness (couple of drinks/week).  Not necessarily worse with shaking head. Not taking meclizine.  Not better/worse with lasix dose.    She continues to report abnormal episodes of what she is attributing to seizures (feels "off" and "stiff"), but is unable to further clarify a frequency.  Continues to take keppra.    Continues to frequently drink at local bar, unable to specify frequency.    Denies active SIHI at this time.    Initial History  She is a poor historian, displays tangential thought processes.  At times, she is difficult to redirect.  She reports a history of 3.5 years of difficulty with ambulation.  Describes difficulty getting up, notes she bumps into walls when going to the bathroom.  With regards to dizziness, she notes she fell getting out of bed 2-4 months ago and hit her nose.  She is worried about breaking antiques in her living space.  Doesn't drive secondary to dizziness.    Describes a history of seizures, for which she takes keppra.  States last seizure was 1 week ago.  Describes seizures as shaking b/l, during which she maintains awareness.  Denies urinary/fecal incontinence, tongue " "biting.    States she feels like the "baddest bitch" at times.  States she feels as though she can say anything she wants because she is older than 60, but doesn't want to be that way.  States her memory is "fine."    She lives in an assisted living facility.  Initially [facility] name [facility] but I believe the correct name [facility] (internet search).    Previously worked as a  and taught physical fitness.  Drinks 1 glass of vodka per day, states she wishes she could drink a bottle a day.  No cigarettes, recreational drug use.  Currently taking valium TID for her RUE, states she can't move it like she used to, which she attributes to stiffness.  Ongoing x3.5 years.  In general, R>L.      FMH notable for mother who  of breast cancer at 33 yo.  No other family members with known history of similar dementia/shaking difficulties.      Thinks about suicide "all the time" because she is so tired being "dizzy", "sick", and "needing help to walk."  States she would like to drink a bottle of drano and 20/20.  Asks if this would be painless, states she would like a method that is painless.  Doesn't use a cane, although she has one.  During interview, states she will not hurt herself in the future, does not have current intention to pursue self-harm.      During her last evaluation with Dr. Martini, where workup revealed elevated antigliadin Ig.  Pt states she eats a lot of pasta, but experiences a significant amount of bloating with pasta.        Review of Systems:  General: No fevers, chills  Eyes: No changes in vision  ENT: No changes in hearing  Respiratory: No SOB  CV: No chest pain, palpitations.  +BLE edema  GI: No diarrhea, blood in stool  Urinary: No dysuria, hematuria  Skin: No rashes  Neurological: +weakness, confusion.   Psychiatric: No auditory nor visual hallucinations.  -SIHI.      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  , Disp: , Rfl:     diazePAM (VALIUM) 2 MG tablet, TAKE 1 TABLET BY MOUTH 3 TIMES A DAY AS NEEDED FOR ANXIETY, Disp: 90 tablet, Rfl: 5    folic acid (FOLVITE) 800 MCG Tab, Take 800 mcg by mouth once daily.  , Disp: , Rfl:     furosemide (LASIX) 20 MG tablet, Take 1 tablet (20 mg total) by mouth once daily., Disp: 14 tablet, Rfl: 0    gabapentin (NEURONTIN) 300 MG capsule, Take 1 capsule (300 mg total) by mouth every evening., Disp: 30 capsule, Rfl: 11    levETIRAcetam (KEPPRA) 1000 MG tablet, Take 1 tablet (1,000 mg total) by mouth 2 (two) times daily., Disp: 60 tablet, Rfl: 6    lisinopril (PRINIVIL,ZESTRIL) 20 MG tablet, TAKE 1 TABLET (20 MG TOTAL) BY MOUTH ONCE DAILY., Disp: 90 tablet, Rfl: 1    meclizine (ANTIVERT) 25 mg tablet, TAKE 1 TABLET (25 MG TOTAL) BY MOUTH 3 (THREE) TIMES DAILY AS NEEDED FOR DIZZINESS., Disp: 30 tablet, Rfl: 0  Any other notable medications as documented in HPI    Allergies  Review of patient's allergies indicates:  No Known Allergies    Social History  Social History     Socioeconomic History    Marital status:      Spouse name: Not on file    Number of children: Not on file    Years of education: Not on file    Highest education level: Not on file   Social Needs    Financial resource strain: Not on file    Food insecurity - worry: Not on file    Food insecurity - inability: Not on file    Transportation needs - medical: Not on file    Transportation needs - non-medical: Not on file   Occupational History    Not on file  "  Tobacco Use    Smoking status: Never Smoker    Smokeless tobacco: Never Used   Substance and Sexual Activity    Alcohol use: Yes     Alcohol/week: 1.0 oz     Types: 2 Standard drinks or equivalent per week    Drug use: No    Sexual activity: Not Currently   Other Topics Concern    Patient feels they ought to cut down on drinking/drug use Not Asked    Patient annoyed by others criticizing their drinking/drug use Not Asked    Patient has felt bad or guilty about drinking/drug use Not Asked    Patient has had a drink/used drugs as an eye opener in the AM Not Asked   Social History Narrative    [facility] lives [facility], an assisted living.     Any other notable Social History as documented in HPI.    Family History  No family history on file.  Any other notable FMH as documented in HPI.    Physical Exam  BP (!) 161/97   Pulse 78   Ht 5' 5" (1.651 m)   BMI 19.97 kg/m²     General: Well-developed, well-groomed. No apparent distress  HENT: Normocephalic, atraumatic.    Cardiovascular: Regular rate and rhythm with no murmurs, rubs or gallops.    Chest: Lungs clear to auscultation bilaterally.  No wheezes, stridor, ronchi appreciated.  Abdomen: Normoactive bowel sounds present.  Soft, nontender to palpation.  Musculoskeletal: 2+ BLE edema, L>R    Neurologic Exam: The patient is awake, alert and oriented to person, place. Language is fluent.  Tangential thought processes, difficult to redirect at times.    Cranial nerves:   Pupils are round and reactive to light and accommodation.   Visual fields are full to confrontation.    Ocular motility is full in all cardinal positions of gaze.   Facial sensation is normal to light touch.   Facial activation is symmetric.   Hearing is symmetric bilaterally.   Palate elevates symmetrically.   Shoulder elevation is symmetric and full strength bilaterally.   Tongue is midline and neck rotation strength is normal bilaterally.      Motor examination of all extremities " demonstrates normal bulk and tone in all four limbs. There are no atrophy or fasciculations. Strength is 5/5 in the upper and 4/5 lower extremities bilaterally.    Sensory examination is normal light touch in BUE and BLE.    Deep tendon reflexes are 2+ and symmetric in the upper and lower extremities bilaterally.      Gait: Stable casual gait. Unable to complete tandem gait.    Coordination: Finger to nose w/ mild dysmetria b/l.    Miscellaneous: Vocal ataxia. Increased tone on R      Lab and Test Results      Results for TIFFANY GOMEZ (MRN [id number]) as of 8/28/2018 16:03   Ref. Range 5/9/2018 11:01   Thiamine Latest Ref Range: 38 - 122 ug/L 75   Vitamin E Latest Ref Range: 500 - 1800 ug/dL 909   Glutamic Acid Decarb Ab Latest Ref Range: <=0.02 nmol/L 0.00   TSH Latest Ref Range: 0.400 - 4.000 uIU/mL 1.762   T4 Total Latest Ref Range: 4.5 - 11.5 ug/dL 5.4   Thyroperoxidase Antibodies Latest Ref Range: <6.0 IU/mL <6.0   Antigliadin Ab IgG Latest Ref Range: <20 UNITS 4   Antigliadin Abs, IgA Latest Ref Range: <20 UNITS 21 (H)   AChR Binding Ab, Serum Latest Ref Range: <=0.02 nmol/L 0.00   AChR Ganglionic Neuronal Ab Latest Ref Range: <=0.02 nmol/L 0.00   NMO Interpretive Comments Unknown SEE BELOW   PAVAL reflex test added Unknown None.   Striated Muscle Ab Latest Ref Range: <1:120 titer Negative         Images:  7/10/18 MRI Brain w/ and w/o: Per independent resident review and interpretation,  B/l hygromas.  Possible trace subacute-remote SDH overlying L frontal lobe.      Assessment and Plan    Problem List Items Addressed This Visit        Neuro    Secondary parkinsonism    Overview     Failed multiple meds         Current Assessment & Plan       -Failed multiple medications, maintain current regimen for now         Dementia - Primary    Overview     2011 Neuropsychological testing is consistent with moderate dementia         Current Assessment & Plan     Assessment  Breonna Gomez is a 63 y.o. female  w/ PMH  "significant for depression/anxiety, dementia, seizures, parkinson's disease presenting for follow-up.    Overall assessment is difficult given her level of disability and tangential thought processes.  At this point, she has failed/not tolerated multiple medications, and is recovering from UTI.  Will plan to maintain current regimen for now without trying any other medications until next visit.    Recommendations & Plan:  -STOP primidone, mirtazapine  -Check folate, B12, homocysteine, MMA  -RTC in 2 months on day when Dr. Varghese is staffing             Relevant Orders    VITAMIN B12    METHYLMALONIC ACID, SERUM    Homocysteine, serum    FOLATE    Seizure    Overview     Maintained on keppra         Current Assessment & Plan     Continue keppra 1 g BID            Psychiatric    Alcohol dependence with alcohol-induced persisting dementia    Current Assessment & Plan     Continues to drink, suspect frequently, but unable to specify frequency/quantity.  Also takes valium on a regular basis.    -Check B12, folate, MMA, homocysteine         Relevant Orders    VITAMIN B12    METHYLMALONIC ACID, SERUM    Homocysteine, serum    FOLATE       ENT    Vertigo, constant    Overview     Constant throughout the day.  Feels like head is a "big old cottonball."  Worse with standing.  No improvement with valium, but feels that alcohol helps her dizziness (couple of drinks/week).  Not necessarily worse with shaking head. Not taking meclizine.  Not better/worse with lasix dose.         Current Assessment & Plan     Unclear etiology, continue to monitor.                     Baldo Saul MD  Neurology Resident   [facility]  [address] [place] [address]    "

## 2018-11-17 NOTE — ASSESSMENT & PLAN NOTE
Continues to drink, suspect frequently, but unable to specify frequency/quantity.  Also takes valium on a regular basis.    -Check B12, folate, MMA, homocysteine

## 2018-11-17 NOTE — ASSESSMENT & PLAN NOTE
Assessment  Breonna Silva is a 63 y.o. female  w/ PMH significant for depression/anxiety, dementia, seizures, parkinson's disease presenting for follow-up.    Overall assessment is difficult given her level of disability and tangential thought processes.  At this point, she has failed/not tolerated multiple medications, and is recovering from UTI.  Will plan to maintain current regimen for now without trying any other medications until next visit.    Recommendations & Plan:  -STOP primidone, mirtazapine  -Check folate, B12, homocysteine, MMA  -RTC in 2 months on day when Dr. Varghese is staffing

## 2018-11-19 ENCOUNTER — TELEPHONE (OUTPATIENT)
Dept: NEUROLOGY | Facility: CLINIC | Age: 63
End: 2018-11-19

## 2018-11-19 NOTE — TELEPHONE ENCOUNTER
----- Message from Michelet Chaudhary sent at 11/19/2018 12:54 PM CST -----  Pharmacy Calling    Reason for call: Refill. Pls fax to .  Pharmacy Name: [facility]  Prescription Name: primidone (MYSOLINE) 50 MG Tab   Phone Number: [phone]  Additional Information:

## 2018-11-20 NOTE — TELEPHONE ENCOUNTER
Discontinuing primidone as per last clinic note.  Left message on pharmacy prescriber voicemail to discontinue primidone, will not authorize further refills.    Baldo Saul MD

## 2018-11-25 DIAGNOSIS — R56.9 SEIZURE: Primary | ICD-10-CM

## 2018-11-26 RX ORDER — LEVETIRACETAM 1000 MG/1
TABLET ORAL
Qty: 60 TABLET | Refills: 6 | Status: SHIPPED | OUTPATIENT
Start: 2018-11-26

## 2018-12-24 RX ORDER — GABAPENTIN 300 MG/1
300 CAPSULE ORAL NIGHTLY
Qty: 30 CAPSULE | Refills: 10 | Status: ON HOLD | OUTPATIENT
Start: 2018-12-24 | End: 2019-05-29 | Stop reason: HOSPADM

## 2018-12-24 NOTE — TELEPHONE ENCOUNTER
----- Message from Erika Fernandez sent at 12/24/2018  8:15 AM CST -----  Can the clinic reply in [facility]: no      Please refill the medication(s) listed below. Please call the patient when the prescription(s) is ready for  at this phone number   [phone]    Medication #1 #gabapentin (NEURONTIN) 300 MG capsule       Preferred Pharmacy: [facility]/pharmacy #[id number] - [place] [address]

## 2019-01-03 ENCOUNTER — HOSPITAL ENCOUNTER (OUTPATIENT)
Dept: CARDIOLOGY | Facility: OTHER | Age: 64
Discharge: HOME | End: 2019-01-03
Attending: INTERNAL MEDICINE
Payer: MEDICARE

## 2019-01-03 ENCOUNTER — OFFICE VISIT (OUTPATIENT)
Dept: INTERNAL MEDICINE | Facility: CLINIC | Age: 64
End: 2019-01-03
Payer: MEDICARE

## 2019-01-03 VITALS
HEIGHT: 65 IN | HEART RATE: 72 BPM | DIASTOLIC BLOOD PRESSURE: 84 MMHG | SYSTOLIC BLOOD PRESSURE: 120 MMHG | BODY MASS INDEX: 19.97 KG/M2

## 2019-01-03 DIAGNOSIS — R79.89 ELEVATED BRAIN NATRIURETIC PEPTIDE (BNP) LEVEL: ICD-10-CM

## 2019-01-03 DIAGNOSIS — R60.9 EDEMA, UNSPECIFIED TYPE: ICD-10-CM

## 2019-01-03 DIAGNOSIS — G21.9 SECONDARY PARKINSONISM, UNSPECIFIED SECONDARY PARKINSONISM TYPE: Primary | ICD-10-CM

## 2019-01-03 DIAGNOSIS — R42 VERTIGO, CONSTANT: ICD-10-CM

## 2019-01-03 DIAGNOSIS — R56.9 SEIZURE: ICD-10-CM

## 2019-01-03 DIAGNOSIS — F10.27 ALCOHOL DEPENDENCE WITH ALCOHOL-INDUCED PERSISTING DEMENTIA: ICD-10-CM

## 2019-01-03 DIAGNOSIS — F03.90 DEMENTIA WITHOUT BEHAVIORAL DISTURBANCE, UNSPECIFIED DEMENTIA TYPE: ICD-10-CM

## 2019-01-03 DIAGNOSIS — I10 ESSENTIAL HYPERTENSION, BENIGN: Chronic | ICD-10-CM

## 2019-01-03 DIAGNOSIS — F41.9 ANXIETY: ICD-10-CM

## 2019-01-03 PROCEDURE — 90686 FLU VACCINE (QUAD) GREATER THAN OR EQUAL TO 3YO PRESERVATIVE FREE IM: ICD-10-PCS | Mod: HCNC,S$GLB,, | Performed by: INTERNAL MEDICINE

## 2019-01-03 PROCEDURE — 99214 PR OFFICE/OUTPT VISIT, EST, LEVL IV, 30-39 MIN: ICD-10-PCS | Mod: 25,HCNC,S$GLB, | Performed by: INTERNAL MEDICINE

## 2019-01-03 PROCEDURE — 90686 IIV4 VACC NO PRSV 0.5 ML IM: CPT | Mod: HCNC,S$GLB,, | Performed by: INTERNAL MEDICINE

## 2019-01-03 PROCEDURE — 99214 OFFICE O/P EST MOD 30 MIN: CPT | Mod: 25,HCNC,S$GLB, | Performed by: INTERNAL MEDICINE

## 2019-01-03 PROCEDURE — 3079F DIAST BP 80-89 MM HG: CPT | Mod: CPTII,HCNC,S$GLB, | Performed by: INTERNAL MEDICINE

## 2019-01-03 PROCEDURE — G0008 FLU VACCINE (QUAD) GREATER THAN OR EQUAL TO 3YO PRESERVATIVE FREE IM: ICD-10-PCS | Mod: HCNC,S$GLB,, | Performed by: INTERNAL MEDICINE

## 2019-01-03 PROCEDURE — 3079F PR MOST RECENT DIASTOLIC BLOOD PRESSURE 80-89 MM HG: ICD-10-PCS | Mod: CPTII,HCNC,S$GLB, | Performed by: INTERNAL MEDICINE

## 2019-01-03 PROCEDURE — 3008F PR BODY MASS INDEX (BMI) DOCUMENTED: ICD-10-PCS | Mod: CPTII,HCNC,S$GLB, | Performed by: INTERNAL MEDICINE

## 2019-01-03 PROCEDURE — 3074F PR MOST RECENT SYSTOLIC BLOOD PRESSURE < 130 MM HG: ICD-10-PCS | Mod: CPTII,HCNC,S$GLB, | Performed by: INTERNAL MEDICINE

## 2019-01-03 PROCEDURE — 3074F SYST BP LT 130 MM HG: CPT | Mod: CPTII,HCNC,S$GLB, | Performed by: INTERNAL MEDICINE

## 2019-01-03 PROCEDURE — 99999 PR PBB SHADOW E&M-EST. PATIENT-LVL III: CPT | Mod: PBBFAC,HCNC,, | Performed by: INTERNAL MEDICINE

## 2019-01-03 PROCEDURE — 99999 PR PBB SHADOW E&M-EST. PATIENT-LVL III: ICD-10-PCS | Mod: PBBFAC,HCNC,, | Performed by: INTERNAL MEDICINE

## 2019-01-03 PROCEDURE — G0008 ADMIN INFLUENZA VIRUS VAC: HCPCS | Mod: HCNC,S$GLB,, | Performed by: INTERNAL MEDICINE

## 2019-01-03 PROCEDURE — 3008F BODY MASS INDEX DOCD: CPT | Mod: CPTII,HCNC,S$GLB, | Performed by: INTERNAL MEDICINE

## 2019-01-03 RX ORDER — POTASSIUM CHLORIDE 20 MEQ/1
20 TABLET, EXTENDED RELEASE ORAL DAILY PRN
Qty: 30 TABLET | Refills: 1 | Status: ON HOLD | OUTPATIENT
Start: 2019-01-03 | End: 2019-05-29 | Stop reason: HOSPADM

## 2019-01-03 RX ORDER — FUROSEMIDE 20 MG/1
20 TABLET ORAL DAILY PRN
Qty: 30 TABLET | Refills: 1 | Status: SHIPPED | OUTPATIENT
Start: 2019-01-03 | End: 2019-04-12

## 2019-01-03 NOTE — PROGRESS NOTES
"Patient was given vaccine information sheet for the Flu Vaccine. The area of injection was palpated using the acromion process as a landmark. This area was cleaned with alcohol. Using a 25g 1" safety needle, 0.5mL of the vaccine was placed into the right muscle. The injection site was dressed with a bandage. Patient experienced no complications and was discharged in stable condition. Fluzone vaccine Lot: [id number] Exp: 06/30/19  "

## 2019-01-03 NOTE — PROGRESS NOTES
Subjective:       Patient ID: Breonna Silva is a 63 y.o. female.    Chief Complaint: Dizziness    Pt here for f/u. She has parkinson's and is followed by neuro regularly. She has trouble remembering things and has a known dementia syndrome. She is living at assisted living facility where she says she administers her own medication. She is taking diazepam which says is for her tremor. She has not tolerated several meds in the past.      She has dx Sz disorder. However, sz history is questionable and its relation to etoh intake remains in question. She takes keppra. No sz recently.    She has chronic dizziness/vertigo that has previously been evaluated. Meclizine is Rx but she doesn't like to take it. She has not seen ENT. MRI brain previously unrevealing for cause.       She reports depression. She has been on cymbalta but didn't find effective so stopped. She was most recently on lexapro but finds it made her sleep walk so stopped. She saw psychiatry was on remeron but was stopped by neuro. No SI/HI.     She has chronic pain in R arm related to cervical radiculopathy. She has seen pain mgmt. She had injection which did help. She is on gabapentin but not sure this helps her.     Pt's BP is well controlled. Tolerating meds well. Pt denies cp/sob/ha/vision or neuro changes. Not checking at home.     She was in ED 3 mos ago for swelling in bilat feet. W/u revealed a mildly elevated BNP but pt declined admission so was discharged with a small supply of lasix which she said helped. Her swelling now comes and goes. Denies any sob/orthopnea/pnd.       Hypertension   Pertinent negatives include no chest pain, headaches or shortness of breath.     Review of Systems   Constitutional: Negative for fatigue and unexpected weight change.   Eyes: Negative for visual disturbance.   Respiratory: Negative for shortness of breath.    Cardiovascular: Negative for chest pain.   Gastrointestinal: Negative for abdominal pain and blood  in stool.   Neurological: Positive for dizziness. Negative for weakness and headaches.   Psychiatric/Behavioral: Positive for dysphoric mood.       Objective:      Physical Exam   Constitutional: She is oriented to person, place, and time. She appears well-developed and well-nourished.   Eyes: EOM are normal. Pupils are equal, round, and reactive to light.   Neck: Neck supple. No thyromegaly present.   Cardiovascular: Normal rate, regular rhythm and normal heart sounds.   Pulmonary/Chest: Effort normal and breath sounds normal.   Musculoskeletal: She exhibits no edema.   Lymphadenopathy:     She has no cervical adenopathy.   Neurological: She is alert and oriented to person, place, and time. She has normal strength. No cranial nerve deficit. Gait abnormal.   Wide unsteady gait   Psychiatric: Her affect is blunt. Her speech is delayed. She is slowed. Thought content is not paranoid. She expresses no suicidal ideation. She exhibits abnormal recent memory.       Assessment:       1. Secondary parkinsonism, unspecified secondary Parkinsonism type    2. Seizure    3. Alcohol dependence with alcohol-induced persisting dementia    4. Anxiety    5. Dementia without behavioral disturbance, unspecified dementia type    6. Essential hypertension, benign    7. Edema, unspecified type    8. Elevated brain natriuretic peptide (BNP) level    9. Vertigo, constant        Plan:       1. Keep f/u with specialists   2. Continue current meds  3. ECHO  4. ENT referral  5. Appropriate labs  6. Refill lasix to be taken prn edema; add potassium when taking lasix

## 2019-01-09 DIAGNOSIS — F41.9 ANXIETY: Primary | ICD-10-CM

## 2019-01-10 ENCOUNTER — TELEPHONE (OUTPATIENT)
Dept: INTERNAL MEDICINE | Facility: CLINIC | Age: 64
End: 2019-01-10

## 2019-01-10 RX ORDER — DIAZEPAM 2 MG/1
TABLET ORAL
Qty: 90 TABLET | Refills: 2 | Status: SHIPPED | OUTPATIENT
Start: 2019-01-10 | End: 2019-03-25 | Stop reason: SDUPTHER

## 2019-01-10 NOTE — TELEPHONE ENCOUNTER
----- Message from Shirlene Marroquin sent at 1/9/2019  3:33 PM CST -----  Contact: Pt   Name of Who is Calling: CHANTEL GOMEZ [[id number]]      What is the request in detail: Patient is requesting a call from staff in regards to advising her on what to take for the flu. The patient states due to her condition she can't take just anything. Please contact to further discuss and advise      Can the clinic reply by [facility]: No       What [facility]: [phone]

## 2019-01-18 ENCOUNTER — TELEPHONE (OUTPATIENT)
Dept: NEUROLOGY | Facility: CLINIC | Age: 64
End: 2019-01-18

## 2019-01-18 NOTE — TELEPHONE ENCOUNTER
Spoke to the patient to remind her the appointment on 1/22 was cancelled due to the provider not being in clinic. She was reschedule to 3/20.

## 2019-01-29 NOTE — TELEPHONE ENCOUNTER
Prescription from visit (mirtazipine) was sent to [facility].  Unable to contact patient right now, but will send drug to [facility].   No

## 2019-02-05 ENCOUNTER — OFFICE VISIT (OUTPATIENT)
Dept: OTOLARYNGOLOGY | Facility: CLINIC | Age: 64
End: 2019-02-05
Payer: MEDICARE

## 2019-02-05 VITALS
DIASTOLIC BLOOD PRESSURE: 80 MMHG | SYSTOLIC BLOOD PRESSURE: 123 MMHG | BODY MASS INDEX: 19.99 KG/M2 | WEIGHT: 120 LBS | HEIGHT: 65 IN | HEART RATE: 92 BPM | TEMPERATURE: 98 F

## 2019-02-05 DIAGNOSIS — R51.9 CHRONIC INTRACTABLE HEADACHE, UNSPECIFIED HEADACHE TYPE: ICD-10-CM

## 2019-02-05 DIAGNOSIS — R42 VERTIGO, CONSTANT: Primary | ICD-10-CM

## 2019-02-05 DIAGNOSIS — J34.2 NASAL SEPTAL DEVIATION: ICD-10-CM

## 2019-02-05 DIAGNOSIS — H61.23 BILATERAL IMPACTED CERUMEN: ICD-10-CM

## 2019-02-05 DIAGNOSIS — G89.29 CHRONIC INTRACTABLE HEADACHE, UNSPECIFIED HEADACHE TYPE: ICD-10-CM

## 2019-02-05 DIAGNOSIS — H93.293 ABNORMAL AUDITORY PERCEPTION OF BOTH EARS: ICD-10-CM

## 2019-02-05 DIAGNOSIS — J30.9 ALLERGIC RHINITIS, UNSPECIFIED SEASONALITY, UNSPECIFIED TRIGGER: ICD-10-CM

## 2019-02-05 PROCEDURE — 99204 OFFICE O/P NEW MOD 45 MIN: CPT | Mod: 25,S$GLB,, | Performed by: SPECIALIST

## 2019-02-05 PROCEDURE — 3008F PR BODY MASS INDEX (BMI) DOCUMENTED: ICD-10-PCS | Mod: CPTII,S$GLB,, | Performed by: SPECIALIST

## 2019-02-05 PROCEDURE — 69210 EAR CERUMEN REMOVAL: ICD-10-PCS | Mod: S$GLB,,, | Performed by: SPECIALIST

## 2019-02-05 PROCEDURE — 69210 REMOVE IMPACTED EAR WAX UNI: CPT | Mod: S$GLB,,, | Performed by: SPECIALIST

## 2019-02-05 PROCEDURE — 99204 PR OFFICE/OUTPT VISIT, NEW, LEVL IV, 45-59 MIN: ICD-10-PCS | Mod: 25,S$GLB,, | Performed by: SPECIALIST

## 2019-02-05 PROCEDURE — 3008F BODY MASS INDEX DOCD: CPT | Mod: CPTII,S$GLB,, | Performed by: SPECIALIST

## 2019-02-05 RX ORDER — FLUTICASONE PROPIONATE 50 MCG
2 SPRAY, SUSPENSION (ML) NASAL DAILY
Qty: 1 BOTTLE | Refills: 11 | Status: SHIPPED | OUTPATIENT
Start: 2019-02-05 | End: 2019-04-12

## 2019-02-05 NOTE — PATIENT INSTRUCTIONS
Dizziness (Vertigo) and Balance Problems: Diagnostic Tests    An otolaryngologist (also called an ENT) is a doctor who specializes in disorders of the ear, nose, and throat. Your ENT can help find clues to the cause of your dizziness. He or she will examine you and go over your health history. Your ENT may also order certain tests to help diagnose your problem.  Hearing testing  In most cases, you will be referred for hearing testing. This is because the nerve that sends balance signals also sends hearing signals. A problem that affects balance can also affect hearing.  Other tests  Your doctor may recommend more than one kind of test. The following tests are painless, but may cause dizziness in some cases.  · MRI creates images of the ear or head. A magnetic field and contrast medium are used to make the image.  · Electronystagmography (ENG) records eye movement. Small electrodes are put on the skin around your eyes. Then your ear is filled with warm or cold water.  · Rotation tests show the relationship between the inner ear and your eyes. You may be asked to wear special goggles or sit in a computerized chair.  · Posturography tests your standing balance under different conditions. You will stand on a platform that measures shifts in your body weight.   · Electrocochleography (ECoG) measures the fluid pressure in the inner ear. An abnormal ECoG may mean you have Meniere's disease or other conditions.  · Vestibular evoked myogenic potentials (VEMPs) may be used if your healthcare provider suspects a rare condition like superior semicircular canal dehiscence. Electrodes are placed on your neck, and you hear clicks in your ear.  Date Last Reviewed: 11/1/2016 © 2000-2017 [employer] [address] [place] [address], [place] [address]. All rights reserved. This information is not intended as a substitute for professional medical care. Always follow your healthcare professional's instructions.

## 2019-02-05 NOTE — PROCEDURES
"Ear Cerumen Removal  Date/Time: 2/5/2019 2:28 PM  Performed by: RUTH Huitron MD  Authorized by: RUTH Huitron MD     Time out: Immediately prior to procedure a "time out" was called to verify the correct patient, procedure, equipment, support staff and site/side marked as required.    Consent Done?:  Yes (Verbal)    Local anesthetic:  None  Location details:  Both ears  Procedure type comment:  Suction and bayonet forceps  Cerumen  Removal Results:  Cerumen completely removed  Patient tolerance:  Patient tolerated the procedure well with no immediate complications        "

## 2019-02-06 ENCOUNTER — TELEPHONE (OUTPATIENT)
Dept: OTOLARYNGOLOGY | Facility: CLINIC | Age: 64
End: 2019-02-06

## 2019-02-06 NOTE — TELEPHONE ENCOUNTER
Contacted pt today and spoke with her letting her know Dr. Huitron is out today and we cannot help her get medicine for pain. However, he will be back on tomorrow 2/7/19 and Dr Huitron will get this message on his return to clinic tomorrow.   Patient states she would like something for pain in her left ear, please send to [facility] pharmacy.

## 2019-02-06 NOTE — TELEPHONE ENCOUNTER
"----- Message from Mary Ellen Benton sent at 2/6/2019  1:54 PM CST -----  Contact: CHANTEL GOMEZ [[id number]]  Name of Who is Calling:  CHANTEL GOMEZ [[id number]]      What is the request in detail:  Patient called requesting a call.  Says, "she's experiencing left ear pain."   Please give a call back at earliest convenience.    THANKS!      Can the clinic reply by [facility]:   NO      Number to Call Back:  CHANTEL GOMEZ  /  # [phone]                                    "

## 2019-02-06 NOTE — PROGRESS NOTES
Subjective:       Patient ID: Breonna Silva is a 63 y.o. female.    Chief Complaint: Ear Wax (with Dizziness and Headaches)    The patient is coming in to discuss multiple items:   1.  She recently was administered in influenza vaccination which resulted her and having a flu-like syndrome.  She has had pain in the left  Side of her face, cheek, and ear since.  She has blockage in her left ear.  2.  She has chronic severe imbalance.  The imbalance was made significantly worse by the influenza vaccination.  3.  She has had parkinsonism for the last 3 and half years.  Effexor speech and swallowing  in addition to her motor function.  4.  She has chronic frontal headaches.  5.  She has complex regional pain syndrome of the right upper extremity that results and chronic pain in her extremity.  6.  From the very beginning of our interview she in for size several times her fear of an aversion to pain.  She also limited on several occasions about the number pills she has to take.  She has wax in her left ear and is extremely concerned about having it removed.      Review of Systems   Constitutional: Positive for fatigue. Negative for activity change, appetite change, chills, fever and unexpected weight change.   HENT: Positive for congestion, ear pain, hearing loss, postnasal drip, rhinorrhea, sore throat, trouble swallowing and voice change. Negative for ear discharge, facial swelling, mouth sores, sinus pressure, sinus pain, sneezing and tinnitus.    Eyes: Positive for pain. Negative for photophobia, discharge, redness, itching and visual disturbance.   Respiratory: Positive for cough. Negative for apnea, choking, shortness of breath and wheezing.    Cardiovascular: Negative for chest pain and palpitations.   Gastrointestinal: Negative for abdominal distention, abdominal pain, nausea and vomiting.   Musculoskeletal: Negative for arthralgias, myalgias, neck pain and neck stiffness.   Skin: Negative.  Negative for color  change, pallor and rash.   Allergic/Immunologic: Negative for environmental allergies, food allergies and immunocompromised state.   Neurological: Positive for dizziness, light-headedness and headaches. Negative for facial asymmetry, speech difficulty, weakness and numbness.   Hematological: Negative for adenopathy. Does not bruise/bleed easily.   Psychiatric/Behavioral: Negative for confusion, decreased concentration and sleep disturbance.       Objective:      Physical Exam   Constitutional: She is oriented to person, place, and time. She appears well-developed and well-nourished. She is cooperative.   HENT:   Head: Normocephalic.   Right Ear: External ear and ear canal normal. No drainage (Hard wax blocks ear canal). Tympanic membrane is retracted.   Left Ear: External ear and ear canal normal. No drainage ( hard wax blocks the ear canal). Tympanic membrane is retracted.   Nose: Mucosal edema (cyanotic, boggy inferior turbinates bilaterally), rhinorrhea (clear mucus bilaterally) and septal deviation ( to the right) present.   Mouth/Throat: Uvula is midline, oropharynx is clear and moist and mucous membranes are normal. No oral lesions.   Voice-multiple pitch breaks suggesting spastic dysphonia   Eyes: EOM and lids are normal. Pupils are equal, round, and reactive to light. Right eye exhibits no discharge and no exudate. Left eye exhibits no discharge and no exudate. Right conjunctiva is injected. Left conjunctiva is injected.   Neck: Trachea normal and normal range of motion. No muscular tenderness present. No tracheal deviation present. No thyroid mass and no thyromegaly present.   Cardiovascular: Normal rate, regular rhythm, normal heart sounds and normal pulses.   Pulmonary/Chest: Effort normal and breath sounds normal. No stridor. She has no decreased breath sounds. She has no wheezes. She has no rhonchi. She has no rales.   Abdominal: Soft. Bowel sounds are normal. There is no tenderness.   Musculoskeletal:  Normal range of motion.   Lymphadenopathy:        Head (right side): No submental, no submandibular, no preauricular, no posterior auricular and no occipital adenopathy present.        Head (left side): No submental, no submandibular, no preauricular, no posterior auricular and no occipital adenopathy present.     She has no cervical adenopathy.   Neurological: She is alert and oriented to person, place, and time. She has normal strength. No cranial nerve deficit or sensory deficit. Coordination ( mild rigidity and spasticity of muscles) and gait ( walks with a cane) abnormal.   Neuro otologic-cranial nerves intact, no nystagmus, patient uses a cane to assist in ambulation, unable to adequately assess gait or Romberg     Skin: Skin is warm and dry. No petechiae and no rash noted. No cyanosis. Nails show no clubbing.   Psychiatric: Judgment and thought content normal. Her mood appears anxious. Her speech is delayed. She is agitated and slowed. Cognition and memory are normal.       Procedure:  Cerumen is removed from both ears using an 8 Nepali suction and bayonet forceps.  The patient tolerated the procedure well      Assessment:       1. Vertigo, constant    2. Abnormal auditory perception of both ears    3. Bilateral impacted cerumen    4. Allergic rhinitis, unspecified seasonality, unspecified trigger    5. Nasal septal deviation    6. Chronic intractable headache, unspecified headache type        Plan:       I will schedule the patient for an audio vestibular evaluation and recheck her when I have those results.  I am sending an appointment for 2 weeks.

## 2019-02-07 RX ORDER — NEOMYCIN SULFATE, POLYMYXIN B SULFATE, HYDROCORTISONE 3.5; 10000; 1 MG/ML; [USP'U]/ML; MG/ML
SOLUTION/ DROPS AURICULAR (OTIC)
Qty: 1 BOTTLE | Refills: 3 | Status: ON HOLD | OUTPATIENT
Start: 2019-02-07 | End: 2019-04-25

## 2019-02-07 NOTE — TELEPHONE ENCOUNTER
Contacted patient and spoke with her letting her know per Dr. Huitron prescribed her Ear drop and sent electronically to your pharmacy. Patient states she hasn't gone to get her medicine yet but thanks.

## 2019-02-14 ENCOUNTER — TELEPHONE (OUTPATIENT)
Dept: OTOLARYNGOLOGY | Facility: CLINIC | Age: 64
End: 2019-02-14

## 2019-02-14 NOTE — TELEPHONE ENCOUNTER
----- Message from Erika Fernandez sent at 2/14/2019  8:29 AM CST -----  Name of Who is Calling: PALMERCHANTEL LARSON [[id number]]    What is the request in detail: Pt states she has pain from her neck to her head/ear and tongue. Please call asap. Pt states she calls the staff she has to keep calling before she can get help.       Can the clinic reply by [facility]:    No      What Number to Call Back if not in [facility]: [phone]

## 2019-02-14 NOTE — TELEPHONE ENCOUNTER
----- Message from Shirlene Marroquin sent at 2/14/2019  2:42 PM CST -----  Contact: Pt     Who Called: CHANTEL GOMEZ [[id number]]    Who Left Message for Patient: Moni     Does the patient know what this is regarding? Pain in her neck     Best Call Back Number:[phone]    Additional Information:

## 2019-02-14 NOTE — TELEPHONE ENCOUNTER
----- Message from Angelica Pardo sent at 2/14/2019 10:56 AM CST -----  Contact: Pt   Name of Who is Calling: CHANTEL GOMEZ [[id number]]    What is the request in detail: Pt is retuning Moni's call in regards to the pain in her neck. Please call to further discuss and advise.     Can the clinic reply by [facility]: No     What Number to Call Back if not in [facility]: [phone]

## 2019-02-14 NOTE — TELEPHONE ENCOUNTER
LM on  to call office to schedule an appointment.    ----- Message from Mason Keys sent at 2/14/2019 10:45 AM CST -----  Contact: CHANTEL GOMEZ [[id number]]  Type:  Patient Returning Call    Who Called: CHANTEL GOMEZ [[id number]]    Who Left Message for Patient: Moni    Does the patient know what this is regarding?: yes    Best Call Back Number: [phone]    Additional Information:

## 2019-02-15 DIAGNOSIS — Z12.39 BREAST CANCER SCREENING: ICD-10-CM

## 2019-02-27 ENCOUNTER — TELEPHONE (OUTPATIENT)
Dept: OTOLARYNGOLOGY | Facility: CLINIC | Age: 64
End: 2019-02-27

## 2019-02-27 NOTE — TELEPHONE ENCOUNTER
----- Message from Naima Nobles sent at 2/27/2019  7:45 AM CST -----  Contact: pt  Name of Who is Calling: Breonna      What is the request in detail: requesting a call back  In reference to a medication that she states that Dr Huitron has her on is very hurtful. Pt states that she has been calling about this issue and she is receiving calls back but they are hanging up after the first ring. I informed the pt that Tomy is out and they I can try and get her in with one of his partners, pt states that she may go to the ER. Pt states the medicines is making her left side of her head from her ear to her throat hurt and feels like a burning feeling.  Please call and advise      Can the clinic reply by [facility]: no    What Number to Call Back if not in [facility]: [phone]

## 2019-02-27 NOTE — TELEPHONE ENCOUNTER
Contacted patient and spoke with her today about painful ear when applying ear drops. Also, the nasal spray has worked for one day. Patient was added on the schedule for tomorrow.

## 2019-02-28 ENCOUNTER — TELEPHONE (OUTPATIENT)
Dept: OTOLARYNGOLOGY | Facility: CLINIC | Age: 64
End: 2019-02-28

## 2019-02-28 NOTE — TELEPHONE ENCOUNTER
Returning phone call. LM on  to call office    ----- Message from Norma Berumen sent at 2/28/2019  3:38 PM CST -----  Contact: CHANTEL GOMEZ [[id number]]  Type:  Patient Returning Call    Who Called: CHANTEL GOMEZ [[id number]]    Who Left Message for Patient: Stacey     Does the patient know what this is regarding?:yes    Best Call Back Number:[phone]    Additional Information:

## 2019-02-28 NOTE — TELEPHONE ENCOUNTER
LM on  to call office to schedule appointment    ----- Message from Shirlene Marroquin sent at 2/28/2019 10:56 AM CST -----  Contact: pt   Name of Who is Calling: CHANTEL GOMEZ [[id number]]      What is the request in detail: Patient states she can't make her appt due to a fall she had this morning, requesting staff contact her before rescheduling. Please contact to further discuss and advise      Can the clinic reply by [facility]: No       What Number to Call Back if not in [facility]: [phone]

## 2019-03-04 ENCOUNTER — TELEPHONE (OUTPATIENT)
Dept: OTOLARYNGOLOGY | Facility: CLINIC | Age: 64
End: 2019-03-04

## 2019-03-04 ENCOUNTER — CLINICAL SUPPORT (OUTPATIENT)
Dept: AUDIOLOGY | Facility: CLINIC | Age: 64
End: 2019-03-04
Payer: MEDICARE

## 2019-03-04 DIAGNOSIS — H90.A21 SENSORINEURAL HEARING LOSS (SNHL) OF RIGHT EAR WITH RESTRICTED HEARING OF LEFT EAR: ICD-10-CM

## 2019-03-04 DIAGNOSIS — H90.5 HIGH FREQUENCY SENSORINEURAL HEARING LOSS OF LEFT EAR: Primary | ICD-10-CM

## 2019-03-04 DIAGNOSIS — H81.8X2 UNILATERAL VESTIBULAR WEAKNESS, LEFT: Primary | ICD-10-CM

## 2019-03-04 PROCEDURE — 92557 PR COMPREHENSIVE HEARING TEST: ICD-10-PCS | Mod: HCNC,S$GLB,, | Performed by: AUDIOLOGIST

## 2019-03-04 PROCEDURE — 92537 CALORIC VSTBLR TEST W/REC: CPT | Mod: HCNC,S$GLB,, | Performed by: AUDIOLOGIST

## 2019-03-04 PROCEDURE — 92567 PR TYMPA2METRY: ICD-10-PCS | Mod: HCNC,S$GLB,, | Performed by: AUDIOLOGIST

## 2019-03-04 PROCEDURE — 92540 PR VESTIBULAR EVAL NYSTAG FOVL&PERPH STIM OSCIL TRACKING: ICD-10-PCS | Mod: HCNC,S$GLB,, | Performed by: AUDIOLOGIST

## 2019-03-04 PROCEDURE — 92557 COMPREHENSIVE HEARING TEST: CPT | Mod: HCNC,S$GLB,, | Performed by: AUDIOLOGIST

## 2019-03-04 PROCEDURE — 92537 PR CALORIC VSTBLR TEST W/REC BITHERMAL: ICD-10-PCS | Mod: HCNC,S$GLB,, | Performed by: AUDIOLOGIST

## 2019-03-04 PROCEDURE — 92540 BASIC VESTIBULAR EVALUATION: CPT | Mod: HCNC,S$GLB,, | Performed by: AUDIOLOGIST

## 2019-03-04 PROCEDURE — 92567 TYMPANOMETRY: CPT | Mod: HCNC,S$GLB,, | Performed by: AUDIOLOGIST

## 2019-03-04 NOTE — PROGRESS NOTES
"Ms. Ravi "Capri" Ricardo was seen in the clinic today for an audiological evaluation.   Ms. Silva reported that she has been experiencing vertigo since she was diagnosed with Parkinson's approximately four years ago.    Audiological testing revealed a mild to moderately-severe sensorineural hearing loss of the right ear and a mild to moderate high frequency sensorineural hearing loss of the left ear.  A speech reception threshold was obtained at 30 dBHL for the right ear and at 25 dBHL for the left ear.  Speech discrimination was 96% for the right ear and 100% for the left ear.      Tympanometry testing revealed Type A tympanograms, bilaterally.  Ipsilateral acoustic reflex testing was attempted but results could not be obtained due to inability to maintain a seal.    Recommendations:  1. Otologic evaluation  2. Annual audiological evaluation  3. Hearing protection when in noise   4. Hearing aid consultation for the right ear following medical clearance--Ms. Silva stated she is not interested in hearing aids at this time        "

## 2019-03-04 NOTE — Clinical Note
Dr. Huitron, Please find your patient's VNG results attached. Please let me know if I can provide any additional information. The patient requested that I inform you that prior to and at today's appointment she has been experiencing severe pain in her left ear, side of her face and jaw. She still elected to proceed with calorics. Best regards, Chuy Joseph, CCC-A

## 2019-03-04 NOTE — PROGRESS NOTES
VNG Evaluation    Referring physician:  Dr. Huitron    63 y.o. female complains of vertigo, dizziness, imbalance, lightheadedness, headaches, nausea, vomiting and multiple falls. The patient notes that at the beginning of today's appointment she has severe throbbing and pain in her left ear. Symptoms have been occurring constantly since the patient was diagnosed with Parkinson's disease approximately 4 years ago.     Gaze nystagmus was absent.  Oculomotor function was abnormal: long left saccadic latencies.   Spontaneous nystagmus was absent.  The head-hanging left Hallpike revealed <clinically insignificant> nystagmus: 4 d/s up-beating in the supine position.  The head-hanging right Hallpike was negative.  Static positional testing revealed <clinically insignificant> nystagmus: 4 d/s up-beating in the head center position.    Particularly due to patient's complaints of left ear pain, caloric procedure and purpose was described in great detail. She verbally consented to proceed with testing.  Unilateral weakness: 35% (left)  Directional preponderance: 15% (right-beating)  RC: 13 d/s   d/s  RW: 14 d/s  LW: 4 d/s  Fixation suppression was normal.    Impression: VNG findings suggest a left peripheral vestibular abnormality that is incompletely compensated. The significance of abnormal saccadic latencies in isolation is unknown.     Recommend: 1. A trial with Cawthorne exercises. A copy of these exercises was provided for the patient at today's appointment. 2. A formal Risk of Falls assessment and formal vestibular/balance rehab.

## 2019-03-04 NOTE — TELEPHONE ENCOUNTER
Returned call. LM on      ----- Message from Naima Carvajal sent at 3/4/2019  2:18 PM CST -----  Contact: CHANTEL   Name of Who is Calling: CHANTEL       What is the request in detail: Patient is requesting a call back she states she needs something for pain to be called in she states she took the test today and she still having sever pain she states she has to take  A taxi home so she needs something called in asap      Can the clinic reply by [facility]: no      What Number to Call Back if not in [facility]: [phone]

## 2019-03-06 ENCOUNTER — TELEPHONE (OUTPATIENT)
Dept: OTOLARYNGOLOGY | Facility: CLINIC | Age: 64
End: 2019-03-06

## 2019-03-06 NOTE — TELEPHONE ENCOUNTER
----- Message from Angelica Pardo sent at 3/6/2019  8:14 AM CST -----  Contact: Pt  Name of Who is Calling: CHANTEL GOMEZ [[id number]]    What is the request in detail: Pt called to check the status of her VNG results. States she was suppose to get a prescription and it has not been sent. Please call to further discuss and advise.     Can the clinic reply by [facility]: No     What Number to Call Back if not in [facility]: [phone]

## 2019-03-08 ENCOUNTER — TELEPHONE (OUTPATIENT)
Dept: OTOLARYNGOLOGY | Facility: CLINIC | Age: 64
End: 2019-03-08

## 2019-03-08 NOTE — TELEPHONE ENCOUNTER
Patient is aware doctor is out for the day but, would like something today for pain, please. Pharmacy to send meds.   [id number] - [place], [place] - [facility] [phone] (Phone)  [phone] (Fax)       ----- Message from Naima Carvajal sent at 3/8/2019  9:14 AM CST -----  Contact: david  Name of Who is Calling: david       What is the request in detail: Patient is requesting a call back concerning her VNG and she states she is still in pain and wanted to see if some medication cam be called in for her today       Can the clinic reply by [facility]: no      What Number to Call Back if not in [facility]: [phone]

## 2019-03-09 RX ORDER — KETOROLAC TROMETHAMINE 10 MG/1
10 TABLET, FILM COATED ORAL EVERY 6 HOURS
Qty: 20 TABLET | Refills: 0 | Status: ON HOLD | OUTPATIENT
Start: 2019-03-09 | End: 2019-04-13

## 2019-03-10 ENCOUNTER — NURSE TRIAGE (OUTPATIENT)
Dept: ADMINISTRATIVE | Facility: CLINIC | Age: 64
End: 2019-03-10

## 2019-03-10 NOTE — TELEPHONE ENCOUNTER
Reason for Disposition   Caller has medication question, adult has minor symptoms, caller declines triage, and triager answers question    Protocols used: MEDICATION QUESTION CALL-A-    Pt states she has seen ENT for vertigo and continues with ear pain. Pt states she attempted to contact the office last week and hasn't received call back and is requesting something for ear pain. Pt notified per chart, ketorolac has been called into pt pharmacy. Pt verbalizes understanding and declines triage for complaint.

## 2019-03-11 ENCOUNTER — TELEPHONE (OUTPATIENT)
Dept: OTOLARYNGOLOGY | Facility: CLINIC | Age: 64
End: 2019-03-11

## 2019-03-11 NOTE — TELEPHONE ENCOUNTER
Attempted to call pt today letting her know per Dr. Huitron states he electronically sent her  ketorilac to her pharmacy on file.

## 2019-03-15 ENCOUNTER — TELEPHONE (OUTPATIENT)
Dept: OTOLARYNGOLOGY | Facility: CLINIC | Age: 64
End: 2019-03-15

## 2019-03-15 NOTE — TELEPHONE ENCOUNTER
----- Message from Arianna Saul sent at 3/14/2019  7:02 PM CDT -----  Contact: Pt   Pt is requesting a call back In regards to rescheduling her appt to a later time.       Pt can be contacted at [phone]

## 2019-03-18 ENCOUNTER — TELEPHONE (OUTPATIENT)
Dept: INTERNAL MEDICINE | Facility: CLINIC | Age: 64
End: 2019-03-18

## 2019-03-18 ENCOUNTER — TELEPHONE (OUTPATIENT)
Dept: OTOLARYNGOLOGY | Facility: CLINIC | Age: 64
End: 2019-03-18

## 2019-03-18 NOTE — TELEPHONE ENCOUNTER
She will need to f/u with Dr. Huitron when he returns as there are no alternatives that I am aware of. She can come in for appt if she prefers.

## 2019-03-18 NOTE — TELEPHONE ENCOUNTER
Called pt to inform her that Dr. Huitron would like her to come in for a follow up evaluation appt. RANJIT on VM     Called pt to inform her that her medication request refill was received and will be sent to Dr. Huitron but he is out of the office until Monday 3/25/19.    ----- Message from Petra Patel sent at 3/18/2019  8:15 AM CDT -----  Contact: Self      Can the clinic reply in [facility]:  N      Please refill the medication(s) listed below. Please call the patient when the prescription(s) is ready for  at this phone number  [phone]. Pt is having severe ear pain and would like the medication today.     Medication #1 ketorolac (TORADOL) 10 mg tablet    Medication #2       [facility] [phone] fax [phone]

## 2019-03-18 NOTE — TELEPHONE ENCOUNTER
----- Message from Angelica Pardo sent at 3/18/2019  9:32 AM CDT -----  Contact: Pt   Name of Who is Calling: CHANTEL GOMEZ [[id number]]    What is the request in detail: Pt is requesting to speak with the nurse in regards to issues with her left ear pounding. Pt states she spoke with  About Vertigo in the past. Please call to further discuss and advise.     Can the clinic reply by [facility]: No     What [facility]: [phone]

## 2019-03-19 NOTE — TELEPHONE ENCOUNTER
Message left on Vm to f/u with Dr Huitron when he returns, no other alternatives or she could call office to f/u with Dr Del Valle.

## 2019-03-20 ENCOUNTER — OFFICE VISIT (OUTPATIENT)
Dept: NEUROLOGY | Facility: CLINIC | Age: 64
End: 2019-03-20
Payer: MEDICARE

## 2019-03-20 VITALS
WEIGHT: 120 LBS | HEIGHT: 65 IN | HEART RATE: 83 BPM | DIASTOLIC BLOOD PRESSURE: 101 MMHG | BODY MASS INDEX: 19.99 KG/M2 | SYSTOLIC BLOOD PRESSURE: 169 MMHG

## 2019-03-20 DIAGNOSIS — G21.9 SECONDARY PARKINSONISM, UNSPECIFIED SECONDARY PARKINSONISM TYPE: ICD-10-CM

## 2019-03-20 DIAGNOSIS — R29.810 FACIAL DROOP: ICD-10-CM

## 2019-03-20 DIAGNOSIS — F10.27 ALCOHOL DEPENDENCE WITH ALCOHOL-INDUCED PERSISTING DEMENTIA: ICD-10-CM

## 2019-03-20 DIAGNOSIS — G47.00 INSOMNIA, UNSPECIFIED TYPE: ICD-10-CM

## 2019-03-20 DIAGNOSIS — F03.90 DEMENTIA WITHOUT BEHAVIORAL DISTURBANCE, UNSPECIFIED DEMENTIA TYPE: ICD-10-CM

## 2019-03-20 DIAGNOSIS — R56.9 SEIZURE: ICD-10-CM

## 2019-03-20 DIAGNOSIS — Z79.899 CHRONIC PRESCRIPTION BENZODIAZEPINE USE: ICD-10-CM

## 2019-03-20 DIAGNOSIS — H92.02 EAR PAIN, LEFT: Primary | ICD-10-CM

## 2019-03-20 PROCEDURE — 3077F SYST BP >= 140 MM HG: CPT | Mod: HCNC,CPTII,GC,S$GLB | Performed by: STUDENT IN AN ORGANIZED HEALTH CARE EDUCATION/TRAINING PROGRAM

## 2019-03-20 PROCEDURE — 99215 PR OFFICE/OUTPT VISIT, EST, LEVL V, 40-54 MIN: ICD-10-PCS | Mod: HCNC,GC,S$GLB, | Performed by: STUDENT IN AN ORGANIZED HEALTH CARE EDUCATION/TRAINING PROGRAM

## 2019-03-20 PROCEDURE — 3008F BODY MASS INDEX DOCD: CPT | Mod: HCNC,CPTII,GC,S$GLB | Performed by: STUDENT IN AN ORGANIZED HEALTH CARE EDUCATION/TRAINING PROGRAM

## 2019-03-20 PROCEDURE — 3080F DIAST BP >= 90 MM HG: CPT | Mod: HCNC,CPTII,GC,S$GLB | Performed by: STUDENT IN AN ORGANIZED HEALTH CARE EDUCATION/TRAINING PROGRAM

## 2019-03-20 PROCEDURE — 3077F PR MOST RECENT SYSTOLIC BLOOD PRESSURE >= 140 MM HG: ICD-10-PCS | Mod: HCNC,CPTII,GC,S$GLB | Performed by: STUDENT IN AN ORGANIZED HEALTH CARE EDUCATION/TRAINING PROGRAM

## 2019-03-20 PROCEDURE — 3008F PR BODY MASS INDEX (BMI) DOCUMENTED: ICD-10-PCS | Mod: HCNC,CPTII,GC,S$GLB | Performed by: STUDENT IN AN ORGANIZED HEALTH CARE EDUCATION/TRAINING PROGRAM

## 2019-03-20 PROCEDURE — 99999 PR PBB SHADOW E&M-EST. PATIENT-LVL IV: CPT | Mod: PBBFAC,HCNC,GC, | Performed by: STUDENT IN AN ORGANIZED HEALTH CARE EDUCATION/TRAINING PROGRAM

## 2019-03-20 PROCEDURE — 99999 PR PBB SHADOW E&M-EST. PATIENT-LVL IV: ICD-10-PCS | Mod: PBBFAC,HCNC,GC, | Performed by: STUDENT IN AN ORGANIZED HEALTH CARE EDUCATION/TRAINING PROGRAM

## 2019-03-20 PROCEDURE — 99215 OFFICE O/P EST HI 40 MIN: CPT | Mod: HCNC,GC,S$GLB, | Performed by: STUDENT IN AN ORGANIZED HEALTH CARE EDUCATION/TRAINING PROGRAM

## 2019-03-20 PROCEDURE — 3080F PR MOST RECENT DIASTOLIC BLOOD PRESSURE >= 90 MM HG: ICD-10-PCS | Mod: HCNC,CPTII,GC,S$GLB | Performed by: STUDENT IN AN ORGANIZED HEALTH CARE EDUCATION/TRAINING PROGRAM

## 2019-03-20 NOTE — PROGRESS NOTES
"Neurology Clinic  Follow-up Visit    Patient Name: Breonna Silva  MRN: [id number]    CC: dementia, parkinson's, ear pain    HPI: Breonna Silva is a 63 y.o. female w/ PMH significant for depression/anxiety, dementia, seizures, parkinson's disease presenting as follow-up.  Pt has previously been seen by Lindsay Collazo, and Jose.     3/20/19 Interval History  Today pt presents with complaints of L ear/face/head pain and this is the focus of today's visit.  She states that it began 1 day after cerum deimpaction performed by ENT.  She describes it as a "constant pain."  Specifically denies that it is a stabbing pain when queried, but then mimes a stabbing motion with her hands when asked to describe it otherwise.  Denies any numbness, rashes, changes in hearing.  Worse when lying on L side to sleep and difficulty swallowing (unclear relation to pain - cannot determine if swallowing worsens pain or not).  States the pain centers on her eardrum.  Currently taking tylenol which helps, but does not get rid of pain.  Was given toradol by ENT which helped.      No prior similar pains nor trauma.  No fevers, discharge from ear (excluding cerumen and deimpaction).      No seizures since last visit.  Continues on keppra, valium.  Continues to drink frequently (2 drinks of vodka), but does not provide a frequency.      Endorses difficulty falling asleep.      11/13/18 Interval History  LLE>RLE edema, "blew me up" with primidone.  Seen in ED on 10/17, started on lasix (20 mg Qdaily) with subsequent improvement.  Urine culture resulted with E coli, took antibiotics (completed yesterday, per pt).      Now off primidone and mirtazapine.       Continues to report dizziness: constant, morning/noon/night.  Feels like head is a "big old cottonball."  Worse with standing.  No improvement with valium, but feels that alcohol helps her dizziness (couple of drinks/week).  Not necessarily worse with shaking head. Not taking meclizine. " " Not better/worse with lasix dose.    She continues to report abnormal episodes of what she is attributing to seizures (feels "off" and "stiff"), but is unable to further clarify a frequency.  Continues to take keppra.    Continues to frequently drink at local bar, unable to specify frequency.    Denies active SIHI at this time.    Initial History  She is a poor historian, displays tangential thought processes.  At times, she is difficult to redirect.  She reports a history of 3.5 years of difficulty with ambulation.  Describes difficulty getting up, notes she bumps into walls when going to the bathroom.  With regards to dizziness, she notes she fell getting out of bed 2-4 months ago and hit her nose.  She is worried about breaking antiques in her living space.  Doesn't drive secondary to dizziness.    Describes a history of seizures, for which she takes keppra.  States last seizure was 1 week ago.  Describes seizures as shaking b/l, during which she maintains awareness.  Denies urinary/fecal incontinence, tongue biting.    States she feels like the "baddest bitch" at times.  States she feels as though she can say anything she wants because she is older than 60, but doesn't want to be that way.  States her memory is "fine."    She lives in an assisted living facility.  Initially [facility] name [facility] but I believe the correct name [facility] (internet search).    Previously worked as a  and taught physical fitness.  Drinks 1 glass of vodka per day, states she wishes she could drink a bottle a day.  No cigarettes, recreational drug use.  Currently taking valium TID for her RUE, states she can't move it like she used to, which she attributes to stiffness.  Ongoing x3.5 years.  In general, R>L.      FMH notable for mother who  of breast cancer at 31 yo.  No other family members with known history of similar dementia/shaking difficulties.      Thinks about suicide "all " "the time" because she is so tired being "dizzy", "sick", and "needing help to walk."  States she would like to drink a bottle of drano and 20/20.  Asks if this would be painless, states she would like a method that is painless.  Doesn't use a cane, although she has one.  During interview, states she will not hurt herself in the future, does not have current intention to pursue self-harm.      During her last evaluation with Dr. Martini, where workup revealed elevated antigliadin Ig.  Pt states she eats a lot of pasta, but experiences a significant amount of bloating with pasta.        Review of Systems:  General: No fevers, chills  Eyes: No changes in vision  ENT: No changes in hearing  Respiratory: No SOB  CV: No chest pain, palpitations.   GI: No diarrhea, blood in stool  Urinary: No dysuria, hematuria  Skin: No rashes  Neurological: +weakness, confusion.   Psychiatric: No auditory nor visual hallucinations.  -SIHI.      Past Medical History  Past Medical History:   Diagnosis Date    Anxiety     Dementia     Depression      of psychiatric care     Hypertension     Psychiatric problem     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    Seizures     Therapy     Transient loss of consciousness 9/28/2012    presumed seizures       Medications    Current Outpatient Medications:     b complex vitamins tablet, Take 1 tablet by mouth once daily., Disp: , Rfl:     cyanocobalamin (VITAMIN B-12) 1000 MCG tablet, Take 100 mcg by mouth once daily.  , Disp: , Rfl:     diazePAM (VALIUM) 2 MG tablet, TAKE 1 TABLET BY MOUTH 3 TIMES A DAY AS NEEDED FOR ANXIETY, Disp: 90 tablet, Rfl: 2    fluticasone (FLONASE) 50 mcg/actuation nasal spray, 2 sprays (100 mcg total) by Each Nare route once daily., Disp: 1 Bottle, Rfl: 11    folic acid (FOLVITE) 800 MCG Tab, Take 800 mcg by mouth once daily.  , Disp: , Rfl:     furosemide (LASIX) 20 MG tablet, Take 1 tablet (20 mg total) by mouth daily as needed (swelling)., Disp: 30 tablet, Rfl: " 1    gabapentin (NEURONTIN) 300 MG capsule, TAKE 1 CAPSULE (300 MG TOTAL) BY MOUTH EVERY EVENING., Disp: 30 capsule, Rfl: 10    ketorolac (TORADOL) 10 mg tablet, Take 1 tablet (10 mg total) by mouth every 6 (six) hours. As needed to control pain.  Best if taken with food., Disp: 20 tablet, Rfl: 0    levETIRAcetam (KEPPRA) 1000 MG tablet, TAKE 1 TABLET BY MOUTH TWICE A DAY, Disp: 60 tablet, Rfl: 6    lisinopril (PRINIVIL,ZESTRIL) 20 MG tablet, TAKE 1 TABLET (20 MG TOTAL) BY MOUTH ONCE DAILY., Disp: 90 tablet, Rfl: 1    meclizine (ANTIVERT) 25 mg tablet, TAKE 1 TABLET (25 MG TOTAL) BY MOUTH 3 (THREE) TIMES DAILY AS NEEDED FOR DIZZINESS., Disp: 30 tablet, Rfl: 0    neomycin-polymyxin-hydrocortisone (CORTISPORIN) otic solution, Three drops to affected ear 4 times daily, Disp: 1 Bottle, Rfl: 3    potassium chloride SA (K-DUR,KLOR-CON) 20 MEQ tablet, Take 1 tablet (20 mEq total) by mouth daily as needed (when taking furosemide for swelling)., Disp: 30 tablet, Rfl: 1    melatonin 5 mg Tab, Take 1 tablet (5 mg total) by mouth nightly as needed., Disp: , Rfl:   Any other notable medications as documented in HPI    Allergies  Review of patient's allergies indicates:  No Known Allergies    Social History  Social History     Socioeconomic History    Marital status:      Spouse name: Not on file    Number of children: Not on file    Years of education: Not on file    Highest education level: Not on file   Social Needs    Financial resource strain: Not on file    Food insecurity - worry: Not on file    Food insecurity - inability: Not on file    Transportation needs - medical: Not on file    Transportation needs - non-medical: Not on file   Occupational History    Not on file   Tobacco Use    Smoking status: Never Smoker    Smokeless tobacco: Never Used   Substance and Sexual Activity    Alcohol use: Yes     Alcohol/week: 1.0 oz     Types: 2 Standard drinks or equivalent per week    Drug use: No     "Sexual activity: Not Currently   Other Topics Concern    Patient feels they ought to cut down on drinking/drug use Not Asked    Patient annoyed by others criticizing their drinking/drug use Not Asked    Patient has felt bad or guilty about drinking/drug use Not Asked    Patient has had a drink/used drugs as an eye opener in the AM Not Asked   Social History Narrative    [facility] lives at [facility], an assisted living.     Any other notable Social History as documented in HPI.    Family History  No family history on file.  Any other notable FMH as documented in HPI.    Physical Exam  BP (!) 169/101   Pulse 83   Ht 5' 5" (1.651 m)   Wt 54.4 kg (120 lb)   BMI 19.97 kg/m²     General: Well-developed, well-groomed. No apparent distress  HENT: Normocephalic, atraumatic.    Cardiovascular: Regular rate and rhythm with no murmurs, rubs or gallops.    Chest: Lungs clear to auscultation bilaterally.  No wheezes, stridor, ronchi appreciated.  Abdomen: Normoactive bowel sounds present.  Soft, nontender to palpation.    Neurologic Exam: The patient is awake, alert and oriented to person, place. Language is fluent.  Tangential thought processes, difficult to redirect frequently.    Cranial nerves:   Pupils are round and reactive to light and accommodation.   Visual fields are full to confrontation.    Ocular motility is full in all cardinal positions of gaze.   Facial sensation is normal to light touch.   L lower facial droop.   Hearing is symmetric bilaterally.   Palate elevates symmetrically.   Shoulder elevation is symmetric and full strength bilaterally.   Tongue is midline and neck rotation strength is normal bilaterally.      Motor examination of all extremities demonstrates normal bulk and tone in all four limbs. There are no atrophy or fasciculations. Strength is 5/5 in the upper and 4/5 lower extremities bilaterally.    Sensory examination is normal light touch in BUE and BLE.    Deep tendon reflexes are 2+ " and symmetric in the upper and lower extremities bilaterally.      Gait: Stable casual gait. Unable to complete tandem gait.    Coordination: Finger to nose w/ mild dysmetria b/l.    Miscellaneous: Vocal ataxia. Increased tone on R.  Tenderness on ear canal when examing with otoscope, small amount of erythema on tympanic membrane at 7-9 o'clock position.    Rinne: Air>bone conduction b/l  Rodriguez: No lateralization  Mild tenderness to palpation of L SCM body, increasing to exquisite tenderness at mastoid process.      Lab and Test Results     Ref. Range 5/9/2018 11:01   Thiamine Latest Ref Range: 38 - 122 ug/L 75   Vitamin E Latest Ref Range: 500 - 1800 ug/dL 909   Glutamic Acid Decarb Ab Latest Ref Range: <=0.02 nmol/L 0.00   TSH Latest Ref Range: 0.400 - 4.000 uIU/mL 1.762   T4 Total Latest Ref Range: 4.5 - 11.5 ug/dL 5.4   Thyroperoxidase Antibodies Latest Ref Range: <6.0 IU/mL <6.0   Antigliadin Ab IgG Latest Ref Range: <20 UNITS 4   Antigliadin Abs, IgA Latest Ref Range: <20 UNITS 21 (H)   AChR Binding Ab, Serum Latest Ref Range: <=0.02 nmol/L 0.00   AChR Ganglionic Neuronal Ab Latest Ref Range: <=0.02 nmol/L 0.00   NMO Interpretive Comments Unknown SEE BELOW   PAVAL reflex test added Unknown None.   Striated Muscle Ab Latest Ref Range: <1:120 titer Negative       Images:  7/10/18 MRI Brain w/ and w/o: Per independent resident review and interpretation,  B/l hygromas.  Possible trace subacute-remote SDH overlying L frontal lobe.      Assessment and Plan    Problem List Items Addressed This Visit        Neuro    Secondary parkinsonism    Overview     Failed multiple meds         Current Assessment & Plan       -Failed multiple medications, maintain current regimen for now         Dementia    Overview     2011 Neuropsychological testing is consistent with moderate dementia         Current Assessment & Plan     Assessment  Breonna Silva is a 63 y.o. female  w/ PMH significant for depression/anxiety, dementia,  seizures, parkinson's disease.    Her evaluation and management of dementia symptoms has been difficult.  Focus on ear pain/facial droop at this visit.    .  Recommendations & Plan:  -Check folate, B12, homocysteine, MMA at next visit  -RTC tentatively on 5/28 when Dr. Varghese is staffing, may consider sooner visit pending MRI results             Facial droop    Current Assessment & Plan     See L ear pain for discussion         Relevant Orders    MRI Brain W WO Contrast    MRI Soft Tissue Neck W W/O Contrast    Basic metabolic panel (Completed)    Seizure    Overview     Maintained on keppra         Current Assessment & Plan     No recent seizures  Continue keppra 1 g BID            Psychiatric    Alcohol dependence with alcohol-induced persisting dementia    Current Assessment & Plan     Continues to drink, suspect frequently, but unable to specify frequency.  Also takes valium on a regular basis.    -B12, folate, MMA, homocysteine previously ordered, but not collected at lab visit.  Will plan to reorder at next visit.            ENT    Ear pain, left - Primary    Current Assessment & Plan     New problem, recent onset.  Unclear etiology.  Consider musculoskeletal (e.g. SCM spasm) or infectious process near mastoid process.  Could also consider a primary ear process given recent deimpaction.  Question whether or not development of L facial droop is temporally related to these symptoms, but the lateralization of these two symptoms is certainly concerning.    Had improvement with recent toradol.    Plan  -MRI Brain and neck (soft tissue) w/ and w/o contrast  -F/u with ENT  -Tylenol/ibuprofen for pain relief         Relevant Orders    MRI Brain W WO Contrast    MRI Soft Tissue Neck W W/O Contrast    Basic metabolic panel (Completed)       Other    Chronic prescription benzodiazepine use    Overview     Valium 2 mg BID-TID for anxiety         Current Assessment & Plan     Chronic use of valium 2 mg BID-TID for  anxiety.  Attempts to wean in the past have been unsuccessful despite extended discussions regarding the risks and side-effects of long-term benzodiazepine use.           Insomnia    Current Assessment & Plan     Extended discussion regarding trial of melatonin and side effects.      -Instructed to purchase melatonin 5-10 mg tablets off the shelf and take QHS PRN.  Discussed that this would not be a prescription and she will need to purchase off the shelf.  Written in patient instructions.         Relevant Medications    melatonin 5 mg Tab            Baldo Saul MD  Neurology Resident   [facility] [address] [place] [address]

## 2019-03-20 NOTE — PATIENT INSTRUCTIONS
-MRI of brain and neck   -Try melatonin 5-10 mg nightly (purchase off the shelf)  -Return to clinic in 2 months    Baldo Saul MD  [facility]   Phone Number: [phone]  Fax Number: [phone]

## 2019-03-21 ENCOUNTER — TELEPHONE (OUTPATIENT)
Dept: NEUROLOGY | Facility: CLINIC | Age: 64
End: 2019-03-21

## 2019-03-21 DIAGNOSIS — H92.02 EAR PAIN, LEFT: Primary | ICD-10-CM

## 2019-03-21 DIAGNOSIS — G47.9 DIFFICULTY SLEEPING: ICD-10-CM

## 2019-03-21 PROBLEM — R29.810 FACIAL DROOP: Status: ACTIVE | Noted: 2019-03-21

## 2019-03-21 PROBLEM — G47.00 INSOMNIA: Status: ACTIVE | Noted: 2019-03-21

## 2019-03-21 PROBLEM — Z79.899 CHRONIC PRESCRIPTION BENZODIAZEPINE USE: Status: ACTIVE | Noted: 2019-03-21

## 2019-03-21 RX ORDER — ACETAMINOPHEN 500 MG
5 TABLET ORAL NIGHTLY PRN
Status: CANCELLED | COMMUNITY
Start: 2019-03-21

## 2019-03-21 RX ORDER — ACETAMINOPHEN 500 MG
5 TABLET ORAL NIGHTLY PRN
COMMUNITY
Start: 2019-03-21

## 2019-03-21 NOTE — TELEPHONE ENCOUNTER
----- Message from Edwin Rodriguez sent at 3/20/2019  3:52 PM CDT -----  Contact: Patient @ [phone]  Patient requesting a return call regarding medication, pls call to discuss

## 2019-03-21 NOTE — TELEPHONE ENCOUNTER
03/21/2019  1:21 PM  Attempted to return pt's call, no answer.  Left VM and will try again later.    3:34 PM  No Answer.    3:55 PM  Left message that I would like to discuss the requested refills with her (I was not the original prescriber, would like to discuss the reasons/side effects of Toradol before prescribing).  In the interim, use tylenol/ibuprofen as discussed yesterday.  Will try to return call again tomorrow.    03/22/2019  2:01 PM  No answer, left VM.      03/25/2019  12:40 PM  No answer.  Left VM with clinic number and instructions to call clinic with when she is available to talk on the phone.    [facility]   Phone Number: [phone]  Fax Number: [phone]              Baldo Saul MD

## 2019-03-21 NOTE — TELEPHONE ENCOUNTER
----- Message from Ekaterina Mayers sent at 3/20/2019  3:04 PM CDT -----  Rx Refill/Request     Is this a Refill or New Rx:  Refill    Rx Name and Strength:  ketorolac (TORADOL) 10 mg tablet    Preferred Pharmacy with phone number:     [facility]/pharmacy #[id number] - [place] [address] [address]  Phone: [phone] Fax: [phone]      Communication Preference:pt@ [phone]  Additional Information:says she forgot to get a new prescription today when she was in the office today for her visit. Please call.

## 2019-03-21 NOTE — TELEPHONE ENCOUNTER
----- Message from Ekaterina Mayers sent at 3/21/2019  8:07 AM CDT -----  Contact: pt@ [phone]  Asking to have Dr. Saul call in a refill on medication: fluticasone (FLONASE) 50 mcg/actuation nasal spray, says the doctor that originally gave her the prescription is out of town. Says she is having ear problems and needs the medication. Please call.    [facility]/pharmacy #[id number] - [place], [place]  [facility] [address]  Phone: [phone] Fax: [phone]

## 2019-03-22 RX ORDER — KETOROLAC TROMETHAMINE 10 MG/1
10 TABLET, FILM COATED ORAL EVERY 6 HOURS
Qty: 20 TABLET | Refills: 0 | Status: CANCELLED | OUTPATIENT
Start: 2019-03-22

## 2019-03-22 NOTE — ASSESSMENT & PLAN NOTE
Chronic use of valium 2 mg BID-TID for anxiety.  Attempts to wean in the past have been unsuccessful despite extended discussions regarding the risks and side-effects of long-term benzodiazepine use.

## 2019-03-22 NOTE — ASSESSMENT & PLAN NOTE
Assessment  Breonna Silva is a 63 y.o. female  w/ PMH significant for depression/anxiety, dementia, seizures, parkinson's disease.    Her evaluation and management of dementia symptoms has been difficult.  Focus on ear pain/facial droop at this visit.    .  Recommendations & Plan:  -Check folate, B12, homocysteine, MMA at next visit  -RTC tentatively on 5/28 when Dr. Varghese is staffing, may consider sooner visit pending MRI results

## 2019-03-22 NOTE — ASSESSMENT & PLAN NOTE
New problem, recent onset.  Unclear etiology.  Consider musculoskeletal (e.g. SCM spasm) or infectious process near mastoid process.  Could also consider a primary ear process given recent deimpaction.  Question whether or not development of L facial droop is temporally related to these symptoms, but the lateralization of these two symptoms is certainly concerning.    Had improvement with recent toradol.    Plan  -MRI Brain and neck (soft tissue) w/ and w/o contrast  -F/u with ENT  -Tylenol/ibuprofen for pain relief

## 2019-03-22 NOTE — ASSESSMENT & PLAN NOTE
Continues to drink, suspect frequently, but unable to specify frequency.  Also takes valium on a regular basis.    -B12, folate, MMA, homocysteine previously ordered, but not collected at lab visit.  Will plan to reorder at next visit.

## 2019-03-22 NOTE — ASSESSMENT & PLAN NOTE
Extended discussion regarding trial of melatonin and side effects.      -Instructed to purchase melatonin 5-10 mg tablets off the shelf and take QHS PRN.  Discussed that this would not be a prescription and she will need to purchase off the shelf.  Written in patient instructions.

## 2019-03-25 ENCOUNTER — HOSPITAL ENCOUNTER (OUTPATIENT)
Dept: RADIOLOGY | Facility: HOSPITAL | Age: 64
Discharge: HOME | End: 2019-03-25
Attending: STUDENT IN AN ORGANIZED HEALTH CARE EDUCATION/TRAINING PROGRAM
Payer: MEDICARE

## 2019-03-25 ENCOUNTER — TELEPHONE (OUTPATIENT)
Dept: NEUROLOGY | Facility: CLINIC | Age: 64
End: 2019-03-25

## 2019-03-25 DIAGNOSIS — F41.9 ANXIETY: ICD-10-CM

## 2019-03-25 DIAGNOSIS — H92.02 EAR PAIN, LEFT: ICD-10-CM

## 2019-03-25 DIAGNOSIS — R29.810 FACIAL DROOP: ICD-10-CM

## 2019-03-25 PROCEDURE — 70553 MRI BRAIN STEM W/O & W/DYE: CPT | Mod: TC,HCNC

## 2019-03-25 PROCEDURE — 25500020 PHARM REV CODE 255: Mod: HCNC | Performed by: STUDENT IN AN ORGANIZED HEALTH CARE EDUCATION/TRAINING PROGRAM

## 2019-03-25 PROCEDURE — 70553 MRI BRAIN W WO CONTRAST: ICD-10-PCS | Mod: 26,HCNC,, | Performed by: RADIOLOGY

## 2019-03-25 PROCEDURE — A9585 GADOBUTROL INJECTION: HCPCS | Mod: HCNC | Performed by: STUDENT IN AN ORGANIZED HEALTH CARE EDUCATION/TRAINING PROGRAM

## 2019-03-25 PROCEDURE — 70553 MRI BRAIN STEM W/O & W/DYE: CPT | Mod: 26,HCNC,, | Performed by: RADIOLOGY

## 2019-03-25 RX ORDER — DIAZEPAM 2 MG/1
TABLET ORAL
Qty: 90 TABLET | Refills: 2 | Status: ON HOLD | OUTPATIENT
Start: 2019-03-25 | End: 2019-05-29 | Stop reason: HOSPADM

## 2019-03-25 RX ORDER — GADOBUTROL 604.72 MG/ML
10 INJECTION INTRAVENOUS
Status: DISCONTINUED | OUTPATIENT
Start: 2019-03-25 | End: 2019-03-25

## 2019-03-25 RX ORDER — DIAZEPAM 2 MG/1
TABLET ORAL
Qty: 90 TABLET | Refills: 2 | Status: SHIPPED | OUTPATIENT
Start: 2019-03-25 | End: 2019-03-25 | Stop reason: SDUPTHER

## 2019-03-25 RX ORDER — GADOBUTROL 604.72 MG/ML
6 INJECTION INTRAVENOUS
Status: COMPLETED | OUTPATIENT
Start: 2019-03-25 | End: 2019-03-25

## 2019-03-25 RX ADMIN — GADOBUTROL 6 ML: 604.72 INJECTION INTRAVENOUS at 02:03

## 2019-03-25 NOTE — TELEPHONE ENCOUNTER
----- Message from Ekaterina Mayers sent at 3/25/2019 10:44 AM CDT -----  Rx Refill/Request     Is this a Refill or New Rx:  Refill    Rx Name and Strength:  diazePAM (VALIUM) 2 MG tablet    Preferred Pharmacy with phone number:     [facility]/pharmacy #[id number] - [address] [address]  Phone: [phone] Fax: [phone]      Communication Preference:  Additional Information:

## 2019-03-25 NOTE — TELEPHONE ENCOUNTER
Previous valium refill did not print correctly, reordered and printed successfully.  Will have MA fax to pharmacy.    Baldo Saul MD

## 2019-03-25 NOTE — TELEPHONE ENCOUNTER
----- Message from Baldo Saul MD sent at 3/21/2019  9:52 PM CDT -----  Sky Pablo,    Please call/schedule Ms. Cousins for f/u on 5/28 in my clinic.    Thanks,  Baldo

## 2019-03-26 ENCOUNTER — PATIENT OUTREACH (OUTPATIENT)
Dept: ADMINISTRATIVE | Facility: HOSPITAL | Age: 64
End: 2019-03-26

## 2019-03-26 RX ORDER — MECLIZINE HYDROCHLORIDE 25 MG/1
25 TABLET ORAL 3 TIMES DAILY PRN
Qty: 30 TABLET | Refills: 0 | Status: SHIPPED | OUTPATIENT
Start: 2019-03-26 | End: 2019-04-26 | Stop reason: SDUPTHER

## 2019-03-26 NOTE — PROGRESS NOTES
[facility] is committed to your overall health.  To help you get the most out of each of your visits, we will review your information to make sure you are up to date on all of your recommended tests and/or procedures.       Your PCP  Jarrell Del Valle MD   found that you may be due for:       Health Maintenance Due   Topic Date Due    Pneumococcal Vaccine (Medium Risk) (1 of 1 - PPSV23) 04/19/1974    Zoster Vaccine  04/19/2015    Mammogram  03/02/2017    Pap Smear with HPV Cotest  03/02/2018             If you have had any of the above done at another facility, please bring the records or information with you so that your record at [facility] will be complete.  If you would like to schedule any of these, please contact me.     If you are currently taking medication, please bring it with you to your appointment for review.     Also, if you have any type of Advanced Directives, please bring them with you to your office visit so we may scan them into your chart.       Thank you for Choosing [facility] for your healthcare needs.        Additional Information  If you have questions, you can email [email or web address] or call [phone]  to talk to our [facility] staff. Remember, [facility] is NOT to be used for urgent needs. For medical emergencies, dial 911.

## 2019-03-27 ENCOUNTER — TELEPHONE (OUTPATIENT)
Dept: OTOLARYNGOLOGY | Facility: CLINIC | Age: 64
End: 2019-03-27

## 2019-03-27 NOTE — TELEPHONE ENCOUNTER
----- Message from Mary Ellen Benton sent at 3/27/2019  1:47 PM CDT -----  Contact: [facility]/pharmacy #[id number]                                                    PHARMACY  REFILL  RQUEST      Please refill the medication(s) listed below. Please call the patient when the prescription(s) is ready for  at this phone number   CHANTEL GOMEZ / # [phone] (M)       Medication #1  ketorolac (TORADOL) 10 mg tablet          Preferred Pharmacy: [facility]/pharmacy #[id number] - [address]     [phone] (Phone)  [phone] (Fax)

## 2019-03-28 RX ORDER — KETOROLAC TROMETHAMINE 10 MG/1
TABLET, FILM COATED ORAL
Qty: 20 TABLET | Refills: 0 | OUTPATIENT
Start: 2019-03-28

## 2019-03-28 NOTE — TELEPHONE ENCOUNTER
"Returned pt call. LM on       ----- Message from Mary Ellen Benton sent at 3/28/2019  2:26 PM CDT -----  Contact: CHANTEL GOMEZ [[id number]]  Name of Who is Calling: HCANTEL GOMEZ [[id number]]      What is the request in detail:   Patient called requesting a call as this pertains to her medication ketorolac (TORADOL) 10 mg tablet. Patient says, "the medication isn't working."   Please give a call back at your earliest convenience.     THANKS!      Can the clinic reply by [facility]: No      What Number to Call Back: CHANTEL GOMEZ / # [phone]                                      "

## 2019-03-29 ENCOUNTER — TELEPHONE (OUTPATIENT)
Dept: OTOLARYNGOLOGY | Facility: CLINIC | Age: 64
End: 2019-03-29

## 2019-03-29 ENCOUNTER — TELEPHONE (OUTPATIENT)
Dept: NEUROLOGY | Facility: CLINIC | Age: 64
End: 2019-03-29

## 2019-03-29 DIAGNOSIS — R22.1 NECK MASS: Primary | ICD-10-CM

## 2019-03-29 NOTE — TELEPHONE ENCOUNTER
03/29/2019  4:44 PM  Called to discuss MRI results and plan to order CT scan.  No answer, left voicemail along with clinic number and instructions to call clinic with times she is available to speak regarding results and next steps.    Baldo Saul MD

## 2019-03-29 NOTE — PROGRESS NOTES
Reviewed and discussed MRI with Dr. Varghese.  Ordered CT scan, called pt (no answer) and sent message to ENT to discuss MRI findings.      Baldo Saul MD

## 2019-04-01 ENCOUNTER — TELEPHONE (OUTPATIENT)
Dept: INTERNAL MEDICINE | Facility: CLINIC | Age: 64
End: 2019-04-01

## 2019-04-01 NOTE — TELEPHONE ENCOUNTER
----- Message from Angelica Pardo sent at 4/1/2019  8:14 AM CDT -----  Contact: Pt   Name of Who is Calling: CHANTEL GOMEZ [[id number]]    What is the request in detail: Pt called to check the status of her MRI results. Pt also states she need medication for her ear pain due to her Vertigo. Please call to further discuss and advise.     Can the clinic reply by [facility]: No     What Number to Call Back if not in [facility]: [phone]

## 2019-04-02 NOTE — TELEPHONE ENCOUNTER
04/02/2019  8:44 AM, 3:12 PM    No answer.  Left VM with instructions to call clinic number with times she's available to talk about her results and I will try to call her back during those times.    [facility]   Phone Number: [phone]  Fax Number: [phone]    Baldo Saul MD

## 2019-04-03 ENCOUNTER — TELEPHONE (OUTPATIENT)
Dept: NEUROLOGY | Facility: CLINIC | Age: 64
End: 2019-04-03

## 2019-04-03 NOTE — TELEPHONE ENCOUNTER
04/03/2019  1:24 PM    No answer.  Left VM with instructions to call clinic number with times the pt is available to talk and I will try to call her back during those times.    [facility]   Phone Number: [phone]  Fax Number: [phone]    Baldo Saul MD

## 2019-04-05 ENCOUNTER — TELEPHONE (OUTPATIENT)
Dept: NEUROLOGY | Facility: CLINIC | Age: 64
End: 2019-04-05

## 2019-04-05 NOTE — TELEPHONE ENCOUNTER
04/05/2019  12:35 PM    No answer.  Left VM with instructions to call clinic number with times the pt is available to talk and I will try to call her back during those times.  Alternatively, offered to have her scheduled for a sooner appointment to discuss her imaging findings and next steps.    [facility]   Phone Number: [phone]  Fax Number: [phone]    Baldo Saul MD

## 2019-04-09 ENCOUNTER — TELEPHONE (OUTPATIENT)
Dept: NEUROLOGY | Facility: CLINIC | Age: 64
End: 2019-04-09

## 2019-04-09 ENCOUNTER — TELEPHONE (OUTPATIENT)
Dept: INTERNAL MEDICINE | Facility: CLINIC | Age: 64
End: 2019-04-09

## 2019-04-09 NOTE — TELEPHONE ENCOUNTER
MA called pt's number with no answer, left  to schedule sooner clinic appointment.  I called the emergency contact number for Geetha Garcia.  The number went to Ms. Garcia's  who agreed to relay a message that I had called and gave callback number.  Called cell phone, but no answer.    Baldo Saul MD  [facility]   Phone Number: [phone]  Fax Number: [phone]

## 2019-04-09 NOTE — TELEPHONE ENCOUNTER
----- Message from Mason Keys sent at 4/9/2019  9:12 AM CDT -----  Contact: CHANTEL GOMEZ [[id number]]  Name of Who is Calling: CHANTEL GOMEZ [[id number]]      What is the request in detail: Patient would like to speak with staff in regards to pain on the left side of her face. Patient canceled her appointment today due to not being able physical able to get here. Please advise, requesting to speak to someone      Can the clinic reply by [facility]: no      What Number to Call Back if not in [facility]: [phone]

## 2019-04-09 NOTE — TELEPHONE ENCOUNTER
Pt expressed verbal understanding concerning advice from Dr. Del Valle regarding ear pain. Confirmed appointment with pt on 04/10/2019. CF

## 2019-04-09 NOTE — TELEPHONE ENCOUNTER
Left a message for the patient to let her know an appointment is scheduled for her on 5/8 at 11:00. She was asked to call back to reschedule if this is not a good day and time for her.

## 2019-04-10 ENCOUNTER — OFFICE VISIT (OUTPATIENT)
Dept: INTERNAL MEDICINE | Facility: CLINIC | Age: 64
End: 2019-04-10
Payer: MEDICARE

## 2019-04-10 VITALS
HEIGHT: 65 IN | DIASTOLIC BLOOD PRESSURE: 66 MMHG | BODY MASS INDEX: 19.97 KG/M2 | HEART RATE: 94 BPM | SYSTOLIC BLOOD PRESSURE: 94 MMHG

## 2019-04-10 DIAGNOSIS — I10 ESSENTIAL HYPERTENSION, BENIGN: Chronic | ICD-10-CM

## 2019-04-10 DIAGNOSIS — G21.9 SECONDARY PARKINSONISM, UNSPECIFIED SECONDARY PARKINSONISM TYPE: Primary | ICD-10-CM

## 2019-04-10 DIAGNOSIS — R42 VERTIGO, CONSTANT: ICD-10-CM

## 2019-04-10 DIAGNOSIS — F10.27 ALCOHOL DEPENDENCE WITH ALCOHOL-INDUCED PERSISTING DEMENTIA: ICD-10-CM

## 2019-04-10 DIAGNOSIS — H92.02 EAR PAIN, LEFT: ICD-10-CM

## 2019-04-10 DIAGNOSIS — F03.90 DEMENTIA WITHOUT BEHAVIORAL DISTURBANCE, UNSPECIFIED DEMENTIA TYPE: ICD-10-CM

## 2019-04-10 DIAGNOSIS — G56.41 COMPLEX REGIONAL PAIN SYNDROME TYPE 2 OF RIGHT UPPER EXTREMITY: ICD-10-CM

## 2019-04-10 PROCEDURE — 3008F BODY MASS INDEX DOCD: CPT | Mod: HCNC,CPTII,S$GLB, | Performed by: INTERNAL MEDICINE

## 2019-04-10 PROCEDURE — 3078F PR MOST RECENT DIASTOLIC BLOOD PRESSURE < 80 MM HG: ICD-10-PCS | Mod: HCNC,CPTII,S$GLB, | Performed by: INTERNAL MEDICINE

## 2019-04-10 PROCEDURE — 99214 OFFICE O/P EST MOD 30 MIN: CPT | Mod: HCNC,S$GLB,, | Performed by: INTERNAL MEDICINE

## 2019-04-10 PROCEDURE — 3074F SYST BP LT 130 MM HG: CPT | Mod: HCNC,CPTII,S$GLB, | Performed by: INTERNAL MEDICINE

## 2019-04-10 PROCEDURE — 3008F PR BODY MASS INDEX (BMI) DOCUMENTED: ICD-10-PCS | Mod: HCNC,CPTII,S$GLB, | Performed by: INTERNAL MEDICINE

## 2019-04-10 PROCEDURE — 99214 PR OFFICE/OUTPT VISIT, EST, LEVL IV, 30-39 MIN: ICD-10-PCS | Mod: HCNC,S$GLB,, | Performed by: INTERNAL MEDICINE

## 2019-04-10 PROCEDURE — 99999 PR PBB SHADOW E&M-EST. PATIENT-LVL III: ICD-10-PCS | Mod: PBBFAC,HCNC,, | Performed by: INTERNAL MEDICINE

## 2019-04-10 PROCEDURE — 3074F PR MOST RECENT SYSTOLIC BLOOD PRESSURE < 130 MM HG: ICD-10-PCS | Mod: HCNC,CPTII,S$GLB, | Performed by: INTERNAL MEDICINE

## 2019-04-10 PROCEDURE — 3078F DIAST BP <80 MM HG: CPT | Mod: HCNC,CPTII,S$GLB, | Performed by: INTERNAL MEDICINE

## 2019-04-10 PROCEDURE — 99999 PR PBB SHADOW E&M-EST. PATIENT-LVL III: CPT | Mod: PBBFAC,HCNC,, | Performed by: INTERNAL MEDICINE

## 2019-04-10 RX ORDER — LOSARTAN POTASSIUM 25 MG/1
25 TABLET ORAL DAILY
Qty: 90 TABLET | Refills: 3 | Status: SHIPPED | OUTPATIENT
Start: 2019-04-10 | End: 2020-04-09

## 2019-04-10 NOTE — PROGRESS NOTES
Subjective:       Patient ID: Breonna Silva is a 63 y.o. female.    Chief Complaint: Otalgia (left ear )    Pt here for f/u. She has chronic dizziness/vertigo that has previously been evaluated. Meclizine is Rx but she has previously said she doesn't like to take it. However, she is now saying that it works well for her. MRI brain previously has been unrevealing for cause. However, neuro recently performed another MRI which is now showing questionable L infratemporal fossa edema/enhancement. Her neuro Dr. Saul contacted me as they have been unable to get in touch with her about f/u with a CT and then ENT once this is complete. We scheduled both of these today. She is having associated L ear pain for which tylenol has not worked. However, she recently started taking naproxen which she says has helped.     She has parkinson's and is followed by neuro regularly. She has trouble remembering things and has a known dementia syndrome. She is living at assisted living facility where she says she administers her own medication. She is taking diazepam which says is for her tremor. She has not tolerated several meds in the past.      She has been dx with Sz disorder. However, sz history is questionable and its relation to etoh intake remains in question. She takes keppra. No sz recently.        She reports depression. She has been on cymbalta but didn't find effective so stopped. She also stopped lexapro b/c she felt it made her sleep walk. She saw psychiatry was on remeron but was stopped by neuro. No SI/HI.     She has chronic pain in R arm related to cervical radiculopathy. She has seen pain mgmt. She had injection which did help. She is on gabapentin but not sure this helps her.     Pt's BP is well controlled and actually low today. Tolerating meds well. Pt denies cp/sob/ha/vision or neuro changes. Not checking at home.     She was in ED 6 mos ago for swelling in bilat feet. W/u revealed a mildly elevated BNP but pt  declined admission so was discharged with a small supply of lasix which she said helped. Her swelling now comes and goes. Denies any sob/orthopnea/pnd. She never completed ECHO as requested.     Hypertension   Pertinent negatives include no chest pain, headaches or shortness of breath.     Review of Systems   Constitutional: Negative for fatigue and unexpected weight change.   Eyes: Negative for visual disturbance.   Respiratory: Negative for shortness of breath.    Cardiovascular: Negative for chest pain.   Gastrointestinal: Negative for abdominal pain and blood in stool.   Neurological: Positive for dizziness. Negative for weakness and headaches.   Psychiatric/Behavioral: Positive for dysphoric mood.       Objective:      Physical Exam   Constitutional: She is oriented to person, place, and time. She appears well-developed and well-nourished.   Eyes: Pupils are equal, round, and reactive to light. EOM are normal.   Neck: Neck supple. No thyromegaly present.   Cardiovascular: Normal rate, regular rhythm and normal heart sounds.   Pulmonary/Chest: Effort normal and breath sounds normal.   Musculoskeletal: She exhibits no edema.   Lymphadenopathy:     She has no cervical adenopathy.   Neurological: She is alert and oriented to person, place, and time. She has normal strength. No cranial nerve deficit. Gait abnormal.   Wide unsteady gait   Psychiatric: Her affect is blunt. Her speech is delayed. She is slowed. Thought content is not paranoid. She expresses no suicidal ideation. She exhibits abnormal recent memory.       Assessment:       1. Secondary parkinsonism, unspecified secondary Parkinsonism type    2. Dementia without behavioral disturbance, unspecified dementia type    3. Complex regional pain syndrome type 2 of right upper extremity    4. Alcohol dependence with alcohol-induced persisting dementia    5. Ear pain, left    6. Vertigo, constant    7. Essential hypertension, benign        Plan:       1. Keep f/u  with specialists   2. Continue current meds  3. ECHO as previously ordered  4. Pt assisted in scheduling CT neck as ordered by neuro and for f/u with ENT  5. Change lisinopril to losartan but decrease to 25mg daily  6. Home BP monitoring  7. Ok to take naproxen but limit to 2 tabs bid for no more than 10-14 days  8. Written instructions and appointments given to pt

## 2019-04-12 ENCOUNTER — HOSPITAL ENCOUNTER (INPATIENT)
Facility: HOSPITAL | Age: 64
LOS: 3 days | Discharge: HOME | DRG: 683 | End: 2019-04-15
Attending: EMERGENCY MEDICINE | Admitting: INTERNAL MEDICINE
Payer: MEDICARE

## 2019-04-12 ENCOUNTER — HOSPITAL ENCOUNTER (OUTPATIENT)
Dept: RADIOLOGY | Facility: OTHER | Age: 64
Discharge: HOME | DRG: 683 | End: 2019-04-12
Attending: STUDENT IN AN ORGANIZED HEALTH CARE EDUCATION/TRAINING PROGRAM
Payer: MEDICARE

## 2019-04-12 ENCOUNTER — TELEPHONE (OUTPATIENT)
Dept: INTERNAL MEDICINE | Facility: CLINIC | Age: 64
End: 2019-04-12

## 2019-04-12 DIAGNOSIS — N17.9 AKI (ACUTE KIDNEY INJURY): ICD-10-CM

## 2019-04-12 DIAGNOSIS — R56.9 SEIZURE: ICD-10-CM

## 2019-04-12 DIAGNOSIS — I95.9 HYPOTENSION, UNSPECIFIED HYPOTENSION TYPE: ICD-10-CM

## 2019-04-12 DIAGNOSIS — R22.1 NECK MASS: ICD-10-CM

## 2019-04-12 DIAGNOSIS — F03.90 DEMENTIA WITHOUT BEHAVIORAL DISTURBANCE, UNSPECIFIED DEMENTIA TYPE: ICD-10-CM

## 2019-04-12 DIAGNOSIS — N17.9 ACUTE RENAL FAILURE, UNSPECIFIED ACUTE RENAL FAILURE TYPE: ICD-10-CM

## 2019-04-12 DIAGNOSIS — R22.1 NECK MASS: Primary | ICD-10-CM

## 2019-04-12 LAB
ALBUMIN SERPL BCP-MCNC: 3.7 G/DL (ref 3.5–5.2)
ALP SERPL-CCNC: 67 U/L (ref 55–135)
ALT SERPL W/O P-5'-P-CCNC: 7 U/L (ref 10–44)
ANION GAP SERPL CALC-SCNC: 23 MMOL/L (ref 8–16)
AST SERPL-CCNC: 13 U/L (ref 10–40)
BACTERIA #/AREA URNS AUTO: ABNORMAL /HPF
BASOPHILS # BLD AUTO: 0.02 K/UL (ref 0–0.2)
BASOPHILS NFR BLD: 0.3 % (ref 0–1.9)
BILIRUB SERPL-MCNC: 0.3 MG/DL (ref 0.1–1)
BILIRUB UR QL STRIP: NEGATIVE
BUN SERPL-MCNC: 49 MG/DL (ref 8–23)
CALCIUM SERPL-MCNC: 8.4 MG/DL (ref 8.7–10.5)
CHLORIDE SERPL-SCNC: 95 MMOL/L (ref 95–110)
CLARITY UR REFRACT.AUTO: ABNORMAL
CO2 SERPL-SCNC: 20 MMOL/L (ref 23–29)
COLOR UR AUTO: YELLOW
CREAT SERPL-MCNC: 6.1 MG/DL (ref 0.5–1.4)
DIFFERENTIAL METHOD: ABNORMAL
EOSINOPHIL # BLD AUTO: 0.1 K/UL (ref 0–0.5)
EOSINOPHIL NFR BLD: 1.8 % (ref 0–8)
ERYTHROCYTE [DISTWIDTH] IN BLOOD BY AUTOMATED COUNT: 13 % (ref 11.5–14.5)
EST. GFR  (AFRICAN AMERICAN): 7.8 ML/MIN/1.73 M^2
EST. GFR  (NON AFRICAN AMERICAN): 6.8 ML/MIN/1.73 M^2
GLUCOSE SERPL-MCNC: 85 MG/DL (ref 70–110)
GLUCOSE UR QL STRIP: NEGATIVE
HCT VFR BLD AUTO: 34.2 % (ref 37–48.5)
HGB BLD-MCNC: 11 G/DL (ref 12–16)
HGB UR QL STRIP: ABNORMAL
HYALINE CASTS UR QL AUTO: 107 /LPF
IMM GRANULOCYTES # BLD AUTO: 0.02 K/UL (ref 0–0.04)
IMM GRANULOCYTES NFR BLD AUTO: 0.3 % (ref 0–0.5)
KETONES UR QL STRIP: ABNORMAL
LACTATE SERPL-SCNC: 2.3 MMOL/L (ref 0.5–2.2)
LEUKOCYTE ESTERASE UR QL STRIP: ABNORMAL
LYMPHOCYTES # BLD AUTO: 1 K/UL (ref 1–4.8)
LYMPHOCYTES NFR BLD: 15.8 % (ref 18–48)
MCH RBC QN AUTO: 33.6 PG (ref 27–31)
MCHC RBC AUTO-ENTMCNC: 32.2 G/DL (ref 32–36)
MCV RBC AUTO: 105 FL (ref 82–98)
MICROSCOPIC COMMENT: ABNORMAL
MONOCYTES # BLD AUTO: 0.5 K/UL (ref 0.3–1)
MONOCYTES NFR BLD: 7.3 % (ref 4–15)
NEUTROPHILS # BLD AUTO: 4.9 K/UL (ref 1.8–7.7)
NEUTROPHILS NFR BLD: 74.5 % (ref 38–73)
NITRITE UR QL STRIP: NEGATIVE
NRBC BLD-RTO: 0 /100 WBC
PH UR STRIP: 5 [PH] (ref 5–8)
PLATELET # BLD AUTO: 263 K/UL (ref 150–350)
PMV BLD AUTO: 10.5 FL (ref 9.2–12.9)
POTASSIUM SERPL-SCNC: 3.8 MMOL/L (ref 3.5–5.1)
PROT SERPL-MCNC: 7.3 G/DL (ref 6–8.4)
PROT UR QL STRIP: NEGATIVE
RBC # BLD AUTO: 3.27 M/UL (ref 4–5.4)
RBC #/AREA URNS AUTO: 3 /HPF (ref 0–4)
SODIUM SERPL-SCNC: 138 MMOL/L (ref 136–145)
SP GR UR STRIP: >=1.03 (ref 1–1.03)
SQUAMOUS #/AREA URNS AUTO: 4 /HPF
URN SPEC COLLECT METH UR: ABNORMAL
WBC # BLD AUTO: 6.57 K/UL (ref 3.9–12.7)
WBC #/AREA URNS AUTO: 96 /HPF (ref 0–5)

## 2019-04-12 PROCEDURE — 87077 CULTURE AEROBIC IDENTIFY: CPT | Mod: HCNC

## 2019-04-12 PROCEDURE — 81001 URINALYSIS AUTO W/SCOPE: CPT | Mod: HCNC

## 2019-04-12 PROCEDURE — 63600175 PHARM REV CODE 636 W HCPCS: Mod: HCNC | Performed by: EMERGENCY MEDICINE

## 2019-04-12 PROCEDURE — 83605 ASSAY OF LACTIC ACID: CPT | Mod: 91,HCNC

## 2019-04-12 PROCEDURE — 86778 TOXOPLASMA ANTIBODY IGM: CPT | Mod: HCNC

## 2019-04-12 PROCEDURE — 86665 EPSTEIN-BARR CAPSID VCA: CPT | Mod: 59,HCNC

## 2019-04-12 PROCEDURE — 93005 ELECTROCARDIOGRAM TRACING: CPT | Mod: HCNC

## 2019-04-12 PROCEDURE — 85025 COMPLETE CBC W/AUTO DIFF WBC: CPT | Mod: HCNC

## 2019-04-12 PROCEDURE — 25000003 PHARM REV CODE 250: Mod: HCNC | Performed by: EMERGENCY MEDICINE

## 2019-04-12 PROCEDURE — 12000002 HC ACUTE/MED SURGE SEMI-PRIVATE ROOM: Mod: HCNC

## 2019-04-12 PROCEDURE — 87186 SC STD MICRODIL/AGAR DIL: CPT | Mod: HCNC

## 2019-04-12 PROCEDURE — 86645 CMV ANTIBODY IGM: CPT | Mod: HCNC

## 2019-04-12 PROCEDURE — 99499 NO LOS: ICD-10-PCS | Mod: HCNC,,, | Performed by: OTOLARYNGOLOGY

## 2019-04-12 PROCEDURE — 84100 ASSAY OF PHOSPHORUS: CPT | Mod: HCNC

## 2019-04-12 PROCEDURE — 70491 CT SOFT TISSUE NECK WITH CONTRAST: ICD-10-PCS | Mod: 26,HCNC,, | Performed by: INTERNAL MEDICINE

## 2019-04-12 PROCEDURE — 86703 HIV-1/HIV-2 1 RESULT ANTBDY: CPT | Mod: HCNC

## 2019-04-12 PROCEDURE — 86140 C-REACTIVE PROTEIN: CPT | Mod: HCNC

## 2019-04-12 PROCEDURE — 80053 COMPREHEN METABOLIC PANEL: CPT | Mod: HCNC

## 2019-04-12 PROCEDURE — 70491 CT SOFT TISSUE NECK W/DYE: CPT | Mod: 26,HCNC,, | Performed by: INTERNAL MEDICINE

## 2019-04-12 PROCEDURE — 93010 EKG 12-LEAD: ICD-10-PCS | Mod: HCNC,,, | Performed by: INTERNAL MEDICINE

## 2019-04-12 PROCEDURE — 87088 URINE BACTERIA CULTURE: CPT | Mod: HCNC

## 2019-04-12 PROCEDURE — 96365 THER/PROPH/DIAG IV INF INIT: CPT | Mod: HCNC

## 2019-04-12 PROCEDURE — 87086 URINE CULTURE/COLONY COUNT: CPT | Mod: HCNC

## 2019-04-12 PROCEDURE — 83735 ASSAY OF MAGNESIUM: CPT | Mod: HCNC

## 2019-04-12 PROCEDURE — 86592 SYPHILIS TEST NON-TREP QUAL: CPT | Mod: HCNC

## 2019-04-12 PROCEDURE — 93010 ELECTROCARDIOGRAM REPORT: CPT | Mod: HCNC,,, | Performed by: INTERNAL MEDICINE

## 2019-04-12 PROCEDURE — 86611 BARTONELLA ANTIBODY: CPT | Mod: 91,HCNC

## 2019-04-12 PROCEDURE — 99291 CRITICAL CARE FIRST HOUR: CPT | Mod: ,,, | Performed by: EMERGENCY MEDICINE

## 2019-04-12 PROCEDURE — 51798 US URINE CAPACITY MEASURE: CPT | Mod: HCNC

## 2019-04-12 PROCEDURE — 70491 CT SOFT TISSUE NECK W/DYE: CPT | Mod: TC,HCNC

## 2019-04-12 PROCEDURE — 99499 UNLISTED E&M SERVICE: CPT | Mod: HCNC,,, | Performed by: OTOLARYNGOLOGY

## 2019-04-12 PROCEDURE — 87040 BLOOD CULTURE FOR BACTERIA: CPT | Mod: 59,HCNC

## 2019-04-12 PROCEDURE — 83605 ASSAY OF LACTIC ACID: CPT | Mod: HCNC

## 2019-04-12 PROCEDURE — 25500020 PHARM REV CODE 255: Mod: HCNC | Performed by: STUDENT IN AN ORGANIZED HEALTH CARE EDUCATION/TRAINING PROGRAM

## 2019-04-12 PROCEDURE — 85652 RBC SED RATE AUTOMATED: CPT | Mod: HCNC

## 2019-04-12 PROCEDURE — 86480 TB TEST CELL IMMUN MEASURE: CPT | Mod: HCNC

## 2019-04-12 PROCEDURE — 99291 PR CRITICAL CARE, E/M 30-74 MINUTES: ICD-10-PCS | Mod: ,,, | Performed by: EMERGENCY MEDICINE

## 2019-04-12 PROCEDURE — 99285 EMERGENCY DEPT VISIT HI MDM: CPT | Mod: 25,HCNC

## 2019-04-12 PROCEDURE — 80053 COMPREHEN METABOLIC PANEL: CPT | Mod: 91,HCNC

## 2019-04-12 RX ORDER — SODIUM CHLORIDE 0.9 % (FLUSH) 0.9 %
10 SYRINGE (ML) INJECTION
Status: DISCONTINUED | OUTPATIENT
Start: 2019-04-12 | End: 2019-04-15 | Stop reason: HOSPADM

## 2019-04-12 RX ORDER — VANCOMYCIN HCL IN 5 % DEXTROSE 1G/250ML
20 PLASTIC BAG, INJECTION (ML) INTRAVENOUS ONCE
Status: DISCONTINUED | OUTPATIENT
Start: 2019-04-12 | End: 2019-04-12

## 2019-04-12 RX ORDER — ONDANSETRON 4 MG/1
4 TABLET, ORALLY DISINTEGRATING ORAL EVERY 8 HOURS PRN
Status: DISCONTINUED | OUTPATIENT
Start: 2019-04-12 | End: 2019-04-15 | Stop reason: HOSPADM

## 2019-04-12 RX ORDER — SODIUM CHLORIDE 0.9 % (FLUSH) 0.9 %
10 SYRINGE (ML) INJECTION
Status: CANCELLED | OUTPATIENT
Start: 2019-04-12

## 2019-04-12 RX ORDER — GLUCAGON 1 MG
1 KIT INJECTION
Status: DISCONTINUED | OUTPATIENT
Start: 2019-04-12 | End: 2019-04-15 | Stop reason: HOSPADM

## 2019-04-12 RX ORDER — ACETAMINOPHEN 325 MG/1
650 TABLET ORAL
Status: DISPENSED | OUTPATIENT
Start: 2019-04-12 | End: 2019-04-13

## 2019-04-12 RX ORDER — RAMELTEON 8 MG/1
8 TABLET ORAL NIGHTLY PRN
Status: DISCONTINUED | OUTPATIENT
Start: 2019-04-12 | End: 2019-04-15 | Stop reason: HOSPADM

## 2019-04-12 RX ORDER — IPRATROPIUM BROMIDE AND ALBUTEROL SULFATE 2.5; .5 MG/3ML; MG/3ML
3 SOLUTION RESPIRATORY (INHALATION) EVERY 4 HOURS PRN
Status: DISCONTINUED | OUTPATIENT
Start: 2019-04-12 | End: 2019-04-15 | Stop reason: HOSPADM

## 2019-04-12 RX ORDER — IBUPROFEN 200 MG
16 TABLET ORAL
Status: DISCONTINUED | OUTPATIENT
Start: 2019-04-12 | End: 2019-04-15 | Stop reason: HOSPADM

## 2019-04-12 RX ORDER — ACETAMINOPHEN 325 MG/1
650 TABLET ORAL EVERY 4 HOURS PRN
Status: DISCONTINUED | OUTPATIENT
Start: 2019-04-12 | End: 2019-04-15 | Stop reason: HOSPADM

## 2019-04-12 RX ORDER — IBUPROFEN 200 MG
24 TABLET ORAL
Status: DISCONTINUED | OUTPATIENT
Start: 2019-04-12 | End: 2019-04-15 | Stop reason: HOSPADM

## 2019-04-12 RX ORDER — VANCOMYCIN HCL IN 5 % DEXTROSE 1G/250ML
20 PLASTIC BAG, INJECTION (ML) INTRAVENOUS ONCE
Status: COMPLETED | OUTPATIENT
Start: 2019-04-12 | End: 2019-04-13

## 2019-04-12 RX ADMIN — IOHEXOL 100 ML: 350 INJECTION, SOLUTION INTRAVENOUS at 02:04

## 2019-04-12 RX ADMIN — SODIUM CHLORIDE 1632 ML: 0.9 INJECTION, SOLUTION INTRAVENOUS at 07:04

## 2019-04-12 RX ADMIN — AMPICILLIN SODIUM AND SULBACTAM SODIUM 3 G: 2; 1 INJECTION, POWDER, FOR SOLUTION INTRAMUSCULAR; INTRAVENOUS at 08:04

## 2019-04-12 NOTE — ED PROVIDER NOTES
Encounter Date: 4/12/2019       History     Chief Complaint   Patient presents with    Dysphagia     pt from [facility]. left neck mass with trouble swallowing.     Hypotension     Time patient was seen by the provider: 6:48 PM      The patient is a 63 y.o. female with co-morbidities including: *** who presents to the ED with a complaint of    appromxiately one month ear drum pain that has traveled down her neck   10/10 pain   Difficulty swallowing; hasn't been eating as much   Lost some weight   Had a CAT scan performed on her this morning  No fevers; always feel cold   No belly pain, chest pain  Actively coughing   No pain when urinating   Lives in a living facility  No allergies         Review of patient's allergies indicates:  No Known Allergies  Past Medical History:   Diagnosis Date    Anxiety     Dementia     Depression     Hx of psychiatric care     Hypertension     Psychiatric problem     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    Seizures     Therapy     Transient loss of consciousness 9/28/2012    presumed seizures     Past Surgical History:   Procedure Laterality Date    BLOCK STELLATE GANGLION Right 12/22/2017    [facility]    BLOCK-STELLATE GANGLION Right 8/1/2017    [facility] by [facility] at [facility]     No family history on file.  Social History     Tobacco Use    Smoking status: Never Smoker    Smokeless tobacco: Never Used   Substance Use Topics    Alcohol use: Yes     Alcohol/week: 1.0 oz     Types: 2 Standard drinks or equivalent per week    Drug use: No     Review of Systems    Physical Exam     Initial Vitals [04/12/19 1832]   BP Pulse Resp Temp SpO2   (S) (!) 81/53 100 17 98.1 °F (36.7 °C) 96 %      MAP       --         Physical Exam    ED Course   Procedures  Labs Reviewed - No data to display       Imaging Results    None                               Clinical Impression:   {Add your Clinical Impression here.  If you haven't documented one yet, please pend the note, finalize a Clinical Impression, and refresh your note before signing.:[id number]}

## 2019-04-12 NOTE — TELEPHONE ENCOUNTER
Dr. Donaldson with radiology called to report CT shows abscess in the neck similar to previous study. Radiologist stated this message is not a STAT message but did want to report findings. Message routed to MD.

## 2019-04-12 NOTE — ED TRIAGE NOTES
Pt presents to the ED with complaints of neck pain and difficulty swallowing that started about a month ago as just inner ear pain .      Patient identifiers verified and correct for Capri Cousins.   LOC: The patient is awake, alert and aware of environment with an appropriate affect, the patient is oriented x 3 and speaking appropriately.   APPEARANCE: Patient appears comfortable and in no acute distress, patient is clean and well groomed.  SKIN: The skin is warm and dry, color consistent with ethnicity, patient has normal skin turgor and moist mucus membranes, skin intact, no breakdown or bruising noted.   MUSCULOSKELETAL: Patient moving all extremities spontaneously, swelling noted to left side of neck, limited range of motion.  RESPIRATORY: Airway is open and patent, respirations are spontaneous, patient has a normal effort and rate, no accessory muscle use noted, pt placed on continuous pulse ox with O2 sats noted at 97% on room air. Pt reports painful cough, but no SOB.  CARDIAC: Pt placed on cardiac monitor. Patient has a normal rate and regular rhythm, no edema noted, capillary refill < 3 seconds.   GASTRO: Soft and non tender to palpation, no distention noted, normoactive bowel sounds present in all four quadrants. Pt states bowel movements have been regular. Pt  States it has been more difficult to eat and drink and she has lost some weight.  : Pt denies any pain or frequency with urination.  NEURO: Pt opens eyes spontaneously, behavior appropriate to situation, follows commands, facial expression symmetrical, bilateral hand grasp equal and even, purposeful motor response noted, normal sensation in all extremities when touched with a finger.

## 2019-04-12 NOTE — TELEPHONE ENCOUNTER
I spoke with Dr Donaldson in radiology who says CT suggests neck abscess. This is likely cause of pt's pain and needs to go to ED. Already spoke with nurse who has advised pt.

## 2019-04-13 PROBLEM — R22.1 NECK MASS: Status: ACTIVE | Noted: 2019-04-13

## 2019-04-13 PROBLEM — Z66 DNR (DO NOT RESUSCITATE): Status: ACTIVE | Noted: 2019-04-13

## 2019-04-13 PROBLEM — E86.1 HYPOTENSION DUE TO HYPOVOLEMIA: Status: ACTIVE | Noted: 2019-04-13

## 2019-04-13 PROBLEM — D64.9 ANEMIA: Status: ACTIVE | Noted: 2019-04-13

## 2019-04-13 PROBLEM — I95.9 HYPOTENSION, UNSPECIFIED: Status: ACTIVE | Noted: 2019-04-13

## 2019-04-13 PROBLEM — R13.10 ODYNOPHAGIA: Status: ACTIVE | Noted: 2019-04-13

## 2019-04-13 LAB
ALBUMIN SERPL BCP-MCNC: 2.7 G/DL (ref 3.5–5.2)
ALBUMIN SERPL BCP-MCNC: 3.2 G/DL (ref 3.5–5.2)
ALP SERPL-CCNC: 58 U/L (ref 55–135)
ALP SERPL-CCNC: 64 U/L (ref 55–135)
ALT SERPL W/O P-5'-P-CCNC: <5 U/L (ref 10–44)
ALT SERPL W/O P-5'-P-CCNC: <5 U/L (ref 10–44)
ANION GAP SERPL CALC-SCNC: 16 MMOL/L (ref 8–16)
ANION GAP SERPL CALC-SCNC: 17 MMOL/L (ref 8–16)
ASCENDING AORTA: 2.73 CM
AST SERPL-CCNC: 12 U/L (ref 10–40)
AST SERPL-CCNC: 9 U/L (ref 10–40)
AV INDEX (PROSTH): 0.95
AV MEAN GRADIENT: 2.66 MMHG
AV PEAK GRADIENT: 4.33 MMHG
AV VALVE AREA: 2.93 CM2
AV VELOCITY RATIO: 0.97
BASOPHILS # BLD AUTO: 0.03 K/UL (ref 0–0.2)
BASOPHILS NFR BLD: 0.6 % (ref 0–1.9)
BILIRUB SERPL-MCNC: 0.3 MG/DL (ref 0.1–1)
BILIRUB SERPL-MCNC: 0.3 MG/DL (ref 0.1–1)
BSA FOR ECHO PROCEDURE: 1.59 M2
BUN SERPL-MCNC: 47 MG/DL (ref 8–23)
BUN SERPL-MCNC: 50 MG/DL (ref 8–23)
CALCIUM SERPL-MCNC: 7.1 MG/DL (ref 8.7–10.5)
CALCIUM SERPL-MCNC: 7.7 MG/DL (ref 8.7–10.5)
CHLORIDE SERPL-SCNC: 102 MMOL/L (ref 95–110)
CHLORIDE SERPL-SCNC: 103 MMOL/L (ref 95–110)
CO2 SERPL-SCNC: 20 MMOL/L (ref 23–29)
CO2 SERPL-SCNC: 20 MMOL/L (ref 23–29)
CREAT SERPL-MCNC: 5 MG/DL (ref 0.5–1.4)
CREAT SERPL-MCNC: 5.3 MG/DL (ref 0.5–1.4)
CRP SERPL-MCNC: 18.9 MG/L (ref 0–8.2)
CV ECHO LV RWT: 0.38 CM
DIFFERENTIAL METHOD: ABNORMAL
DOP CALC AO PEAK VEL: 1.04 M/S
DOP CALC AO VTI: 17.27 CM
DOP CALC LVOT AREA: 3.08 CM2
DOP CALC LVOT DIAMETER: 1.98 CM
DOP CALC LVOT PEAK VEL: 1.01 M/S
DOP CALC LVOT STROKE VOLUME: 50.56 CM3
DOP CALCLVOT PEAK VEL VTI: 16.43 CM
E WAVE DECELERATION TIME: 254.52 MSEC
E/A RATIO: 0.92
E/E' RATIO: 7.3
ECHO LV POSTERIOR WALL: 0.86 CM (ref 0.6–1.1)
EOSINOPHIL # BLD AUTO: 0.2 K/UL (ref 0–0.5)
EOSINOPHIL NFR BLD: 3 % (ref 0–8)
ERYTHROCYTE [DISTWIDTH] IN BLOOD BY AUTOMATED COUNT: 13.1 % (ref 11.5–14.5)
ERYTHROCYTE [SEDIMENTATION RATE] IN BLOOD BY WESTERGREN METHOD: 19 MM/HR (ref 0–36)
EST. GFR  (AFRICAN AMERICAN): 9.2 ML/MIN/1.73 M^2
EST. GFR  (AFRICAN AMERICAN): 9.9 ML/MIN/1.73 M^2
EST. GFR  (NON AFRICAN AMERICAN): 8 ML/MIN/1.73 M^2
EST. GFR  (NON AFRICAN AMERICAN): 8.6 ML/MIN/1.73 M^2
FERRITIN SERPL-MCNC: 202 NG/ML (ref 20–300)
FOLATE SERPL-MCNC: 11.6 NG/ML (ref 4–24)
FRACTIONAL SHORTENING: 35 % (ref 28–44)
GLUCOSE SERPL-MCNC: 103 MG/DL (ref 70–110)
GLUCOSE SERPL-MCNC: 81 MG/DL (ref 70–110)
HCT VFR BLD AUTO: 31.6 % (ref 37–48.5)
HGB BLD-MCNC: 10.2 G/DL (ref 12–16)
IMM GRANULOCYTES # BLD AUTO: 0.02 K/UL (ref 0–0.04)
IMM GRANULOCYTES NFR BLD AUTO: 0.4 % (ref 0–0.5)
INTERVENTRICULAR SEPTUM: 0.43 CM (ref 0.6–1.1)
IRON SERPL-MCNC: 54 UG/DL (ref 30–160)
LA MAJOR: 4.27 CM
LA MINOR: 3.23 CM
LA WIDTH: 3.11 CM
LACTATE SERPL-SCNC: 0.9 MMOL/L (ref 0.5–2.2)
LEFT ATRIUM SIZE: 2.75 CM
LEFT ATRIUM VOLUME INDEX: 16.6 ML/M2
LEFT ATRIUM VOLUME: 26.74 CM3
LEFT INTERNAL DIMENSION IN SYSTOLE: 2.93 CM (ref 2.1–4)
LEFT VENTRICLE DIASTOLIC VOLUME INDEX: 56.71 ML/M2
LEFT VENTRICLE DIASTOLIC VOLUME: 91.29 ML
LEFT VENTRICLE MASS INDEX: 53.2 G/M2
LEFT VENTRICLE SYSTOLIC VOLUME INDEX: 20.6 ML/M2
LEFT VENTRICLE SYSTOLIC VOLUME: 33.09 ML
LEFT VENTRICULAR INTERNAL DIMENSION IN DIASTOLE: 4.48 CM (ref 3.5–6)
LEFT VENTRICULAR MASS: 85.62 G
LV LATERAL E/E' RATIO: 6.64
LV SEPTAL E/E' RATIO: 8.11
LYMPHOCYTES # BLD AUTO: 1.2 K/UL (ref 1–4.8)
LYMPHOCYTES NFR BLD: 22.9 % (ref 18–48)
MAGNESIUM SERPL-MCNC: 1 MG/DL (ref 1.6–2.6)
MAGNESIUM SERPL-MCNC: 1.1 MG/DL (ref 1.6–2.6)
MCH RBC QN AUTO: 33.4 PG (ref 27–31)
MCHC RBC AUTO-ENTMCNC: 32.3 G/DL (ref 32–36)
MCV RBC AUTO: 104 FL (ref 82–98)
MONOCYTES # BLD AUTO: 0.6 K/UL (ref 0.3–1)
MONOCYTES NFR BLD: 10.6 % (ref 4–15)
MV PEAK A VEL: 0.79 M/S
MV PEAK E VEL: 0.73 M/S
NEUTROPHILS # BLD AUTO: 3.3 K/UL (ref 1.8–7.7)
NEUTROPHILS NFR BLD: 62.5 % (ref 38–73)
NRBC BLD-RTO: 0 /100 WBC
PHOSPHATE SERPL-MCNC: 6.1 MG/DL (ref 2.7–4.5)
PHOSPHATE SERPL-MCNC: 6.3 MG/DL (ref 2.7–4.5)
PLATELET # BLD AUTO: 214 K/UL (ref 150–350)
PMV BLD AUTO: 10.7 FL (ref 9.2–12.9)
POCT GLUCOSE: 106 MG/DL (ref 70–110)
POCT GLUCOSE: 134 MG/DL (ref 70–110)
POCT GLUCOSE: 146 MG/DL (ref 70–110)
POTASSIUM SERPL-SCNC: 3.4 MMOL/L (ref 3.5–5.1)
POTASSIUM SERPL-SCNC: 3.9 MMOL/L (ref 3.5–5.1)
PROT SERPL-MCNC: 5.5 G/DL (ref 6–8.4)
PROT SERPL-MCNC: 6.4 G/DL (ref 6–8.4)
RA MAJOR: 3.92 CM
RA PRESSURE: 15 MMHG
RA WIDTH: 3.03 CM
RBC # BLD AUTO: 3.05 M/UL (ref 4–5.4)
RIGHT VENTRICULAR END-DIASTOLIC DIMENSION: 2.88 CM
RPR SER QL: NORMAL
SATURATED IRON: 28 % (ref 20–50)
SINUS: 2.42 CM
SODIUM SERPL-SCNC: 139 MMOL/L (ref 136–145)
SODIUM SERPL-SCNC: 139 MMOL/L (ref 136–145)
STJ: 2.52 CM
TDI LATERAL: 0.11
TDI SEPTAL: 0.09
TDI: 0.1
TOTAL IRON BINDING CAPACITY: 191 UG/DL (ref 250–450)
TRANSFERRIN SERPL-MCNC: 129 MG/DL (ref 200–375)
TRICUSPID ANNULAR PLANE SYSTOLIC EXCURSION: 1.89 CM
VIT B12 SERPL-MCNC: 1810 PG/ML (ref 210–950)
WBC # BLD AUTO: 5.29 K/UL (ref 3.9–12.7)

## 2019-04-13 PROCEDURE — 83735 ASSAY OF MAGNESIUM: CPT | Mod: HCNC

## 2019-04-13 PROCEDURE — 36415 COLL VENOUS BLD VENIPUNCTURE: CPT | Mod: HCNC

## 2019-04-13 PROCEDURE — 99222 PR INITIAL HOSPITAL CARE,LEVL II: ICD-10-PCS | Mod: AI,HCNC,GC, | Performed by: INTERNAL MEDICINE

## 2019-04-13 PROCEDURE — 85025 COMPLETE CBC W/AUTO DIFF WBC: CPT | Mod: HCNC

## 2019-04-13 PROCEDURE — 63600175 PHARM REV CODE 636 W HCPCS: Mod: HCNC | Performed by: STUDENT IN AN ORGANIZED HEALTH CARE EDUCATION/TRAINING PROGRAM

## 2019-04-13 PROCEDURE — 25000003 PHARM REV CODE 250: Mod: HCNC | Performed by: STUDENT IN AN ORGANIZED HEALTH CARE EDUCATION/TRAINING PROGRAM

## 2019-04-13 PROCEDURE — 84540 ASSAY OF URINE/UREA-N: CPT | Mod: HCNC

## 2019-04-13 PROCEDURE — 82570 ASSAY OF URINE CREATININE: CPT | Mod: HCNC

## 2019-04-13 PROCEDURE — 80053 COMPREHEN METABOLIC PANEL: CPT | Mod: HCNC

## 2019-04-13 PROCEDURE — 82436 ASSAY OF URINE CHLORIDE: CPT | Mod: HCNC

## 2019-04-13 PROCEDURE — 92610 EVALUATE SWALLOWING FUNCTION: CPT | Mod: HCNC

## 2019-04-13 PROCEDURE — 84100 ASSAY OF PHOSPHORUS: CPT | Mod: HCNC

## 2019-04-13 PROCEDURE — 83540 ASSAY OF IRON: CPT | Mod: HCNC

## 2019-04-13 PROCEDURE — 82746 ASSAY OF FOLIC ACID SERUM: CPT | Mod: HCNC

## 2019-04-13 PROCEDURE — 11000001 HC ACUTE MED/SURG PRIVATE ROOM: Mod: HCNC

## 2019-04-13 PROCEDURE — 82728 ASSAY OF FERRITIN: CPT | Mod: HCNC

## 2019-04-13 PROCEDURE — 84300 ASSAY OF URINE SODIUM: CPT | Mod: HCNC

## 2019-04-13 PROCEDURE — 99222 1ST HOSP IP/OBS MODERATE 55: CPT | Mod: AI,HCNC,GC, | Performed by: INTERNAL MEDICINE

## 2019-04-13 PROCEDURE — 84133 ASSAY OF URINE POTASSIUM: CPT | Mod: HCNC

## 2019-04-13 PROCEDURE — 82607 VITAMIN B-12: CPT | Mod: HCNC

## 2019-04-13 RX ORDER — LANOLIN ALCOHOL/MO/W.PET/CERES
1000 CREAM (GRAM) TOPICAL DAILY
Status: DISCONTINUED | OUTPATIENT
Start: 2019-04-13 | End: 2019-04-15 | Stop reason: HOSPADM

## 2019-04-13 RX ORDER — SODIUM CHLORIDE, SODIUM LACTATE, POTASSIUM CHLORIDE, CALCIUM CHLORIDE 600; 310; 30; 20 MG/100ML; MG/100ML; MG/100ML; MG/100ML
125 INJECTION, SOLUTION INTRAVENOUS CONTINUOUS
Status: DISCONTINUED | OUTPATIENT
Start: 2019-04-13 | End: 2019-04-15

## 2019-04-13 RX ORDER — ENOXAPARIN SODIUM 100 MG/ML
30 INJECTION SUBCUTANEOUS EVERY 24 HOURS
Status: DISCONTINUED | OUTPATIENT
Start: 2019-04-13 | End: 2019-04-15

## 2019-04-13 RX ORDER — B-COMPLEX WITH VITAMIN C
1 TABLET ORAL DAILY
Status: DISCONTINUED | OUTPATIENT
Start: 2019-04-13 | End: 2019-04-15 | Stop reason: HOSPADM

## 2019-04-13 RX ORDER — SODIUM BICARBONATE 650 MG/1
650 TABLET ORAL DAILY
Status: DISCONTINUED | OUTPATIENT
Start: 2019-04-13 | End: 2019-04-15

## 2019-04-13 RX ORDER — FOLIC ACID 1 MG/1
1000 TABLET ORAL DAILY
Status: DISCONTINUED | OUTPATIENT
Start: 2019-04-13 | End: 2019-04-15 | Stop reason: HOSPADM

## 2019-04-13 RX ORDER — DIAZEPAM 2 MG/1
2 TABLET ORAL EVERY 8 HOURS PRN
Status: DISCONTINUED | OUTPATIENT
Start: 2019-04-13 | End: 2019-04-15 | Stop reason: HOSPADM

## 2019-04-13 RX ORDER — GABAPENTIN 300 MG/1
300 CAPSULE ORAL NIGHTLY
Status: DISCONTINUED | OUTPATIENT
Start: 2019-04-13 | End: 2019-04-13

## 2019-04-13 RX ORDER — SODIUM CHLORIDE 9 MG/ML
INJECTION, SOLUTION INTRAVENOUS CONTINUOUS
Status: DISCONTINUED | OUTPATIENT
Start: 2019-04-13 | End: 2019-04-13

## 2019-04-13 RX ORDER — LEVETIRACETAM 500 MG/1
500 TABLET ORAL 2 TIMES DAILY
Status: DISCONTINUED | OUTPATIENT
Start: 2019-04-13 | End: 2019-04-15

## 2019-04-13 RX ORDER — LEVETIRACETAM 500 MG/1
1000 TABLET ORAL 2 TIMES DAILY
Status: DISCONTINUED | OUTPATIENT
Start: 2019-04-13 | End: 2019-04-13

## 2019-04-13 RX ORDER — CEFTRIAXONE 1 G/1
1 INJECTION, POWDER, FOR SOLUTION INTRAMUSCULAR; INTRAVENOUS
Status: DISCONTINUED | OUTPATIENT
Start: 2019-04-13 | End: 2019-04-15 | Stop reason: HOSPADM

## 2019-04-13 RX ADMIN — VITAMIN C 1 TABLET: TAB at 10:04

## 2019-04-13 RX ADMIN — FOLIC ACID 1000 MCG: 1 TABLET ORAL at 10:04

## 2019-04-13 RX ADMIN — SODIUM CHLORIDE: 0.9 INJECTION, SOLUTION INTRAVENOUS at 04:04

## 2019-04-13 RX ADMIN — CEFTRIAXONE SODIUM 1 G: 1 INJECTION, POWDER, FOR SOLUTION INTRAMUSCULAR; INTRAVENOUS at 12:04

## 2019-04-13 RX ADMIN — GABAPENTIN 300 MG: 300 CAPSULE ORAL at 04:04

## 2019-04-13 RX ADMIN — VANCOMYCIN HYDROCHLORIDE 1000 MG: 1 INJECTION, POWDER, LYOPHILIZED, FOR SOLUTION INTRAVENOUS at 02:04

## 2019-04-13 RX ADMIN — PIPERACILLIN AND TAZOBACTAM 4.5 G: 4; .5 INJECTION, POWDER, LYOPHILIZED, FOR SOLUTION INTRAVENOUS; PARENTERAL at 01:04

## 2019-04-13 RX ADMIN — DIAZEPAM 2 MG: 2 TABLET ORAL at 09:04

## 2019-04-13 RX ADMIN — RAMELTEON 8 MG: 8 TABLET, FILM COATED ORAL at 09:04

## 2019-04-13 RX ADMIN — SODIUM BICARBONATE 650 MG TABLET 650 MG: at 12:04

## 2019-04-13 RX ADMIN — SODIUM CHLORIDE, SODIUM LACTATE, POTASSIUM CHLORIDE, AND CALCIUM CHLORIDE 125 ML/HR: .6; .31; .03; .02 INJECTION, SOLUTION INTRAVENOUS at 12:04

## 2019-04-13 RX ADMIN — CYANOCOBALAMIN TAB 1000 MCG 1000 MCG: 1000 TAB at 10:04

## 2019-04-13 RX ADMIN — LEVETIRACETAM 1000 MG: 500 TABLET, FILM COATED ORAL at 09:04

## 2019-04-13 RX ADMIN — ENOXAPARIN SODIUM 30 MG: 100 INJECTION SUBCUTANEOUS at 05:04

## 2019-04-13 RX ADMIN — LEVETIRACETAM 500 MG: 500 TABLET, FILM COATED ORAL at 09:04

## 2019-04-13 NOTE — ASSESSMENT & PLAN NOTE
- 1 month hx of ear pain, difficulty swallowing and left side swelling. Animal exposure - Cat (pet)  - FHx of breast Ca.    MRI- brain  03/25- questioned edema and enhancement in the left infratemporal fossa/parapharyngeal space partially visualized cannot exclude underlying lesion  CT-Neck 04/12- there is an abscess in the left pharyngeal mucosal space, with some mild mass effect on the airway.  Although imaged in a different modality today, extent appears fairly similar to prior study.    - ENT evaluated the patient in ED - No acute ENT intervention at this time/ schedule close follow-up early next week to arrange for DL with biopsy - [facility]    DDx: Infectious causes vs malignancy     Plan:  1- Follow up HIV/ RPR/ Q-TB/Toxo/CMV/EBV/ Bartonella.  2- Follow up blood and urine clx.  3- NPO for now - Speech team consulted appreciate their help.  4- Continue broad spectrum Abx with Vancomycin and Zosyn. Can deescalate in AM. Please follow up vancomycin random Q12pm.  5- Tissue biopsy for definitive diagnosis.

## 2019-04-13 NOTE — ASSESSMENT & PLAN NOTE
- Pt with no hx of kidney disease, presenting with 1 month hx of decrease oral intake and weight loss.    - Exam: SBP ~80's / Hr 100.  - Labs: WBC WNL - BUN/Crt 49/6.1 - Lactate 2.3.  - Images: CXR-  Negative / U/S kidney- unremarkable.    ** Most likely Pre-renal due to hypovolemia Vs ATN from hypotension.     Plan:  1- Follow up urine lytes.  2- S/P 30cc /kg + 500cc IVF, follow up Crt post IVF.  3- Strict In/out-  4- Avoid nephrotoxic medication.  5- Nephrology consulted appreciate their help.

## 2019-04-13 NOTE — PLAN OF CARE
Problem: SLP Goal  Goal: SLP Goal  Speech Language Pathology Goals  Goals expected to be met by 4/20  1. Pt will tolerate dental soft diet with thin liquids without overt s/s aspiration.       Outcome: Ongoing (interventions implemented as appropriate)  Rec continue dental soft diet with thin liquids with strict aspiration precautions.  LORRAINE Dunne, CCC/SLP  4/13/2019

## 2019-04-13 NOTE — CARE UPDATE
"Notified by nursing that patient refused afternoon labs and is also requesting a "happy pill". Will continue medical therapy without PM labs. Do not know what a "happy pill" is, no new orders the moment.     Ye Vance MD  Resident Physician, PGY1    "

## 2019-04-13 NOTE — PT/OT/SLP EVAL
"Speech Language Pathology Evaluation  Bedside Swallow    Patient Name:  Breonna Silva   MRN:  [id number]  Admitting Diagnosis: Acute renal failure    Recommendations:                 General Recommendations:  Diet f/u x1  Diet recommendations:  Dental Soft, Thin   Aspiration Precautions: 1 bite/sip at a time, Avoid talking while eating, HOB to 90 degrees, Meds whole 1 at a time, Small bites/sips and Strict aspiration precautions   General Precautions: Standard, aspiration, fall  Communication strategies:  none    History:     Past Medical History:   Diagnosis Date    Anxiety     Dementia     Depression     Hx of psychiatric care     Hypertension     Psychiatric problem     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    Seizures     Therapy     Transient loss of consciousness 9/28/2012    presumed seizures       Past Surgical History:   Procedure Laterality Date    BLOCK STELLATE GANGLION Right 12/22/2017    [facility]formed by [facility] at [facility]    BLOCK-STELLATE GANGLION Right 8/1/2017    Performed by [facility] at [facility]       Social History: Patient lives in an assisted living.  She reports a several month h/o odynophagia and intense ear pain.  She endorses significant weight loss and decrease tolerance of cold liquids.  She denied globus sensation though did report some coughing.  She eats soft foods only 2/2 pain.     Prior Intubation HX:  None this admission    Modified Barium Swallow: none reported    Chest X-Rays: 4/12: No detrimental change or radiographic acute intrathoracic process seen.  Specifically, no focal consolidation.    Subjective     "It's actually a lot better since I've been here but it's like this constant pain right here (gesturing to L side of neck just under jaw) that never goes away."      Pain/Comfort:  · Pain Rating 1: (number not provided "much better than before but constant nagging pain" )  · Location - Side 1: Left  · Location 1: throat  · Pain " Addressed 1: Distraction  · Pain Rating Post-Intervention 1: (no change)    Objective:     Oral Musculature Evaluation  · Oral Musculature: WFL  · Dentition: present and adequate  · Secretion Management: adequate  · Mucosal Quality: adequate  · Mandibular Strength and Mobility: WFL  · Oral Labial Strength and Mobility: WFL  · Lingual Strength and Mobility: WFL  · Buccal Strength and Mobility: WFL  · Volitional Cough: WFL  · Volitional Swallow: odynophagia, adequate hyolaryngeal rise  · Voice Prior to PO Intake: clear  · Oral Musculature Comments: mild dysarthria    Bedside Swallow Eval:   Consistencies Assessed:  · Thin liquids 3 oz via spoon, cup, straw  · Puree tsp x5  · Solids cinnamon roll portion x1    Oral Phase:   · Excess chewing    Pharyngeal Phase:   · coughing-delayed x1 with puree   · No overt s/s aspiration with thin or solids  · Pt denied globus sensation  · Unclear whether cough was related to penetration of stasis or habitual, SLP to f/u to ensure tolerance    Compensatory Strategies  · None    Treatment: Education provided on general swallow precs, diet recs and role and POC of SLP.  Pt indicated understanding.     Assessment:     Breonna Silva is a 63 y.o. female with an SLP diagnosis of mild Dysphagia.  She presents with increased odynophagia likely related to neck mass.    Goals:   Multidisciplinary Problems     SLP Goals        Problem: SLP Goal    Goal Priority Disciplines Outcome   SLP Goal     SLP Ongoing (interventions implemented as appropriate)   Description:  Speech Language Pathology Goals  Goals expected to be met by 4/20  1. Pt will tolerate dental soft diet with thin liquids without overt s/s aspiration.                         Plan:     · Patient to be seen:  3 x/week   · Plan of Care expires:     · Plan of Care reviewed with:  patient   · SLP Follow-Up:  Yes       Discharge recommendations:  (return to assisted living)   Barriers to Discharge:  None    Time Tracking:     SLP  Treatment Date:   04/13/19  Speech Start Time:  0920  Speech Stop Time:  0936     Speech Total Time (min):  16 min    Billable Minutes: Eval Swallow and Oral Function 16    LORRAINE Dunne, CCC-SLP  04/13/2019

## 2019-04-13 NOTE — HPI
This is a 63 y.o. female with  hx of seizures, HTN, and secondary parkinsonism, dementia, who presents to the ED with a complaint of strong neck pain with associated symptoms of left ear pain, active cough, and trouble swallowing, for the last month.     Patient also reports having lost weight and not eating or drinking as much as she usually does. Patient denies fever, belly pain, chest pain, abdominal pain, shortness of breath and pain while urinating. Denies any use of NSAIDs.    Social: Lives alone per pt and , she is independent in daily living. Animal exposure- Cat (pet). All her family member including her  has passed away, She is her power of  but in case pt lost her Capacity her Power  is Ms. mary Garcia [phone].  FHX: Mother  from breast Ca at age 32.    Screening test: Last coloscopy  (NL per pt) - never had mammogram done.    In ED, She was hypotensive, given 30 cc/kg NS + Unycn x1, ENT evaluated the pt which they did not feel the mass is an abscess, recommended outpt follow up. Pt was admitted to hospital medicine for acute renal failure.

## 2019-04-13 NOTE — ASSESSMENT & PLAN NOTE
- State she drinks every day 1-2 glasses but has not been drinking for the last month due to her odynophagia.

## 2019-04-13 NOTE — ED PROVIDER NOTES
Encounter Date: 4/12/2019    SCRIBE #1 NOTE: I, Sushma Hernandez , am scribing for, and in the presence of,  Matthew Borjas MD. I have scribed the entire note.       History     Chief Complaint   Patient presents with    Dysphagia     pt from [facility]. left neck mass with trouble swallowing.     Hypotension     Time patient was seen by the provider: 6:48 PM      The patient is a 63 y.o. female with co-morbidities including: hx of seizures, HTN, and secondary parkinsonism, dementia, who presents to the ED with a complaint of strong neck pain with associated symptoms of left ear pain, active cough, and trouble swallowing, for the last month. Patient also reports having lost weight and not eating or drinking as much as she usually does. Patient had a CT scan performed earlier today chief concern for abscess versus mass. Patient denies fever, belly pain, chest pain, abdominal pain, shortness of breath and pain while urinating. Patient lives in a assisted living facility.    She is a power of   Caroline Garcia [phone]    The history is provided by the patient.     Review of patient's allergies indicates:  No Known Allergies  Past Medical History:   Diagnosis Date    Anxiety     Dementia     Depression     Hx of psychiatric care     Hypertension     Psychiatric problem     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    Seizures     Therapy     Transient loss of consciousness 9/28/2012    presumed seizures     Past Surgical History:   Procedure Laterality Date    BLOCK STELLATE GANGLION Right 12/22/2017    Performed by [facility]    BLOCK-STELLATE GANGLION Right 8/1/2017    Performed by [facility] at [facility]     No family history on file.  Social History     Tobacco Use    Smoking status: Never Smoker    Smokeless tobacco: Never Used   Substance Use Topics    Alcohol use: Yes     Alcohol/week: 1.0 oz     Types: 2 Standard drinks or equivalent  per week    Drug use: No     Review of Systems   Constitutional:  No Fever, No Chills, Change in Diet  Eyes: No Vision Changes  ENT/Mouth: No rhinorrhea. Left ear pain. Trouble swallowing.  Cardiovascular:  No Chest Pain, No Palpitations  Respiratory:  Active Cough, No SOB  Gastrointestinal:  No Nausea, No Vomiting, No Diarrhea, No abdo pain.  Genitourinary:  No  pain, No dysuria   Musculoskeletal:  No Arthralgias, No Back Pain, + Neck Pain, No recent trauma.  Skin:  No skin Lesions  Neuro:  No Weakness, No Numbness, No Paresthesias, No Dizziness, No Headache    Physical Exam     Initial Vitals [04/12/19 1832]   BP Pulse Resp Temp SpO2   (S) (!) 81/53 100 17 98.1 °F (36.7 °C) 96 %      MAP       --         Physical Exam Physical Exam:  GENERAL APPEARANCE: Frail appearing.   HENT: Normocephalic, atraumatic. Right TM is unremarkable. Left TM is remarkable for mild erythema with clear fluid.  Left throat with a open necrotic draining mass.   EYES: Sclerae anicteric   NECK: Supple, no thyroid enlargement. No focal neck tenderness or swelling.  CARDIOVASCULAR: Regular rate and rhythm without any murmurs, gallops, rubs.  LUNGS: Speaking in full sentences. Breathing comfortably. Auscultation of the lungs revealed normal breath sounds b/l   ABDOMEN: Soft and nontender, no masses, no rebound or guarding   NEUROLOGIC: Alert, interacting normally. No facial droop.   MSK: Moving all four extremities.  Skin: Warm and dry. No visible rash on exposed areas of skin.    Psych: Mood and affect normal.       ED Course   Procedures  Labs Reviewed   CBC W/ AUTO DIFFERENTIAL - Abnormal; Notable for the following components:       Result Value    RBC 3.27 (*)     Hemoglobin 11.0 (*)     Hematocrit 34.2 (*)      (*)     MCH 33.6 (*)     Gran% 74.5 (*)     Lymph% 15.8 (*)     All other components within normal limits   COMPREHENSIVE METABOLIC PANEL - Abnormal; Notable for the following components:    CO2 20 (*)     BUN, Bld 49 (*)      Creatinine 6.1 (*)     Calcium 8.4 (*)     ALT 7 (*)     Anion Gap 23 (*)     eGFR if  7.8 (*)     eGFR if non  6.8 (*)     All other components within normal limits   LACTIC ACID, PLASMA - Abnormal; Notable for the following components:    Lactate (Lactic Acid) 2.3 (*)     All other components within normal limits   CULTURE, BLOOD   CULTURE, BLOOD   URINALYSIS, REFLEX TO URINE CULTURE          Imaging Results          X-Ray Chest AP Portable (Final result)  Result time 04/12/19 19:57:16    Final result by Javid Garcia MD (04/12/19 19:57:16)                 Impression:      No detrimental change or radiographic acute intrathoracic process seen.  Specifically, no focal consolidation.      Electronically signed by: Javid Garcia MD  Date:    04/12/2019  Time:    19:57             Narrative:    EXAMINATION:  XR CHEST AP PORTABLE    CLINICAL HISTORY:  Sepsis;    TECHNIQUE:  Single frontal view of the chest was performed.    COMPARISON:  Chest radiograph 02/07/2015    FINDINGS:  Monitoring leads overlie the chest.  No detrimental change.  Cardiomediastinal silhouette is stable without evidence of failure.  The lungs are symmetrically well expanded without large consolidation or new focal opacity.  No large pleural effusion or pneumothorax.  Pulmonary vasculature and hilar regions are within normal limits.  No acute osseous process seen.  PA and lateral views can be obtained.                                 Medical Decision Making:   History:   Old Medical Records: I decided to obtain old medical records.  Initial Assessment:   Sent in by PMD for suspected neck abscess versus mass. Clinically appearing to be communicating with throat. Hypotension, Infection vs. Dehydration? Will undertake septic workup. Start antibiotics and fluids. Consult with ENT.  Clinical Tests:   Lab Tests: Ordered and Reviewed  Radiological Study: Ordered and Reviewed  Medical Tests: Ordered and Reviewed  ED  Management:  Update:  BP improved with IV fluids, she continues to have good mentation.  Seen by ENT doctors, they believe this is mass rather than abscess.  They do not believe this infection.   Creatinine is noted to be elevated at 6, baseline less than 1.  She is in acute renal failure.   This is most likely secondary to dehydration given throat pain. There is no abdominal fullness, will order bladder scan to assure no postobstructive renal failure.   Patient initially resistant to inpatient stay, however she was convinced to stay 1 night.   Discussed with her power of , she still has capacity to make some decisions, how return any make a decision she is not able to.   Her BP is improving with fluids, she is mentating well, safe for floor admission at this time    Critical Care Time:     The high probability of sudden, clinically significant deterioration in the patient's condition required the highest level of my preparedness to intervene urgently.    Services included the following: chart data review, reviewing nursing notes and/or old charts, documentation time, consultant collaboration regarding findings and treatment options, medication orders and management, direct patient care, vital sign assessments and ordering, interpreting and reviewing diagnostic studies/lab tests.     I spent 40 minutes on total Critical Care time, which includes only time during which I was engaged in work directly related to the patient's care, as described above, whether at the bedside or elsewhere in the Emergency Department.  It did not include time spent on separately billable procedures nor did it include the time spent by residents, students, nurses or physician assistants on this patient's care.    Critical Care was needed secondary to the following conditions:  Hypotension, chronic throat mass, acute renal failure.              Scribe Attestation:   Scribe #1: I performed the above scribed service and the  documentation accurately describes the services I performed. I attest to the accuracy of the note.               Clinical Impression:       ICD-10-CM ICD-9-CM   1. Sepsis A41.9 038.9     995.91                                Matthew Borjas MD  04/13/19 0419

## 2019-04-13 NOTE — ASSESSMENT & PLAN NOTE
- Most likely due to hypovolemia given decrease oral intake and reported diarrhea.    Plan:  1- F/U clx.  2- Continue Broad spectrum antibiotics.  3- Continue IVF.

## 2019-04-13 NOTE — ASSESSMENT & PLAN NOTE
- Discussed with the patient, she wishes not to be resituated and when it's her time just let her Die.  - Given the questionable dementia in her chart, I personally Called her  Ms. Garcia who confirmed that this was patient wishes for a very long time.

## 2019-04-13 NOTE — NURSING
"New admit to [facility]MT[facility].  VSS  .  Ambulating to (  Looking for food.)  Told patient she is NPO now except for ice chips/sips of water with her meds.  Patient verbalized understanding and put her "bag of food away."  She said she did not eat all day, but she  will not eat until told to do so.  Visi monitor applied .  Telemetry monitor in place.    "

## 2019-04-13 NOTE — SUBJECTIVE & OBJECTIVE
Past Medical History:   Diagnosis Date    Anxiety     Dementia     Depression      of psychiatric care     Hypertension     Psychiatric problem     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    Seizures     Therapy     Transient loss of consciousness 9/28/2012    presumed seizures       Past Surgical History:   Procedure Laterality Date    BLOCK STELLATE GANGLION Right 12/22/2017    Performed by [facility] at [facility]    BLOCK-STELLATE GANGLION Right 8/1/2017    Performed by [facility] at [facility]       Review of patient's allergies indicates:  No Known Allergies    Current Facility-Administered Medications on File Prior to Encounter   Medication    [COMPLETED] iohexol (OMNIPAQUE 350) injection 100 mL     Current Outpatient Medications on File Prior to Encounter   Medication Sig    diazePAM (VALIUM) 2 MG tablet TAKE 1 TABLET BY MOUTH 3 TIMES A DAY AS NEEDED FOR ANXIETY    gabapentin (NEURONTIN) 300 MG capsule TAKE 1 CAPSULE (300 MG TOTAL) BY MOUTH EVERY EVENING.    levETIRAcetam (KEPPRA) 1000 MG tablet TAKE 1 TABLET BY MOUTH TWICE A DAY    meclizine (ANTIVERT) 25 mg tablet TAKE 1 TABLET (25 MG TOTAL) BY MOUTH 3 (THREE) TIMES DAILY AS NEEDED FOR DIZZINESS.    b complex vitamins tablet Take 1 tablet by mouth once daily.    cyanocobalamin (VITAMIN B-12) 1000 MCG tablet Take 100 mcg by mouth once daily.      folic acid (FOLVITE) 800 MCG Tab Take 800 mcg by mouth once daily.      ketorolac (TORADOL) 10 mg tablet Take 1 tablet (10 mg total) by mouth every 6 (six) hours. As needed to control pain.  Best if taken with food.    losartan (COZAAR) 25 MG tablet Take 1 tablet (25 mg total) by mouth once daily.    melatonin 5 mg Tab Take 1 tablet (5 mg total) by mouth nightly as needed.    neomycin-polymyxin-hydrocortisone (CORTISPORIN) otic solution Three drops to affected ear 4 times daily    potassium chloride SA (K-DUR,KLOR-CON) 20 MEQ tablet Take 1 tablet (20 mEq total) by  mouth daily as needed (when taking furosemide for swelling).     Family History     None        Tobacco Use    Smoking status: Never Smoker    Smokeless tobacco: Never Used   Substance and Sexual Activity    Alcohol use: Yes     Alcohol/week: 1.0 oz     Types: 2 Standard drinks or equivalent per week    Drug use: No    Sexual activity: Not Currently     Review of Systems   Constitutional: Negative for activity change, appetite change, chills and fever.   HENT: Positive for congestion, ear pain, facial swelling and trouble swallowing.    Eyes: Negative for pain and itching.   Respiratory: Negative for cough, chest tightness and shortness of breath.    Cardiovascular: Negative for chest pain, palpitations and leg swelling.   Gastrointestinal: Positive for diarrhea. Negative for abdominal pain and nausea.   Endocrine: Negative for polyphagia and polyuria.   Genitourinary: Negative for dysuria and frequency.   Musculoskeletal: Negative for back pain.   Skin: Negative for rash.   Neurological: Negative for numbness and headaches.   Psychiatric/Behavioral: Negative for agitation and behavioral problems.     Objective:     Vital Signs (Most Recent):  Temp: 98.1 °F (36.7 °C) (04/12/19 1832)  Pulse: 85 (04/12/19 2351)  Resp: 16 (04/12/19 2309)  BP: 115/73 (04/12/19 2351)  SpO2: 100 % (04/12/19 2351) Vital Signs (24h Range):  Temp:  [98.1 °F (36.7 °C)] 98.1 °F (36.7 °C)  Pulse:  [] 85  Resp:  [16-19] 16  SpO2:  [96 %-100 %] 100 %  BP: ()/(51-73) 115/73     Weight: 54.4 kg (120 lb)  Body mass index is 19.97 kg/m².    Physical Exam   Constitutional: She is oriented to person, place, and time. She appears well-developed and well-nourished.   HENT:   Head: Normocephalic and atraumatic.   Neck: Normal range of motion. Neck supple.   Left side of neck and face swelling   Cardiovascular: Normal rate, regular rhythm and normal heart sounds.   Pulmonary/Chest: Effort normal and breath sounds normal.   Abdominal: Soft.  Bowel sounds are normal.   Musculoskeletal: She exhibits no edema.   Neurological: She is alert and oriented to person, place, and time.   Skin: Skin is warm and dry.   Psychiatric: She has a normal mood and affect.           Significant Labs:   Blood Culture: No results for input(s): LABBLOO in the last 48 hours.  BMP:   Recent Labs   Lab 04/12/19  2339   GLU 81      K 3.9      CO2 20*   BUN 47*   CREATININE 5.3*   CALCIUM 7.7*   MG 1.0*     CBC:   Recent Labs   Lab 04/12/19 1926   WBC 6.57   HGB 11.0*   HCT 34.2*        CMP:   Recent Labs   Lab 04/12/19 1926 04/12/19 2339    139   K 3.8 3.9   CL 95 102   CO2 20* 20*   GLU 85 81   BUN 49* 47*   CREATININE 6.1* 5.3*   CALCIUM 8.4* 7.7*   PROT 7.3 6.4   ALBUMIN 3.7 3.2*   BILITOT 0.3 0.3   ALKPHOS 67 64   AST 13 12   ALT 7* <5*   ANIONGAP 23* 17*   EGFRNONAA 6.8* 8.0*     Lactic Acid:   Recent Labs   Lab 04/12/19 1926 04/12/19 2339   LACTATE 2.3* 0.9     Lipase: No results for input(s): LIPASE in the last 48 hours.  POCT Glucose: No results for input(s): POCTGLUCOSE in the last 48 hours.  Troponin: No results for input(s): TROPONINI in the last 48 hours.  TSH: No results for input(s): TSH in the last 4320 hours.  Urine Studies:   Recent Labs   Lab 04/12/19  2211   COLORU Yellow   APPEARANCEUA Cloudy*   PHUR 5.0   SPECGRAV >=1.030*   PROTEINUA Negative   GLUCUA Negative   KETONESU Trace*   BILIRUBINUA Negative   OCCULTUA 1+*   NITRITE Negative   LEUKOCYTESUR 3+*   RBCUA 3   WBCUA 96*   BACTERIA Moderate*   SQUAMEPITHEL 4   HYALINECASTS 107*     All pertinent labs within the past 24 hours have been reviewed.    Significant Imaging: I have reviewed and interpreted all pertinent imaging results/findings within the past 24 hours.

## 2019-04-13 NOTE — CONSULTS
"Otolaryngology - Head and Neck Surgery  Consultation Report    Consultation From:  ER; Matthew Borjas MD    Chief Complaint: Sore throat    History of Present Illness: 63 y.o. year old female with Parkinson's and dementia presents with sore throat and left sided ear pain x 1 month. Patient reports this has been progressive and makes it difficult for her to swallow solid foods, but able to take in liquids. She has had weight loss of unknown amount as well as left sided ear pain for 1 month. Denies smoking history save for marijuana "the good stuff". Denies SOB or voice changes. No new lumps or bumps in neck.    Review of Systems reviewed including    CONSTITUTIONAL: no fevers, chills, + weight loss, night sweats  EYES: no diplopia, no blurry vision  ENT: as above   NEURO: tremors, cogwheel rigidity  CV: no CP, no palpitations  PULM: no cough, no SOB, no wheezing   GI: no abd pain, no constipation/diarrhea  : no dysuria, no hematuria  MSK: no bone/joint pain  HEM: no bruising or bleeding       Past Medical History: Patient has a past medical history of Anxiety, Dementia, Depression, psychiatric care, Hypertension, Psychiatric problem, Secondary parkinsonism (9/28/2012), Seizures, Therapy, and Transient loss of consciousness (9/28/2012).    Past Surgical History: Patient has no past surgical history on file.    Social History: Patient reports that she has never smoked. She has never used smokeless tobacco. She reports that she drinks about 1.0 oz of alcohol per week. She reports that she does not use drugs.    Family History: family history is not on file.    Medications:   Current Facility-Administered Medications:     acetaminophen tablet 650 mg, 650 mg, Oral, ED 1 Time, Matthew Borjas MD    ampicillin-sulbactam 3 g in sodium chloride 0.9 % 100 mL IVPB (ready to mix system), 3 g, Intravenous, ED 1 Time, Matthew Borjas MD, Last Rate: 100 mL/hr at 04/12/19 2018, 3 g at 04/12/19 2018    Current Outpatient " Medications:     diazePAM (VALIUM) 2 MG tablet, TAKE 1 TABLET BY MOUTH 3 TIMES A DAY AS NEEDED FOR ANXIETY, Disp: 90 tablet, Rfl: 2    gabapentin (NEURONTIN) 300 MG capsule, TAKE 1 CAPSULE (300 MG TOTAL) BY MOUTH EVERY EVENING., Disp: 30 capsule, Rfl: 10    levETIRAcetam (KEPPRA) 1000 MG tablet, TAKE 1 TABLET BY MOUTH TWICE A DAY, Disp: 60 tablet, Rfl: 6    meclizine (ANTIVERT) 25 mg tablet, TAKE 1 TABLET (25 MG TOTAL) BY MOUTH 3 (THREE) TIMES DAILY AS NEEDED FOR DIZZINESS., Disp: 30 tablet, Rfl: 0    b complex vitamins tablet, Take 1 tablet by mouth once daily., Disp: , Rfl:     cyanocobalamin (VITAMIN B-12) 1000 MCG tablet, Take 100 mcg by mouth once daily.  , Disp: , Rfl:     folic acid (FOLVITE) 800 MCG Tab, Take 800 mcg by mouth once daily.  , Disp: , Rfl:     ketorolac (TORADOL) 10 mg tablet, Take 1 tablet (10 mg total) by mouth every 6 (six) hours. As needed to control pain.  Best if taken with food., Disp: 20 tablet, Rfl: 0    losartan (COZAAR) 25 MG tablet, Take 1 tablet (25 mg total) by mouth once daily., Disp: 90 tablet, Rfl: 3    melatonin 5 mg Tab, Take 1 tablet (5 mg total) by mouth nightly as needed., Disp: , Rfl:     neomycin-polymyxin-hydrocortisone (CORTISPORIN) otic solution, Three drops to affected ear 4 times daily, Disp: 1 Bottle, Rfl: 3    potassium chloride SA (K-DUR,KLOR-CON) 20 MEQ tablet, Take 1 tablet (20 mEq total) by mouth daily as needed (when taking furosemide for swelling)., Disp: 30 tablet, Rfl: 1   acetaminophen  650 mg Oral ED 1 Time    ampicillin-sulbactim (UNASYN) IVPB  3 g Intravenous ED 1 Time        Allergies: Patient has No Known Allergies.    Physical Exam:  Temp:  [98.1 °F (36.7 °C)] 98.1 °F (36.7 °C)  Pulse:  [] 92  Resp:  [17-19] 19  SpO2:  [96 %-100 %] 98 %  BP: (81-98)/(53-61) 97/61    Intake/Output  No intake or output data in the 24 hours ending 04/12/19 2102      General: NAD; Thin  Neuro: AAOx3. CN II-XII intact.  Respiratory: No labored  breathing, no stridor  Head/Face: Normal inspection  Salivary glands WNL.  Eyes: EOMI; PERRLA   Right Ear: Auricle WNL. EAC WNL. TM normal.      Left Ear: Auricle WNL. EAC WNL. TM normal.  Nose: normal ext appearance and palpation; Negative sinus pressure/tenderness;. Normal septum, inferior turbinates, mucosa.   OC: Lips and gingiva wnl.  Good dentition. FOM Soft.  Anterior Tongue nml size and mobility; Hard Palate wnl  OP: BOT WNL. Soft palate with ~ 2cm necrotic appearing lesion with central absence of tissue extending posterioorly into parapharyngeal space. No evidence of purulence or uvular deviation.  Tonsils 2+ bilaterally. Posterior oropharynx patent, wnl  Neck/Lymphatic: No LAD.   No thyromegaly. Full ROM.    Flexible Fiberoptic Laryngoscopy   Verbal consent obtained. Anesthesia with 4% lidocaine instilled to left nare.  Overall findings:  Nasal Cavity- normal   Nasopharynx   Adenoid tissue - normal    eustachian tube orifices - normal   Oropharynx   Posterior pharyngeal wall - normal   Base of tongue - normal    Valleculae - normal  Hypopharynx   Pyriform sinuses - normal    Post-cricoid normal  Supraglottis   Epiglottis - normal    AE folds- normal   Arytenoids - normal   Interarytenoid space - normal   False vocal cords - normal   Glottis   True vocal cords - normal  Subglottis: normal    LABORATORY  CBC  Recent Labs   Lab 04/12/19 1926   WBC 6.57   HGB 11.0*   HCT 34.2*   *        BMP  Recent Labs   Lab 04/12/19 1926   GLU 85      K 3.8   CL 95   CO2 20*   BUN 49*   CREATININE 6.1*   CALCIUM 8.4*     COAGS  No results for input(s): PROTIME, INR, PTT in the last 72 hours.    IMAGING  Imaging Results          X-Ray Chest AP Portable (Final result)  Result time 04/12/19 19:57:16    Final result by Javid Garcia MD (04/12/19 19:57:16)                 Impression:      No detrimental change or radiographic acute intrathoracic process seen.  Specifically, no focal  consolidation.      Electronically signed by: Javid Garcia MD  Date:    04/12/2019  Time:    19:57             Narrative:    EXAMINATION:  XR CHEST AP PORTABLE    CLINICAL HISTORY:  Sepsis;    TECHNIQUE:  Single frontal view of the chest was performed.    COMPARISON:  Chest radiograph 02/07/2015    FINDINGS:  Monitoring leads overlie the chest.  No detrimental change.  Cardiomediastinal silhouette is stable without evidence of failure.  The lungs are symmetrically well expanded without large consolidation or new focal opacity.  No large pleural effusion or pneumothorax.  Pulmonary vasculature and hilar regions are within normal limits.  No acute osseous process seen.  PA and lateral views can be obtained.                                 Assessment: 63 y.o. 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 Associated weight loss, left sided otalgia, and trismus with symptoms progressive over 1 month period. Denies smoking history save for marijuana. Of note, MRI from  03/20 demonstrates same mass as noted on CT imaging.    Plan:   - No acute ENT intervention at this time  - Will schedule close follow-up early next week to arrange for [facility] with [facility]  - Tolerating liquids and some solids currently  - Pain control per ED   - Dispo per ED once BP managed  - ENT available for further questions or concerns should they arise  - Discussed with staff, Dr. Ryley Louis MD  Otolaryngology PGY-III

## 2019-04-13 NOTE — ASSESSMENT & PLAN NOTE
- was recently changed from lisnopril to losartan 25 mg daily.  - Hold BP medication in setting of hypotension.

## 2019-04-13 NOTE — PLAN OF CARE
Problem: Adult Inpatient Plan of Care  Goal: Absence of Hospital-Acquired Illness or Injury  Outcome: Ongoing (interventions implemented as appropriate)  New admit to IMTA.   Pt able to express needs.  No c/o pain. Bladder scan 172cc at 1:55am.   Safety maintained.  Bed in low position, call light in reach.    Will continue to monitor

## 2019-04-13 NOTE — H&P
[facility] Hospital Medicine  History & Physical    Patient Name: Breonna Silva  MRN: [id number]  Admission Date: 2019  Attending Physician: Hebert Dorado MD   Primary Care Provider: Jarrell Del Valle MD    [facility] Medicine Team: [facility] 1 Nancy Duncan MD     Patient information was obtained from patient and ER records.     Subjective:     Principal Problem:Acute renal failure    Chief Complaint:   Chief Complaint   Patient presents with    Dysphagia     pt from [facility]. left neck mass with trouble swallowing.     Hypotension        HPI: This is a 63 y.o. female with  hx of seizures, HTN, and secondary parkinsonism, dementia, who presents to the ED with a complaint of strong neck pain with associated symptoms of left ear pain, active cough, and trouble swallowing, for the last month.     Patient also reports having lost weight and not eating or drinking as much as she usually does. Patient denies fever, belly pain, chest pain, abdominal pain, shortness of breath and pain while urinating. Denies any use of NSAIDs.    Social: Lives alone per pt and , she is independent in daily living. Animal exposure- Cat (pet). All her family member including her  has passed away, She is her power of  but in case pt lost her Capacity her Power  is Ms. mary Garcia [phone].  FHX: Mother  from breast Ca at age 32.    Screening test: Last coloscopy  (NL per pt) - never had mammogram done.    In ED, She was hypotensive, given 30 cc/kg NS + Unycn x1, ENT evaluated the pt which they did not feel the mass is an abscess, recommended outpt follow up. Pt was admitted to hospital medicine for acute renal failure.         Past Medical History:   Diagnosis Date    Anxiety     Dementia     Depression     Hx of psychiatric care     Hypertension     Psychiatric problem     Secondary parkinsonism 2012    Seizures      Therapy     Transient loss of consciousness 9/28/2012    presumed seizures       Past Surgical History:   Procedure Laterality Date    BLOCK STELLATE GANGLION Right 12/22/2017    Performed by [facility] at [facility]    BLOCK-STELLATE GANGLION Right 8/1/2017    Performed by [facility]kins [facility] at [facility]       Review of patient's allergies indicates:  No Known Allergies    Current Facility-Administered Medications on File Prior to Encounter   Medication    [COMPLETED] iohexol (OMNIPAQUE 350) injection 100 mL     Current Outpatient Medications on File Prior to Encounter   Medication Sig    diazePAM (VALIUM) 2 MG tablet TAKE 1 TABLET BY MOUTH 3 TIMES A DAY AS NEEDED FOR ANXIETY    gabapentin (NEURONTIN) 300 MG capsule TAKE 1 CAPSULE (300 MG TOTAL) BY MOUTH EVERY EVENING.    levETIRAcetam (KEPPRA) 1000 MG tablet TAKE 1 TABLET BY MOUTH TWICE A DAY    meclizine (ANTIVERT) 25 mg tablet TAKE 1 TABLET (25 MG TOTAL) BY MOUTH 3 (THREE) TIMES DAILY AS NEEDED FOR DIZZINESS.    b complex vitamins tablet Take 1 tablet by mouth once daily.    cyanocobalamin (VITAMIN B-12) 1000 MCG tablet Take 100 mcg by mouth once daily.      folic acid (FOLVITE) 800 MCG Tab Take 800 mcg by mouth once daily.      ketorolac (TORADOL) 10 mg tablet Take 1 tablet (10 mg total) by mouth every 6 (six) hours. As needed to control pain.  Best if taken with food.    losartan (COZAAR) 25 MG tablet Take 1 tablet (25 mg total) by mouth once daily.    melatonin 5 mg Tab Take 1 tablet (5 mg total) by mouth nightly as needed.    neomycin-polymyxin-hydrocortisone (CORTISPORIN) otic solution Three drops to affected ear 4 times daily    potassium chloride SA (K-DUR,KLOR-CON) 20 MEQ tablet Take 1 tablet (20 mEq total) by mouth daily as needed (when taking furosemide for swelling).     Family History     None        Tobacco Use    Smoking status: Never Smoker    Smokeless tobacco: Never Used   Substance and Sexual Activity     Alcohol use: Yes     Alcohol/week: 1.0 oz     Types: 2 Standard drinks or equivalent per week    Drug use: No    Sexual activity: Not Currently     Review of Systems   Constitutional: Negative for activity change, appetite change, chills and fever.   HENT: Positive for congestion, ear pain, facial swelling and trouble swallowing.    Eyes: Negative for pain and itching.   Respiratory: Negative for cough, chest tightness and shortness of breath.    Cardiovascular: Negative for chest pain, palpitations and leg swelling.   Gastrointestinal: Positive for diarrhea. Negative for abdominal pain and nausea.   Endocrine: Negative for polyphagia and polyuria.   Genitourinary: Negative for dysuria and frequency.   Musculoskeletal: Negative for back pain.   Skin: Negative for rash.   Neurological: Negative for numbness and headaches.   Psychiatric/Behavioral: Negative for agitation and behavioral problems.     Objective:     Vital Signs (Most Recent):  Temp: 98.1 °F (36.7 °C) (04/12/19 1832)  Pulse: 85 (04/12/19 2351)  Resp: 16 (04/12/19 2309)  BP: 115/73 (04/12/19 2351)  SpO2: 100 % (04/12/19 2351) Vital Signs (24h Range):  Temp:  [98.1 °F (36.7 °C)] 98.1 °F (36.7 °C)  Pulse:  [] 85  Resp:  [16-19] 16  SpO2:  [96 %-100 %] 100 %  BP: ()/(51-73) 115/73     Weight: 54.4 kg (120 lb)  Body mass index is 19.97 kg/m².    Physical Exam   Constitutional: She is oriented to person, place, and time. She appears well-developed and well-nourished.   HENT:   Head: Normocephalic and atraumatic.   Neck: Normal range of motion. Neck supple.   Left side of neck and face swelling   Cardiovascular: Normal rate, regular rhythm and normal heart sounds.   Pulmonary/Chest: Effort normal and breath sounds normal.   Abdominal: Soft. Bowel sounds are normal.   Musculoskeletal: She exhibits no edema.   Neurological: She is alert and oriented to person, place, and time.   Skin: Skin is warm and dry.   Psychiatric: She has a normal mood and  affect.           Significant Labs:   Blood Culture: No results for input(s): LABBLOO in the last 48 hours.  BMP:   Recent Labs   Lab 04/12/19  2339   GLU 81      K 3.9      CO2 20*   BUN 47*   CREATININE 5.3*   CALCIUM 7.7*   MG 1.0*     CBC:   Recent Labs   Lab 04/12/19 1926   WBC 6.57   HGB 11.0*   HCT 34.2*        CMP:   Recent Labs   Lab 04/12/19 1926 04/12/19 2339    139   K 3.8 3.9   CL 95 102   CO2 20* 20*   GLU 85 81   BUN 49* 47*   CREATININE 6.1* 5.3*   CALCIUM 8.4* 7.7*   PROT 7.3 6.4   ALBUMIN 3.7 3.2*   BILITOT 0.3 0.3   ALKPHOS 67 64   AST 13 12   ALT 7* <5*   ANIONGAP 23* 17*   EGFRNONAA 6.8* 8.0*     Lactic Acid:   Recent Labs   Lab 04/12/19 1926 04/12/19 2339   LACTATE 2.3* 0.9     Lipase: No results for input(s): LIPASE in the last 48 hours.  POCT Glucose: No results for input(s): POCTGLUCOSE in the last 48 hours.  Troponin: No results for input(s): TROPONINI in the last 48 hours.  TSH: No results for input(s): TSH in the last 4320 hours.  Urine Studies:   Recent Labs   Lab 04/12/19  2211   COLORU Yellow   APPEARANCEUA Cloudy*   PHUR 5.0   SPECGRAV >=1.030*   PROTEINUA Negative   GLUCUA Negative   KETONESU Trace*   BILIRUBINUA Negative   OCCULTUA 1+*   NITRITE Negative   LEUKOCYTESUR 3+*   RBCUA 3   WBCUA 96*   BACTERIA Moderate*   SQUAMEPITHEL 4   HYALINECASTS 107*     All pertinent labs within the past 24 hours have been reviewed.    Significant Imaging: I have reviewed and interpreted all pertinent imaging results/findings within the past 24 hours.    Assessment/Plan:     * Acute renal failure  - Pt with no hx of kidney disease, presenting with 1 month hx of decrease oral intake and weight loss.    - Exam: SBP ~80's / Hr 100.  - Labs: WBC WNL - BUN/Crt 49/6.1 - Lactate 2.3.  - Images: CXR-  Negative / U/S kidney- unremarkable.    ** Most likely Pre-renal due to hypovolemia Vs ATN from hypotension.     Plan:  1- Follow up urine lytes.  2- S/P 30cc /kg + 500cc IVF,  follow up Crt post IVF.  3- Strict In/out-  4- Avoid nephrotoxic medication.  5- Nephrology consulted appreciate their help.      Odynophagia  - See Neck mass.      Neck mass  - 1 month hx of ear pain, difficulty swallowing and left side swelling. Animal exposure - Cat (pet)  - FHx of breast Ca.    MRI- brain  03/25- questioned edema and enhancement in the left infratemporal fossa/parapharyngeal space partially visualized cannot exclude underlying lesion  CT-Neck 04/12- there is an abscess in the left pharyngeal mucosal space, with some mild mass effect on the airway.  Although imaged in a different modality today, extent appears fairly similar to prior study.    - ENT evaluated the patient in ED - No acute ENT intervention at this time/ schedule close follow-up early next week to arrange for DL with biopsy - [facility]    DDx: Infectious causes vs malignancy     Plan:  1- Follow up HIV/ RPR/ Q-TB/Toxo/CMV/EBV/ Bartonella.  2- Follow up blood and urine clx.  3- NPO for now - Speech team consulted appreciate their help.  4- Continue broad spectrum Abx with Vancomycin and Zosyn. Can deescalate in AM. Please follow up vancomycin random Q12pm.  5- Tissue biopsy for definitive diagnosis.            Hypotension, unspecified  - Most likely due to hypovolemia given decrease oral intake and reported diarrhea.    Plan:  1- F/U clx.  2- Continue Broad spectrum antibiotics.  3- Continue IVF.      Seizure  - Continue Keppra 1 g BID.      Secondary parkinsonism  - Pt of Dr. Saul.- Failed multiple meds.      Dementia  - 2011 Neuropsychological testing is consistent with moderate dementia      Anemia  - Hb baseline ~11.  - Iron study + folate + vit b12 level ordered.      Essential hypertension, benign  - was recently changed from lisnopril to losartan 25 mg daily.  - Hold BP medication in setting of hypotension.      DNR (do not resuscitate)  - Discussed with the patient, she wishes not to be resituated and when  it's her time just let her Die.  - Given the questionable dementia in her chart, I personally Called her  Ms. Garcia who confirmed that this was patient wishes for a very long time.      Alcohol dependence with alcohol-induced persisting dementia  - State she drinks every day 1-2 glasses but has not been drinking for the last month due to her odynophagia.        VTE Risk Mitigation (From admission, onward)        Ordered     Place sequential compression device  Until discontinued      04/12/19 2254     IP VTE LOW RISK PATIENT  Once      04/12/19 2254             Nancy Duncan MD  Department of Hospital Medicine   [facility]

## 2019-04-14 LAB
ALBUMIN SERPL BCP-MCNC: 3.1 G/DL (ref 3.5–5.2)
ALP SERPL-CCNC: 60 U/L (ref 55–135)
ALT SERPL W/O P-5'-P-CCNC: 7 U/L (ref 10–44)
ANION GAP SERPL CALC-SCNC: 10 MMOL/L (ref 8–16)
AST SERPL-CCNC: 15 U/L (ref 10–40)
BASOPHILS # BLD AUTO: 0.02 K/UL (ref 0–0.2)
BASOPHILS NFR BLD: 0.6 % (ref 0–1.9)
BILIRUB SERPL-MCNC: 0.2 MG/DL (ref 0.1–1)
BUN SERPL-MCNC: 42 MG/DL (ref 8–23)
CALCIUM SERPL-MCNC: 7.9 MG/DL (ref 8.7–10.5)
CHLORIDE SERPL-SCNC: 109 MMOL/L (ref 95–110)
CHLORIDE UR-SCNC: 21 MMOL/L (ref 25–200)
CO2 SERPL-SCNC: 21 MMOL/L (ref 23–29)
CREAT SERPL-MCNC: 1.1 MG/DL (ref 0.5–1.4)
CREAT UR-MCNC: 161 MG/DL (ref 15–325)
DIFFERENTIAL METHOD: ABNORMAL
EOSINOPHIL # BLD AUTO: 0.2 K/UL (ref 0–0.5)
EOSINOPHIL NFR BLD: 4.2 % (ref 0–8)
ERYTHROCYTE [DISTWIDTH] IN BLOOD BY AUTOMATED COUNT: 12.9 % (ref 11.5–14.5)
EST. GFR  (AFRICAN AMERICAN): >60 ML/MIN/1.73 M^2
EST. GFR  (NON AFRICAN AMERICAN): 53.6 ML/MIN/1.73 M^2
GLUCOSE SERPL-MCNC: 93 MG/DL (ref 70–110)
HCT VFR BLD AUTO: 32.6 % (ref 37–48.5)
HGB BLD-MCNC: 10.3 G/DL (ref 12–16)
IMM GRANULOCYTES # BLD AUTO: 0.01 K/UL (ref 0–0.04)
IMM GRANULOCYTES NFR BLD AUTO: 0.3 % (ref 0–0.5)
LYMPHOCYTES # BLD AUTO: 0.9 K/UL (ref 1–4.8)
LYMPHOCYTES NFR BLD: 25.7 % (ref 18–48)
MAGNESIUM SERPL-MCNC: 1 MG/DL (ref 1.6–2.6)
MCH RBC QN AUTO: 34 PG (ref 27–31)
MCHC RBC AUTO-ENTMCNC: 31.6 G/DL (ref 32–36)
MCV RBC AUTO: 108 FL (ref 82–98)
MONOCYTES # BLD AUTO: 0.3 K/UL (ref 0.3–1)
MONOCYTES NFR BLD: 7.1 % (ref 4–15)
NEUTROPHILS # BLD AUTO: 2.2 K/UL (ref 1.8–7.7)
NEUTROPHILS NFR BLD: 62.1 % (ref 38–73)
NRBC BLD-RTO: 0 /100 WBC
PHOSPHATE SERPL-MCNC: 2.9 MG/DL (ref 2.7–4.5)
PLATELET # BLD AUTO: 207 K/UL (ref 150–350)
PMV BLD AUTO: 11 FL (ref 9.2–12.9)
POCT GLUCOSE: 117 MG/DL (ref 70–110)
POCT GLUCOSE: 89 MG/DL (ref 70–110)
POCT GLUCOSE: 99 MG/DL (ref 70–110)
POTASSIUM SERPL-SCNC: 4.4 MMOL/L (ref 3.5–5.1)
POTASSIUM UR-SCNC: 24 MMOL/L (ref 15–95)
PROT SERPL-MCNC: 6.2 G/DL (ref 6–8.4)
RBC # BLD AUTO: 3.03 M/UL (ref 4–5.4)
SODIUM SERPL-SCNC: 140 MMOL/L (ref 136–145)
SODIUM UR-SCNC: 46 MMOL/L (ref 20–250)
UUN UR-MCNC: 403 MG/DL (ref 140–1050)
WBC # BLD AUTO: 3.54 K/UL (ref 3.9–12.7)

## 2019-04-14 PROCEDURE — 99232 PR SUBSEQUENT HOSPITAL CARE,LEVL II: ICD-10-PCS | Mod: HCNC,GC,, | Performed by: INTERNAL MEDICINE

## 2019-04-14 PROCEDURE — 84100 ASSAY OF PHOSPHORUS: CPT | Mod: HCNC

## 2019-04-14 PROCEDURE — 63600175 PHARM REV CODE 636 W HCPCS: Mod: HCNC | Performed by: STUDENT IN AN ORGANIZED HEALTH CARE EDUCATION/TRAINING PROGRAM

## 2019-04-14 PROCEDURE — 99232 SBSQ HOSP IP/OBS MODERATE 35: CPT | Mod: HCNC,GC,, | Performed by: INTERNAL MEDICINE

## 2019-04-14 PROCEDURE — 80053 COMPREHEN METABOLIC PANEL: CPT | Mod: HCNC

## 2019-04-14 PROCEDURE — 11000001 HC ACUTE MED/SURG PRIVATE ROOM: Mod: HCNC

## 2019-04-14 PROCEDURE — 85025 COMPLETE CBC W/AUTO DIFF WBC: CPT | Mod: HCNC

## 2019-04-14 PROCEDURE — 25000003 PHARM REV CODE 250: Mod: HCNC | Performed by: STUDENT IN AN ORGANIZED HEALTH CARE EDUCATION/TRAINING PROGRAM

## 2019-04-14 PROCEDURE — 83735 ASSAY OF MAGNESIUM: CPT | Mod: HCNC

## 2019-04-14 PROCEDURE — 36415 COLL VENOUS BLD VENIPUNCTURE: CPT | Mod: HCNC

## 2019-04-14 RX ADMIN — DIAZEPAM 2 MG: 2 TABLET ORAL at 06:04

## 2019-04-14 RX ADMIN — LEVETIRACETAM 500 MG: 500 TABLET, FILM COATED ORAL at 09:04

## 2019-04-14 RX ADMIN — ENOXAPARIN SODIUM 30 MG: 100 INJECTION SUBCUTANEOUS at 05:04

## 2019-04-14 RX ADMIN — ACETAMINOPHEN 650 MG: 325 TABLET ORAL at 05:04

## 2019-04-14 RX ADMIN — RAMELTEON 8 MG: 8 TABLET, FILM COATED ORAL at 09:04

## 2019-04-15 VITALS
WEIGHT: 120 LBS | HEIGHT: 66 IN | BODY MASS INDEX: 19.29 KG/M2 | DIASTOLIC BLOOD PRESSURE: 90 MMHG | TEMPERATURE: 98 F | RESPIRATION RATE: 18 BRPM | SYSTOLIC BLOOD PRESSURE: 170 MMHG | OXYGEN SATURATION: 100 % | HEART RATE: 90 BPM

## 2019-04-15 PROBLEM — I95.9 HYPOTENSION, UNSPECIFIED: Status: RESOLVED | Noted: 2019-04-13 | Resolved: 2019-04-15

## 2019-04-15 PROBLEM — N17.9 ACUTE RENAL FAILURE: Status: RESOLVED | Noted: 2019-04-12 | Resolved: 2019-04-15

## 2019-04-15 LAB
ALBUMIN SERPL BCP-MCNC: 3.3 G/DL (ref 3.5–5.2)
ALP SERPL-CCNC: 52 U/L (ref 55–135)
ALT SERPL W/O P-5'-P-CCNC: 8 U/L (ref 10–44)
ANION GAP SERPL CALC-SCNC: 11 MMOL/L (ref 8–16)
AST SERPL-CCNC: 16 U/L (ref 10–40)
BACTERIA UR CULT: NORMAL
BASOPHILS # BLD AUTO: 0.02 K/UL (ref 0–0.2)
BASOPHILS NFR BLD: 0.5 % (ref 0–1.9)
BILIRUB SERPL-MCNC: 0.3 MG/DL (ref 0.1–1)
BUN SERPL-MCNC: 25 MG/DL (ref 8–23)
CALCIUM SERPL-MCNC: 8.6 MG/DL (ref 8.7–10.5)
CHLORIDE SERPL-SCNC: 110 MMOL/L (ref 95–110)
CMV IGM SERPL IA-ACNC: <8 U/ML
CO2 SERPL-SCNC: 23 MMOL/L (ref 23–29)
CREAT SERPL-MCNC: 0.7 MG/DL (ref 0.5–1.4)
DIFFERENTIAL METHOD: ABNORMAL
EBV EA IGG SER-ACNC: 5.1 U/ML
EBV NA IGG SER-ACNC: >600 U/ML
EBV VCA IGG SER-ACNC: >750 U/ML
EBV VCA IGM SER-ACNC: <10 U/ML
EOSINOPHIL # BLD AUTO: 0.1 K/UL (ref 0–0.5)
EOSINOPHIL NFR BLD: 3.4 % (ref 0–8)
ERYTHROCYTE [DISTWIDTH] IN BLOOD BY AUTOMATED COUNT: 12.8 % (ref 11.5–14.5)
EST. GFR  (AFRICAN AMERICAN): >60 ML/MIN/1.73 M^2
EST. GFR  (NON AFRICAN AMERICAN): >60 ML/MIN/1.73 M^2
GLUCOSE SERPL-MCNC: 93 MG/DL (ref 70–110)
HCT VFR BLD AUTO: 32.7 % (ref 37–48.5)
HGB BLD-MCNC: 10.4 G/DL (ref 12–16)
IMM GRANULOCYTES # BLD AUTO: 0.01 K/UL (ref 0–0.04)
IMM GRANULOCYTES NFR BLD AUTO: 0.2 % (ref 0–0.5)
LYMPHOCYTES # BLD AUTO: 1.2 K/UL (ref 1–4.8)
LYMPHOCYTES NFR BLD: 28 % (ref 18–48)
MAGNESIUM SERPL-MCNC: 1 MG/DL (ref 1.6–2.6)
MCH RBC QN AUTO: 33.9 PG (ref 27–31)
MCHC RBC AUTO-ENTMCNC: 31.8 G/DL (ref 32–36)
MCV RBC AUTO: 107 FL (ref 82–98)
MONOCYTES # BLD AUTO: 0.4 K/UL (ref 0.3–1)
MONOCYTES NFR BLD: 8.7 % (ref 4–15)
NEUTROPHILS # BLD AUTO: 2.5 K/UL (ref 1.8–7.7)
NEUTROPHILS NFR BLD: 59.2 % (ref 38–73)
NRBC BLD-RTO: 0 /100 WBC
PHOSPHATE SERPL-MCNC: 2.3 MG/DL (ref 2.7–4.5)
PLATELET # BLD AUTO: 215 K/UL (ref 150–350)
PMV BLD AUTO: 11 FL (ref 9.2–12.9)
POTASSIUM SERPL-SCNC: 4.2 MMOL/L (ref 3.5–5.1)
PROT SERPL-MCNC: 6.5 G/DL (ref 6–8.4)
RBC # BLD AUTO: 3.07 M/UL (ref 4–5.4)
SODIUM SERPL-SCNC: 144 MMOL/L (ref 136–145)
T GONDII IGM SER-ACNC: <3 AU/ML
WBC # BLD AUTO: 4.15 K/UL (ref 3.9–12.7)

## 2019-04-15 PROCEDURE — 97535 SELF CARE MNGMENT TRAINING: CPT | Mod: HCNC

## 2019-04-15 PROCEDURE — 99238 PR HOSPITAL DISCHARGE DAY,<30 MIN: ICD-10-PCS | Mod: HCNC,GC,, | Performed by: INTERNAL MEDICINE

## 2019-04-15 PROCEDURE — 84100 ASSAY OF PHOSPHORUS: CPT | Mod: HCNC

## 2019-04-15 PROCEDURE — 99238 HOSP IP/OBS DSCHRG MGMT 30/<: CPT | Mod: HCNC,GC,, | Performed by: INTERNAL MEDICINE

## 2019-04-15 PROCEDURE — 36415 COLL VENOUS BLD VENIPUNCTURE: CPT | Mod: HCNC

## 2019-04-15 PROCEDURE — 97165 OT EVAL LOW COMPLEX 30 MIN: CPT | Mod: HCNC

## 2019-04-15 PROCEDURE — 85025 COMPLETE CBC W/AUTO DIFF WBC: CPT | Mod: HCNC

## 2019-04-15 PROCEDURE — 25000003 PHARM REV CODE 250: Mod: HCNC | Performed by: STUDENT IN AN ORGANIZED HEALTH CARE EDUCATION/TRAINING PROGRAM

## 2019-04-15 PROCEDURE — 83735 ASSAY OF MAGNESIUM: CPT | Mod: HCNC

## 2019-04-15 PROCEDURE — 92526 ORAL FUNCTION THERAPY: CPT | Mod: HCNC

## 2019-04-15 PROCEDURE — 97530 THERAPEUTIC ACTIVITIES: CPT | Mod: HCNC

## 2019-04-15 PROCEDURE — 25000003 PHARM REV CODE 250: Mod: HCNC | Performed by: INTERNAL MEDICINE

## 2019-04-15 PROCEDURE — 80053 COMPREHEN METABOLIC PANEL: CPT | Mod: HCNC

## 2019-04-15 RX ORDER — MAGNESIUM SULFATE HEPTAHYDRATE 40 MG/ML
4 INJECTION, SOLUTION INTRAVENOUS ONCE
Status: DISCONTINUED | OUTPATIENT
Start: 2019-04-15 | End: 2019-04-15 | Stop reason: HOSPADM

## 2019-04-15 RX ORDER — LORAZEPAM/0.9% SODIUM CHLORIDE 100MG/0.1L
2 PLASTIC BAG, INJECTION (ML) INTRAVENOUS ONCE
Status: CANCELLED | OUTPATIENT
Start: 2019-04-15 | End: 2019-04-15

## 2019-04-15 RX ORDER — LANOLIN ALCOHOL/MO/W.PET/CERES
800 CREAM (GRAM) TOPICAL 3 TIMES DAILY
Status: DISCONTINUED | OUTPATIENT
Start: 2019-04-15 | End: 2019-04-15 | Stop reason: HOSPADM

## 2019-04-15 RX ORDER — LEVETIRACETAM 500 MG/1
1000 TABLET ORAL 2 TIMES DAILY
Status: DISCONTINUED | OUTPATIENT
Start: 2019-04-15 | End: 2019-04-15 | Stop reason: HOSPADM

## 2019-04-15 RX ORDER — SODIUM,POTASSIUM PHOSPHATES 280-250MG
2 POWDER IN PACKET (EA) ORAL
Status: DISCONTINUED | OUTPATIENT
Start: 2019-04-15 | End: 2019-04-15 | Stop reason: HOSPADM

## 2019-04-15 RX ORDER — LANOLIN ALCOHOL/MO/W.PET/CERES
800 CREAM (GRAM) TOPICAL 2 TIMES DAILY
Refills: 0 | COMMUNITY
Start: 2019-04-15 | End: 2019-04-25

## 2019-04-15 RX ORDER — MAGNESIUM SULFATE HEPTAHYDRATE 40 MG/ML
2 INJECTION, SOLUTION INTRAVENOUS ONCE
Status: DISCONTINUED | OUTPATIENT
Start: 2019-04-15 | End: 2019-04-15

## 2019-04-15 RX ORDER — LOSARTAN POTASSIUM 25 MG/1
25 TABLET ORAL DAILY
Status: DISCONTINUED | OUTPATIENT
Start: 2019-04-15 | End: 2019-04-15 | Stop reason: HOSPADM

## 2019-04-15 RX ORDER — ENOXAPARIN SODIUM 100 MG/ML
40 INJECTION SUBCUTANEOUS EVERY 24 HOURS
Status: DISCONTINUED | OUTPATIENT
Start: 2019-04-15 | End: 2019-04-15 | Stop reason: HOSPADM

## 2019-04-15 RX ADMIN — FOLIC ACID 1000 MCG: 1 TABLET ORAL at 10:04

## 2019-04-15 RX ADMIN — DIAZEPAM 2 MG: 2 TABLET ORAL at 10:04

## 2019-04-15 RX ADMIN — VITAMIN C 1 TABLET: TAB at 10:04

## 2019-04-15 RX ADMIN — CYANOCOBALAMIN TAB 1000 MCG 1000 MCG: 1000 TAB at 10:04

## 2019-04-15 RX ADMIN — LEVETIRACETAM 1000 MG: 500 TABLET, FILM COATED ORAL at 10:04

## 2019-04-15 RX ADMIN — LOSARTAN POTASSIUM 25 MG: 25 TABLET, FILM COATED ORAL at 10:04

## 2019-04-15 RX ADMIN — MAGNESIUM OXIDE TAB 400 MG (241.3 MG ELEMENTAL MG) 800 MG: 400 (241.3 MG) TAB at 10:04

## 2019-04-15 NOTE — PLAN OF CARE
CM met with patient for discharge planning.  Patient states she lives in an assisted living facility called [facility] the [facility].  She states she is independent with ADL's.  Plan is to discharge back to assisted living facility.    Pharmacy:    [facility]/pharmacy #[id number] - [place], [place] - [address]  [address] [address] [address]  Phone: [phone] Fax: [phone]    PCP:  Jarrell Del Valle MD    Payor: [employer] MANAGED MEDICARE / Plan: [employer] MEDICARE HMO / Product Type: Capitation /      04/15/19 1045   Discharge Assessment   Assessment Type Discharge Planning Assessment   Confirmed/corrected address and phone number on facesheet? Yes   Assessment information obtained from? Patient   Expected Length of Stay (days) 3   Communicated expected length of stay with patient/caregiver yes   Prior to hospitilization cognitive status: Not Oriented to Time   Prior to hospitalization functional status: Independent   Current cognitive status: Not Oriented to Time   Current Functional Status: Independent   Facility Arrived From: Emergency room   Lives With alone   Able to Return to Prior Arrangements yes   Is patient able to care for self after discharge? Unable to determine at this time (comments)   Who are your caregiver(s) and their phone number(s)? Cora Garcia - friend [phone]   Patient's perception of discharge disposition home or selfcare   Readmission Within the Last 30 Days no previous admission in last 30 days   Patient currently being followed by outpatient case management? No   Patient currently receives any other outside agency services? No   Equipment Currently Used at Home none   Do you have any problems affording any of your prescribed medications? No   Is the patient taking medications as prescribed? yes   Does the patient have transportation home? No   Does the patient receive services at the [facility]? No   Discharge Plan A Home   Discharge Plan B Home Health   DME Needed  Upon Discharge  none   Patient/Family in Agreement with Plan yes

## 2019-04-15 NOTE — PT/OT/SLP PROGRESS
Physical Therapy      Patient Name:  Breonna Silva   MRN:  [id number]    Pt screened for mobility and acute PT needs; seen ambulating in halls c no deficits and states not needing any therapy.  Orders acknowledged and d/c.    Rafa Price, PT   4/15/2019

## 2019-04-15 NOTE — SIGNIFICANT EVENT
EMR and backup systems were down today. Patient was seen and examined by myself and the rest of the hospital medicine 1 team, using written orders as needed.     Ms. Silva is a 63-year-old woman with with HTN, seizure disorder, and Parkinson's dementia who presented with decreased PO intake and neck pain and was admitted with EUGENE secondary to volume-depletion from poor intake due to a soft palate mass. She remains stable making adequate urine, though no new BMP is available for review.     Today she voiced a desire to leave the hospital, which would be against medical advice. Though oriented and able to communicate a choice, she is unable to demonstrate understanding or appreciation for the consequences of leaving the hospital with incompletely treated severe EUGENE, and therefore from my evaluation lacks the capacity to make that decision (see Assessing Patients' Capacities to Consent to Treatment, Dandy 1998). Multiple attempts to contact her healthcare power of  were unsuccessful, going straight to voicemail. She was amenable to staying for treatment when we left, but if she were to decide to leave she would require PEC.    Continue LR and sodium bicarbonate for EUGENE     Post-downtime orders reviewed.    Time spent in care of the patient (Greater than 1/2 spent in direct face-to-face contact) 28 minutes     Hebert Dorado M.D.  Department of Hospital Medicine  [facility]  [phone] (pager)

## 2019-04-15 NOTE — DISCHARGE SUMMARY
[facility] Hospital Medicine  Discharge Summary      Patient Name: Breonna Silva  MRN: [id number]  Admission Date: 2019  Hospital Length of Stay: 3 days  Discharge Date and Time:  04/15/2019 11:01 AM  Attending Physician: Hebert Dorado MD   Discharging Provider: Ye Vance MD  Primary Care Provider: Jarrell Del Valle MD  Hospital Medicine Team: [facility] Ye Vance MD    HPI:   This is a 63 y.o. female with  hx of seizures, HTN, and secondary parkinsonism, dementia, who presents to the ED with a complaint of strong neck pain with associated symptoms of left ear pain, active cough, and trouble swallowing, for the last month.     Patient also reports having lost weight and not eating or drinking as much as she usually does. Patient denies fever, belly pain, chest pain, abdominal pain, shortness of breath and pain while urinating. Denies any use of NSAIDs.    Social: Lives alone per pt and , she is independent in daily living. Animal exposure- Cat (pet). All her family member including her  has passed away, She is her power of  but in case pt lost her Capacity her Power  is Ms. mary Garcia [phone].  FHX: Mother  from breast Ca at age 32.    Screening test: Last coloscopy  (NL per pt) - never had mammogram done.    In ED, She was hypotensive, given 30 cc/kg NS + Unycn x1, ENT evaluated the pt which they did not feel the mass is an abscess, recommended outpt follow up. Pt was admitted to hospital medicine for acute renal failure.         * No surgery found *      Hospital Course:   Patient admitted to 1 via ED for EUGENE with admission Cr of 6. Patient given IVF for 48 hours and Cr improved to 0.7. Patient also evaluated in ED by ENT for left sided neck mass and dysphagia, suspected to be a mass and scheduled for outpatient direct laryngoscopy and biopsy with ENT on . Patient throughout hospital stay with desires  to leave AMA and refusing some therapies including lab draws and some IV medications. Patient stable for discharge 4/15, though ideally would have received magnesium replacement for hypomag but patient refused. F/u with ENT 4/16 for biopsy and PCP in 1 week.      Consults:   Consults (From admission, onward)        Status Ordering Provider     Inpatient consult to ENT  Once     Provider:  (Not yet assigned)    Completed MANDO KAISER     Inpatient consult to PICC team ([facility])  Once     Provider:  (Not yet assigned)    Acknowledged CARLEEN DELA CRUZ          * Acute renal failure-resolved as of 4/15/2019  -prerenal from dehydration, resolved with IVF       Neck mass  - 1 month hx of ear pain, difficulty swallowing and left side swelling. Animal exposure - Cat (pet)  - FHx of breast Ca.    MRI- brain  03/25- questioned edema and enhancement in the left infratemporal fossa/parapharyngeal space partially visualized cannot exclude underlying lesion  CT-Neck 04/12- there is an abscess in the left pharyngeal mucosal space, with some mild mass effect on the airway.  Although imaged in a different modality today, extent appears fairly similar to prior study.    - ENT evaluated the patient in ED - No acute ENT intervention at this time/ schedule close follow-up early next week to arrange for DL with biopsy - [facility]    DDx: Infectious causes vs malignancy     Plan:  1- Follow up HIV/ RPR/ Q-TB/Toxo/CMV/EBV/ Bartonella.              Final Active Diagnoses:    Diagnosis Date Noted POA    Neck mass [R22.1] 04/13/2019 Yes    DNR (do not resuscitate) [Z66] 04/13/2019 Yes    Anemia [D64.9] 04/13/2019 Yes    Odynophagia [R13.10] 04/13/2019 Yes    Alcohol dependence with alcohol-induced persisting dementia [F10.27] 11/13/2018 Yes    Dementia [F03.90] 08/31/2018 Yes    Essential hypertension, benign [I10] 03/02/2015 Yes     Chronic    Seizure [R56.9] 02/07/2015 Yes    Secondary parkinsonism [G21.9]  09/28/2012 Yes      Problems Resolved During this Admission:    Diagnosis Date Noted Date Resolved POA    PRINCIPAL PROBLEM:  Acute renal failure [N17.9] 04/12/2019 04/15/2019 Yes    Hypotension, unspecified [I95.9] 04/13/2019 04/15/2019 Yes       Discharged Condition: stable    Disposition: Home or Self Care    Follow Up: ENT 4/16, PCP 1 week  Follow-up Information     Jarrell Del Valle MD In 1 week.    Specialty:  Internal Medicine  Contact information:  [address] [place] [id number]  [phone]             TONEY Huitron MD In 1 day.    Specialty:  Otolaryngology  Contact information:  [address]  [address]  [place] [id number]  [phone]                 Patient Instructions:   No discharge procedures on file.    Significant Diagnostic Studies: Labs:   CMP   Recent Labs   Lab 04/14/19  1036 04/15/19  0710    144   K 4.4 4.2    110   CO2 21* 23   GLU 93 93   BUN 42* 25*   CREATININE 1.1 0.7   CALCIUM 7.9* 8.6*   PROT 6.2 6.5   ALBUMIN 3.1* 3.3*   BILITOT 0.2 0.3   ALKPHOS 60 52*   AST 15 16   ALT 7* 8*   ANIONGAP 10 11   ESTGFRAFRICA >60.0 >60.0   EGFRNONAA 53.6* >60.0    and CBC   Recent Labs   Lab 04/14/19  1036 04/15/19  0710   WBC 3.54* 4.15   HGB 10.3* 10.4*   HCT 32.6* 32.7*    215       Pending Diagnostic Studies:     Procedure Component Value Units Date/Time    CBC with Automated Differential [[id number]]     Order Status:  Sent Lab Status:  No result     Specimen:  Blood     Comprehensive Metabolic Panel (CMP) [[id number]]     Order Status:  Sent Lab Status:  No result     Specimen:  Blood     HIV 1/2 Ag/Ab (4th Gen) [[id number]] Collected:  04/12/19 2339    Order Status:  Sent Lab Status:  In process Updated:  04/13/19 0010    Specimen:  Blood     Magnesium [[id number]]     Order Status:  Sent Lab Status:  No result     Specimen:  Blood     Phosphorus [[id number]]     Order Status:  Sent Lab Status:  No result     Specimen:  Blood     Quantiferon Gold TB  [[id number]] Collected:  04/12/19 2339    Order Status:  Sent Lab Status:  In process Updated:  04/13/19 0002    Specimen:  Blood          Medications:  Reconciled Home Medications:      Medication List      START taking these medications    magnesium oxide 400 mg (241.3 mg magnesium) tablet  Commonly known as:  MAG-OX  Take 2 tablets (800 mg total) by mouth 2 (two) times daily. for 10 days        CONTINUE taking these medications    b complex vitamins tablet  Take 1 tablet by mouth once daily.     diazePAM 2 MG tablet  Commonly known as:  VALIUM  TAKE 1 TABLET BY MOUTH 3 TIMES A DAY AS NEEDED FOR ANXIETY     folic acid 800 MCG Tab  Commonly known as:  FOLVITE  Take 800 mcg by mouth once daily.     gabapentin 300 MG capsule  Commonly known as:  NEURONTIN  TAKE 1 CAPSULE (300 MG TOTAL) BY MOUTH EVERY EVENING.     levETIRAcetam 1000 MG tablet  Commonly known as:  KEPPRA  TAKE 1 TABLET BY MOUTH TWICE A DAY     losartan 25 MG tablet  Commonly known as:  COZAAR  Take 1 tablet (25 mg total) by mouth once daily.     meclizine 25 mg tablet  Commonly known as:  ANTIVERT  TAKE 1 TABLET (25 MG TOTAL) BY MOUTH 3 (THREE) TIMES DAILY AS NEEDED FOR DIZZINESS.     melatonin 5 mg Tab  Take 1 tablet (5 mg total) by mouth nightly as needed.     neomycin-polymyxin-hydrocortisone otic solution  Commonly known as:  CORTISPORIN  Three drops to affected ear 4 times daily     potassium chloride SA 20 MEQ tablet  Commonly known as:  K-DUR,KLOR-CON  Take 1 tablet (20 mEq total) by mouth daily as needed (when taking furosemide for swelling).     VITAMIN B-12 1000 MCG tablet  Generic drug:  cyanocobalamin  Take 100 mcg by mouth once daily.        STOP taking these medications    ketorolac 10 mg tablet  Commonly known as:  TORADOL            Indwelling Lines/Drains at time of discharge:   Lines/Drains/Airways          None          Time spent on the discharge of patient: 31 minutes  Patient was seen and examined on the date of discharge and  determined to be suitable for discharge.         Ye Vance MD  Department of Hospital Medicine  [facility]

## 2019-04-15 NOTE — PLAN OF CARE
Problem: Occupational Therapy Goal  Goal: Occupational Therapy Goal  Outcome: Outcome(s) achieved Date Met: 04/14/19  Eval complete. Pt tolerated session well. D/C from OT.

## 2019-04-15 NOTE — PLAN OF CARE
Problem: SLP Goal  Goal: SLP Goal  Speech Language Pathology Goals  Goals expected to be met by 4/20  1. Pt will tolerate dental soft diet with thin liquids without overt s/s aspiration.        Outcome: Ongoing (interventions implemented as appropriate)  Pt participated in tx session given encouragement.  Cont ST per POC.    Christy Shah CCC-SLP  4/15/2019

## 2019-04-15 NOTE — PLAN OF CARE
Plan is to discharge home via  van.    Future Appointments   Date Time Provider Department Center   4/16/2019  9:15 AM RUTH Huitron MD [facility] ENT [facility]   4/23/2019 11:00 AM Kapil Llanos MD [facility]   4/24/2019  9:20 AM Jarrell Del Valle MD [facility] IM [facility]   5/8/2019 11:00 AM Baldo Saul MD [facility] [place]   5/28/2019  1:00 PM Baldo Saul MD [facility] [place]        04/15/19 1533   Final Note   Assessment Type Final Discharge Note   Anticipated Discharge Disposition Home   Right Care Referral Info   Post Acute Recommendation No Care

## 2019-04-15 NOTE — HOSPITAL COURSE
Patient admitted to [facility] via ED for EUGENE with admission Cr of 6. Patient given IVF for 48 hours and Cr improved to 0.7. Patient also evaluated in ED by ENT for left sided neck mass and dysphagia, suspected to be a mass and scheduled for outpatient direct laryngoscopy and biopsy with ENT on 4/16. Patient throughout hospital stay with desires to leave AMA and refusing some therapies including lab draws and some IV medications. Patient stable for discharge 4/15, though ideally would have received magnesium replacement for hypomag but patient refused. F/u with ENT 4/16 for biopsy and PCP in 1 week.

## 2019-04-15 NOTE — PT/OT/SLP EVAL
"Occupational Therapy   Evaluation and Discharge Note    Name: Breonna Silva  MRN: [id number]  Admitting Diagnosis:  Acute renal failure      Recommendations:     Discharge Recommendations: (Return to [facility])  Discharge Equipment Recommendations:  none  Barriers to discharge:  None    Assessment:     Breonna Silva is a 63 y.o. female with a medical diagnosis of Acute renal failure. At this time, patient is functioning at their prior level of function and does not require further acute OT services.     Plan:     During this hospitalization, patient does not require further acute OT services.  Please re-consult if situation changes.    · Plan of Care Reviewed with: patient    Subjective     Chief Complaint: "Can't I go home for an hour to feed my cat and get some clothes.   Patient/Family Comments/goals: Medical management and discharge.     Occupational Profile:  Living Environment: In an [facility] w/ w/c access, and other home modifications.   Previous level of function: Independent w/ ADLs/IADLs  Roles and Routines: , cat owner, , diagnosed w/ Parkinson's 3 years ago, former .  Equipment Used at home:  none  Assistance upon Discharge: Yes from self    Pain/Comfort:  ·      Patients cultural, spiritual, Adventist conflicts given the current situation: no    Objective:     Communicated with: RN prior to session.  Patient found HOB elevated with pulse ox (continuous), telemetry upon OT entry to room.    General Precautions: Standard, aspiration, fall   Orthopedic Precautions:N/A   Braces:       Occupational Performance:    Bed Mobility:    · Patient completed Rolling/Turning to Left with  independence  · Patient completed Rolling/Turning to Right with independence  · Patient completed Scooting/Bridging with independence  · Patient completed Supine to Sit with independence  · Patient completed Sit to Supine with independence    Functional Mobility/Transfers:  · Patient completed Sit <> " Stand Transfer with independence  with  no assistive device   · Patient completed Bed <> Chair Transfer using Step Transfer technique with independence with no assistive device  · Patient completed Toilet Transfer Step Transfer technique with independence with  no AD  · Functional Mobility: Able to walk multiple long household distances w/o AD independently.     Activities of Daily Living:  · Feeding:  independence sitting in chair  · Grooming: independence standing at sink  · Bathing: independence standing at sink  · Upper Body Dressing: independence seated at EOB  · Lower Body Dressing: independence seated at EOB  · Toileting: independence seated on toilet.    Cognitive/Visual Perceptual:  Completely cognitively intact adult w/ no glasses worn or present during eval.       Physical Exam:  Balance:    -       good  Motor Planning:    -       very slight BUE resting hand tremor.   Dominant hand:    -       RH  Upper Extremity Range of Motion:     -       Right Upper Extremity: WFL  -       Left Upper Extremity: WFL  Upper Extremity Strength:    -       Right Upper Extremity: WFL  -       Left Upper Extremity: WFL   Strength:    -       Right Upper Extremity: WFL  -       Left Upper Extremity: WFL  Fine Motor Coordination:    -       Intact  Gross motor coordination:   WFL    AMPAC 6 Click ADL:  AMPAC Total Score:      Treatment & Education:  -Pt edu on OT role/POC  -Importance of OOB activity with staff assistance  -Safety during functional t/f and mobility  - White board updated  - Multiple self care tasks/functional mobility completed-- assistance level noted above  - All questions/concerns answered within OT scope of practice    Education:    Patient left HOB elevated with all lines intact, call button in reach and RN notified    GOALS:   Multidisciplinary Problems     Occupational Therapy Goals     Not on file          Multidisciplinary Problems (Resolved)        Problem: Occupational Therapy Goal    Goal  Priority Disciplines Outcome Interventions   Occupational Therapy Goal   (Resolved)     OT, PT/OT Outcome(s) achieved                    History:     Past Medical History:   Diagnosis Date    Anxiety     Dementia     Depression     Hx of psychiatric care     Hypertension     Psychiatric problem     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    Seizures     Therapy     Transient loss of consciousness 9/28/2012    presumed seizures       Past Surgical History:   Procedure Laterality Date    BLOCK STELLATE GANGLION Right 12/22/2017    [facility] at [facility]    BLOCK-STELLATE GANGLION Right 8/1/2017    Performed by Brian Atkins MD at [facility]       Time Tracking:     OT Date of Treatment: 04/14/19  OT Start Time: 1433  OT Stop Time: 1537  OT Total Time (min): 64 min    Billable Minutes:Evaluation 10 mins  Self Care/Home Management 30 mins  Therapeutic Activity 24    Adan Vale, OT  4/15/2019

## 2019-04-15 NOTE — ASSESSMENT & PLAN NOTE
- 1 month hx of ear pain, difficulty swallowing and left side swelling. Animal exposure - Cat (pet)  - FHx of breast Ca.    MRI- brain  03/25- questioned edema and enhancement in the left infratemporal fossa/parapharyngeal space partially visualized cannot exclude underlying lesion  CT-Neck 04/12- there is an abscess in the left pharyngeal mucosal space, with some mild mass effect on the airway.  Although imaged in a different modality today, extent appears fairly similar to prior study.    - ENT evaluated the patient in ED - No acute ENT intervention at this time/ schedule close follow-up early next week to arrange for DL with biopsy - [facility]    DDx: Infectious causes vs malignancy     Plan:  1- Follow up HIV/ RPR/ Q-TB/Toxo/CMV/EBV/ Bartonella.

## 2019-04-15 NOTE — NURSING
Pt discharged to assisted living facility. No apparent distress or discomfort present. Pt transported by [facility] transportation via . Received discharge instructions.

## 2019-04-15 NOTE — DISCHARGE INSTRUCTIONS
Follow up with PCP in 1 week. Follow up with ENT 4/16. Return to hospital if dysphagia worsens and cannot tolerate food by mouth, or if shortness of breath occurs.

## 2019-04-15 NOTE — PLAN OF CARE
[facility]     Department of Hospital Medicine     [address] [place] [address]     [phone] after hours  [phone] fax       NURSING HOME ORDERS    04/15/2019    Admit to Nursing Home:  Regular Bed      Diagnoses:  Active Hospital Problems    Diagnosis  POA    *Acute renal failure [N17.9]  Yes    Neck mass [R22.1]  Yes    DNR (do not resuscitate) [Z66]  Yes    Hypotension, unspecified [I95.9]  Yes    Anemia [D64.9]  Yes    Odynophagia [R13.10]  Yes    Alcohol dependence with alcohol-induced persisting dementia [F10.27]  Yes    Dementia [F03.90]  Yes     2011 Neuropsychological testing is consistent with moderate dementia      Essential hypertension, benign [I10]  Yes     Chronic    Seizure [R56.9]  Yes     Maintained on keppra      Secondary parkinsonism [G21.9]  Yes     Failed multiple meds        Resolved Hospital Problems   No resolved problems to display.       Patient is homebound due to:  Acute renal failure    Allergies:Review of patient's allergies indicates:  No Known Allergies    Vitals:      Routine, once monthly         Diet: soft   Supplement:  1 can every three times a day with meals                         Type:  House        Acitivities: - Up in a chair each morning as tolerated   - May ambulate independently   - May use walker, cane, or self-propelled wheelchair      LABS:  Nursing Precautions:    - Aspiration precautions:             - Total assistance with meals            -  Upright 90 degrees befor during and after meals             -  Suction at bedside          - Fall precautions per nursing home protocol   - Seizure precaution per nursing home protocol       Medications: Discontinue all previous medication orders, if any. See new list below.      Capri Silva   Home Medication Instructions LEONA:[id number]    Printed on:04/15/19 9517   Medication Information                      b complex vitamins tablet  Take 1 tablet  by mouth once daily.             cyanocobalamin (VITAMIN B-12) 1000 MCG tablet  Take 100 mcg by mouth once daily.               diazePAM (VALIUM) 2 MG tablet  TAKE 1 TABLET BY MOUTH 3 TIMES A DAY AS NEEDED FOR ANXIETY             folic acid (FOLVITE) 800 MCG Tab  Take 800 mcg by mouth once daily.               gabapentin (NEURONTIN) 300 MG capsule  TAKE 1 CAPSULE (300 MG TOTAL) BY MOUTH EVERY EVENING.             levETIRAcetam (KEPPRA) 1000 MG tablet  TAKE 1 TABLET BY MOUTH TWICE A DAY             losartan (COZAAR) 25 MG tablet  Take 1 tablet (25 mg total) by mouth once daily.             magnesium oxide (MAG-OX) 400 mg (241.3 mg magnesium) tablet  Take 2 tablets (800 mg total) by mouth 2 (two) times daily. for 10 days             meclizine (ANTIVERT) 25 mg tablet  TAKE 1 TABLET (25 MG TOTAL) BY MOUTH 3 (THREE) TIMES DAILY AS NEEDED FOR DIZZINESS.             melatonin 5 mg Tab  Take 1 tablet (5 mg total) by mouth nightly as needed.             neomycin-polymyxin-hydrocortisone (CORTISPORIN) otic solution  Three drops to affected ear 4 times daily             potassium chloride SA (K-DUR,KLOR-CON) 20 MEQ tablet  Take 1 tablet (20 mEq total) by mouth daily as needed (when taking furosemide for swelling).                       _________________________________  Ye Vance MD  04/15/2019

## 2019-04-15 NOTE — PT/OT/SLP PROGRESS
"Speech Language Pathology Treatment    Patient Name:  Breonna Silva   MRN:  [id number]  Admitting Diagnosis: Acute renal failure    Recommendations:                 General Recommendations:  Dysphagia therapy  Diet recommendations:  Dental Soft, Liquid Diet Level: Thin   Aspiration Precautions: 1 bite/sip at a time, Alternating bites/sips, HOB to 90 degrees, Meds whole 1 at a time, Monitor for s/s of aspiration, Small bites/sips and Standard aspiration precautions   General Precautions: Standard, aspiration, fall  Communication strategies:  none    Subjective     "Can you take me down to the lobby?" Pt asking SLP  Patient goals: to go home     Pain/Comfort:  · Pain Rating 1: 0/10  · Pain Rating Post-Intervention 1: 0/10    Objective:     Has the patient been evaluated by SLP for swallowing?   Yes  Keep patient NPO? No   Current Respiratory Status: room air      Pt was seated upright in a chair awaiting transport for discharge upon SLP presentation.  She reported no difficulty w/ meals just ongoing pain w/ swallow.  She was offered and accepted po trials of thin liquids by cup.  She tolerated all sips well w/ no overt signs of aspiration.  She did c/o pain w/ sips w/ notable grimacing.  She declined soft solid trials.  She was provided education re: SLP role, diet recs, aspiration precautions, and SLP POC.  She indicated good understanding and agreed w/ recommendations.    Assessment:     Breonna Silva is a 63 y.o. female with an SLP diagnosis of Dysphagia.  She presents with good progress towards goals at this time.    Goals:   Multidisciplinary Problems     SLP Goals        Problem: SLP Goal    Goal Priority Disciplines Outcome   SLP Goal     SLP Ongoing (interventions implemented as appropriate)   Description:  Speech Language Pathology Goals  Goals expected to be met by 4/20  1. Pt will tolerate dental soft diet with thin liquids without overt s/s aspiration.                         Plan:     · Patient to be " seen:  3 x/week   · Plan of Care expires:     · Plan of Care reviewed with:  patient   · SLP Follow-Up:  Yes       Discharge recommendations:  other (see comments)(return to assisted living)   Barriers to Discharge:  None    Time Tracking:     SLP Treatment Date:   04/15/19  Speech Start Time:  1222  Speech Stop Time:  1234     Speech Total Time (min):  12 min    Billable Minutes: Treatment Swallowing Dysfunction 12    Christy Shah CCC-SLP  04/15/2019

## 2019-04-15 NOTE — PLAN OF CARE
Problem: Adult Inpatient Plan of Care  Goal: Plan of Care Review  No acute events throughout night. Pt AAO X 4; able to express needs.Moved patient to private room.  MD notified.    C/o pain to left ear.  Analgesic given as ordered.  Heat pack applied.   Safety maintained.   Bed in low position, call  light in reach.    Will continue to monitor

## 2019-04-16 ENCOUNTER — OFFICE VISIT (OUTPATIENT)
Dept: OTOLARYNGOLOGY | Facility: CLINIC | Age: 64
End: 2019-04-16
Payer: MEDICARE

## 2019-04-16 ENCOUNTER — INITIAL CONSULT (OUTPATIENT)
Dept: OTOLARYNGOLOGY | Facility: CLINIC | Age: 64
End: 2019-04-16
Payer: MEDICARE

## 2019-04-16 VITALS
BODY MASS INDEX: 19.37 KG/M2 | DIASTOLIC BLOOD PRESSURE: 96 MMHG | WEIGHT: 120 LBS | HEART RATE: 79 BPM | SYSTOLIC BLOOD PRESSURE: 179 MMHG

## 2019-04-16 VITALS
HEART RATE: 74 BPM | WEIGHT: 120 LBS | BODY MASS INDEX: 19.29 KG/M2 | DIASTOLIC BLOOD PRESSURE: 78 MMHG | SYSTOLIC BLOOD PRESSURE: 114 MMHG | HEIGHT: 66 IN

## 2019-04-16 DIAGNOSIS — G89.29 CHRONIC INTRACTABLE HEADACHE, UNSPECIFIED HEADACHE TYPE: ICD-10-CM

## 2019-04-16 DIAGNOSIS — R51.9 CHRONIC INTRACTABLE HEADACHE, UNSPECIFIED HEADACHE TYPE: ICD-10-CM

## 2019-04-16 DIAGNOSIS — J34.2 NASAL SEPTAL DEVIATION: ICD-10-CM

## 2019-04-16 DIAGNOSIS — R42 VERTIGO, CONSTANT: ICD-10-CM

## 2019-04-16 DIAGNOSIS — R25.2 TRISMUS: ICD-10-CM

## 2019-04-16 DIAGNOSIS — J35.8 TONSILLAR MASS: Primary | ICD-10-CM

## 2019-04-16 DIAGNOSIS — H92.02 LEFT EAR PAIN: ICD-10-CM

## 2019-04-16 DIAGNOSIS — J30.9 ALLERGIC RHINITIS, UNSPECIFIED SEASONALITY, UNSPECIFIED TRIGGER: ICD-10-CM

## 2019-04-16 DIAGNOSIS — R22.1 PARAPHARYNGEAL SPACE MASS: Primary | ICD-10-CM

## 2019-04-16 LAB
M TB IFN-G CD4+ BCKGRND COR BLD-ACNC: -0.03 IU/ML
MITOGEN IGNF BCKGRD COR BLD-ACNC: 8.37 IU/ML
MITOGEN IGNF BCKGRD COR BLD-ACNC: NEGATIVE [IU]/ML
NIL: 0.07 IU/ML
TB2 - NIL: -0.04 IU/ML

## 2019-04-16 PROCEDURE — 99214 PR OFFICE/OUTPT VISIT, EST, LEVL IV, 30-39 MIN: ICD-10-PCS | Mod: S$GLB,,, | Performed by: SPECIALIST

## 2019-04-16 PROCEDURE — 3078F DIAST BP <80 MM HG: CPT | Mod: CPTII,S$GLB,, | Performed by: SPECIALIST

## 2019-04-16 PROCEDURE — 3078F DIAST BP <80 MM HG: CPT | Mod: HCNC,CPTII,S$GLB, | Performed by: OTOLARYNGOLOGY

## 2019-04-16 PROCEDURE — 3008F PR BODY MASS INDEX (BMI) DOCUMENTED: ICD-10-PCS | Mod: HCNC,CPTII,S$GLB, | Performed by: OTOLARYNGOLOGY

## 2019-04-16 PROCEDURE — 99213 OFFICE O/P EST LOW 20 MIN: CPT | Mod: HCNC,S$GLB,, | Performed by: OTOLARYNGOLOGY

## 2019-04-16 PROCEDURE — 3008F BODY MASS INDEX DOCD: CPT | Mod: HCNC,CPTII,S$GLB, | Performed by: OTOLARYNGOLOGY

## 2019-04-16 PROCEDURE — 99999 PR PBB SHADOW E&M-EST. PATIENT-LVL III: CPT | Mod: PBBFAC,HCNC,, | Performed by: OTOLARYNGOLOGY

## 2019-04-16 PROCEDURE — 3078F PR MOST RECENT DIASTOLIC BLOOD PRESSURE < 80 MM HG: ICD-10-PCS | Mod: HCNC,CPTII,S$GLB, | Performed by: OTOLARYNGOLOGY

## 2019-04-16 PROCEDURE — 3074F PR MOST RECENT SYSTOLIC BLOOD PRESSURE < 130 MM HG: ICD-10-PCS | Mod: CPTII,S$GLB,, | Performed by: SPECIALIST

## 2019-04-16 PROCEDURE — 3008F BODY MASS INDEX DOCD: CPT | Mod: CPTII,S$GLB,, | Performed by: SPECIALIST

## 2019-04-16 PROCEDURE — 99213 PR OFFICE/OUTPT VISIT, EST, LEVL III, 20-29 MIN: ICD-10-PCS | Mod: HCNC,S$GLB,, | Performed by: OTOLARYNGOLOGY

## 2019-04-16 PROCEDURE — 99999 PR PBB SHADOW E&M-EST. PATIENT-LVL III: ICD-10-PCS | Mod: PBBFAC,HCNC,, | Performed by: OTOLARYNGOLOGY

## 2019-04-16 PROCEDURE — 3078F PR MOST RECENT DIASTOLIC BLOOD PRESSURE < 80 MM HG: ICD-10-PCS | Mod: CPTII,S$GLB,, | Performed by: SPECIALIST

## 2019-04-16 PROCEDURE — 3008F PR BODY MASS INDEX (BMI) DOCUMENTED: ICD-10-PCS | Mod: CPTII,S$GLB,, | Performed by: SPECIALIST

## 2019-04-16 PROCEDURE — 99214 OFFICE O/P EST MOD 30 MIN: CPT | Mod: S$GLB,,, | Performed by: SPECIALIST

## 2019-04-16 PROCEDURE — 3074F SYST BP LT 130 MM HG: CPT | Mod: HCNC,CPTII,S$GLB, | Performed by: OTOLARYNGOLOGY

## 2019-04-16 PROCEDURE — 3074F PR MOST RECENT SYSTOLIC BLOOD PRESSURE < 130 MM HG: ICD-10-PCS | Mod: HCNC,CPTII,S$GLB, | Performed by: OTOLARYNGOLOGY

## 2019-04-16 PROCEDURE — 3074F SYST BP LT 130 MM HG: CPT | Mod: CPTII,S$GLB,, | Performed by: SPECIALIST

## 2019-04-16 RX ORDER — LIDOCAINE HYDROCHLORIDE 10 MG/ML
1 INJECTION, SOLUTION EPIDURAL; INFILTRATION; INTRACAUDAL; PERINEURAL ONCE
Status: CANCELLED | OUTPATIENT
Start: 2019-04-16 | End: 2019-04-16

## 2019-04-16 RX ORDER — SODIUM CHLORIDE 0.9 % (FLUSH) 0.9 %
10 SYRINGE (ML) INJECTION
Status: CANCELLED | OUTPATIENT
Start: 2019-04-16

## 2019-04-16 RX ORDER — HYDROCODONE BITARTRATE AND ACETAMINOPHEN 5; 325 MG/1; MG/1
1 TABLET ORAL EVERY 6 HOURS PRN
Qty: 60 TABLET | Refills: 0 | Status: ON HOLD | OUTPATIENT
Start: 2019-04-16 | End: 2019-04-25 | Stop reason: HOSPADM

## 2019-04-16 NOTE — ASSESSMENT & PLAN NOTE
Worrisome for malignancy.  I recommended that we proceed to the OR for DL and biopsy.    She understands that the risks include pain, bleeding, infection, dental injury, tongue numbness, hoarseness, dysphagia, recurrence of the lesion in question, perforation of the upper aerodigestive tract, need for additional treatment and airway fire. She wishes to proceed with surgery.    Surgery is scheduled on 4/25/19.

## 2019-04-16 NOTE — PROGRESS NOTES
Subjective:       Patient ID: Breonna Silva is a 63 y.o. female.    Chief Complaint: Follow-up; Sinus Problem; and Ear Fullness    The patient is coming in for a follow-up visit.  She is quite upset over her8 recent treatment8.  She does not feel matters were explained to her well. She has had severe pain in the left ear and mastoid area that radiate up into the temple and down into the neck and throat for the last month.  She has developed trismus since onset of severe pain.  She is able to sustain nutrition only by taking very small bites that she chews on the right side and swallow slowly.  Her primary care physician ordered an MRI which showed a lesion in the infratemporal fossa and parapharyngeal space on the left.  A CT scan with IV contrast was subsequently ordered which demonstrated what was felt to be an abscess in the left parapharyngeal space.  The patient was subsequently [id number] for 2 days but left prior to resolution of the situation.  It was suggested that she see ear ear nose and throat doctor and she follow up with me.5    Review of Systems   Constitutional: Positive for fatigue. Negative for activity change, appetite change, chills, fever and unexpected weight change.   HENT: Positive for congestion, ear pain, postnasal drip, rhinorrhea, sore throat, trouble swallowing and voice change. Negative for ear discharge, facial swelling, hearing loss, mouth sores, sinus pressure, sinus pain, sneezing and tinnitus.         Radiation left otalgia into the left neck and throat areas   Eyes: Negative for photophobia, pain, discharge, redness, itching and visual disturbance.   Respiratory: Positive for cough. Negative for apnea, choking, shortness of breath and wheezing.    Cardiovascular: Negative for chest pain and palpitations.   Gastrointestinal: Negative for abdominal distention, abdominal pain, nausea and vomiting.   Musculoskeletal: Negative for arthralgias, myalgias, neck pain and neck  stiffness.   Skin: Negative.  Negative for color change, pallor and rash.   Allergic/Immunologic: Positive for environmental allergies. Negative for food allergies and immunocompromised state.   Neurological: Positive for dizziness, light-headedness and headaches. Negative for facial asymmetry, speech difficulty, weakness and numbness.   Hematological: Negative for adenopathy. Does not bruise/bleed easily.   Psychiatric/Behavioral: Negative for confusion, decreased concentration and sleep disturbance.       Objective:      Physical Exam   Constitutional: She is oriented to person, place, and time. She appears well-developed and well-nourished. She is cooperative.   HENT:   Head: Normocephalic.   Right Ear: External ear and ear canal normal. Tympanic membrane is retracted.   Left Ear: External ear and ear canal normal. Tympanic membrane is retracted.   Nose: Mucosal edema (cyanotic, boggy inferior turbinates bilaterally), rhinorrhea (clear mucus bilaterally) and septal deviation (To the right) present.   Mouth/Throat: Uvula is midline, oropharynx is clear and moist and mucous membranes are normal. No oral lesions.   Oral cavity-trismus with front teeth opening approximately 2 cm and severe pain elicited by any effort to open mouth wider   Eyes: Pupils are equal, round, and reactive to light. EOM and lids are normal. Right eye exhibits no discharge and no exudate. Left eye exhibits no discharge and no exudate. Right conjunctiva is injected. Left conjunctiva is injected.   Neck: Trachea normal and normal range of motion. No muscular tenderness present. No tracheal deviation present. No thyroid mass and no thyromegaly present.   Neck-severe tenderness beneath the angle of the mandible on the left in the left upper neck, no palpable mass  Bimanual-unable perform secondary to trismus   Cardiovascular: Normal rate, regular rhythm, normal heart sounds and normal pulses.   Pulmonary/Chest: Effort normal and breath sounds  normal. No stridor. She has no decreased breath sounds. She has no wheezes. She has no rhonchi. She has no rales.   Abdominal: Soft. Bowel sounds are normal. There is no tenderness.   Musculoskeletal: Normal range of motion.   Lymphadenopathy:        Head (right side): No submental, no submandibular, no preauricular, no posterior auricular and no occipital adenopathy present.        Head (left side): No submental, no submandibular, no preauricular, no posterior auricular and no occipital adenopathy present.     She has no cervical adenopathy.   Neurological: She is alert and oriented to person, place, and time. She has normal strength. No cranial nerve deficit or sensory deficit. Gait normal.   Skin: Skin is warm and dry. No petechiae and no rash noted. No cyanosis. Nails show no clubbing.   Psychiatric: She has a normal mood and affect. Her speech is normal and behavior is normal. Judgment and thought content normal. Cognition and memory are normal.       CT scan neck with IV contrast-suspect abscess in left parapharyngeal space base    MRI neck with:  IV  Contrast-edema and enhancement in the left infratemporal fossapara/pharyngeal space    Telephone consult:  I contacted the office of Dr. Dylan Pfeiffer  briefly discussion the patient's problem.  They were aware of her issues since she had been hospitalized on [address] [place].  He will see the patient this afternoon at 1:20 p.m.    Assessment:       1. Parapharyngeal space mass    2. Left ear pain    3. Trismus    4. Chronic intractable headache, unspecified headache type    5. Nasal septal deviation    6. Vertigo, constant    7. Allergic rhinitis, unspecified seasonality, unspecified trigger        Plan:       I discussed with the patient the possibility of her having an abscess or a  parapharyngeal space mass or lymph node with central necrosis.  After discussing the problem with Dr. Jalloh's office I will have the patient go there for 1:20 p.m. today.  I did  explain to the patient that I would be more than happy to have her see me in follow-up once the problem has been dealt with.

## 2019-04-16 NOTE — LETTER
April 16, 2019      Ozzy Hedrick MD  [address]  [facility] [address]           [facility]  [address]  [facility] [address]  Phone: [phone]  Fax: [phone]          Patient: Breonna Silva   MR Number: [id number]   YOB: 1955   Date of Visit: 4/16/2019       Dear Dr. Ozzy Hedrick:    Thank you for referring Breonna Silva to me for evaluation. Attached you will find relevant portions of my assessment and plan of care.    If you have questions, please do not hesitate to call me. I look forward to following Breonna Silva along with you.    Sincerely,    Dylan Pfeiffer MD    Enclosure  CC:  No Recipients    If you would like to receive this communication electronically, please contact [email or web address] or [phone] to request more information on [employer] access.    For providers and/or their staff who would like to refer a patient to [facility], please contact us through our one-stop-shop provider referral line, [facility], at [phone].    If you feel you have received this communication in error or would no longer like to receive these types of communications, please e-mail [email or web address]

## 2019-04-16 NOTE — PROGRESS NOTES
Chief Complaint   Patient presents with    Consult     suspected oropharynx cancer       HPI   63 y.o. female presents for evaluation of a L tonsil mass.  She reports a recent history of L throat and ear pain.  She reports that it is difficult to swallow due to this pain.  She was recently admitted to the hospital for ARF.  CT of the neck revealed a L oropharynx mass. She has never smoked.  She denies SOB.      Review of Systems   Constitutional: Negative for fatigue and unexpected weight change.   HENT: Per HPI.  Eyes: Negative for visual disturbance.   Respiratory: Negative for shortness of breath, hemoptysis   Cardiovascular: Negative for chest pain and palpitations.   Musculoskeletal: Negative for decreased ROM, back pain.   Skin: Negative for rash, sunburn, itching.   Neurological: Negative for dizziness and seizures.   Hematological: Negative for adenopathy. Does not bruise/bleed easily.   Endocrine: Negative for rapid weight loss/weight gain, heat/cold intolerance.     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    Anxiety    Cervical radiculopathy    Chronic pain    Cluster B personality disorder    Other symptoms and signs involving cognitive functions and awareness    Tremor, unspecified    Dementia    Alcohol dependence with alcohol-induced persisting dementia    Nasal septal deviation    Allergic rhinitis    Ear pain, left    Facial droop    Chronic prescription benzodiazepine use    Insomnia    Neck mass    DNR (do not resuscitate)    Anemia    Odynophagia           Past Surgical History   Past Surgical History:   Procedure Laterality Date    BLOCK STELLATE  GANGLION Right 12/22/2017    [facility]formed [facility]    BLOCK-STELLATE GANGLION Right 8/1/2017    Performed by [facility] at [facility]         Family History   History reviewed. No pertinent family history.        Social History   .  Social History     Socioeconomic History    Marital status:      Spouse name: Not on file    Number of children: Not on file    Years of education: Not on file    Highest education level: Not on file   Occupational History    Not on file   Social Needs    Financial resource strain: Not on file    Food insecurity:     Worry: Not on file     Inability: Not on file    Transportation needs:     Medical: Not on file     Non-medical: Not on file   Tobacco Use    Smoking status: Never Smoker    Smokeless tobacco: Never Used   Substance and Sexual Activity    Alcohol use: Yes     Alcohol/week: 1.0 oz     Types: 2 Standard drinks or equivalent per week    Drug use: No    Sexual activity: Not Currently   Lifestyle    Physical activity:     Days per week: Not on file     Minutes per session: Not on file    Stress: Not on file   Relationships    Social connections:     Talks on phone: Not on file     Gets together: Not on file     Attends Yazidism service: Not on file     Active member of club or organization: Not on file     Attends meetings of clubs or organizations: Not on file     Relationship status: Not on file   Other Topics Concern    Patient feels they ought to cut down on drinking/drug use Not Asked    Patient annoyed by others criticizing their drinking/drug use Not Asked    Patient has felt bad or guilty about drinking/drug use Not Asked    Patient has had a drink/used drugs as an eye opener in the AM Not Asked   Social History Narrative    Now lives at [facility] in [facility], an assisted living.         Allergies   Review of patient's allergies indicates:  No Known Allergies        Physical Exam     Vitals:     04/16/19 1321   BP: (!) 179/96   Pulse: 79         Body mass index is 19.37 kg/m².      General: AOx3, NAD   Respiratory:  Symmetric chest rise, normal effort  Left Ear: External Auditory Canal WNL,TM w/o masses/lesions/perforations.  Middle ear without evidence of effusion.   Nose: No gross nasal septal deviation. Inferior Turbinates WNL bilaterally. No septal perforation. No masses/lesions.   Oral Cavity:  Oral Tongue mobile, no lesions noted. Hard Palate WNL. No buccal or FOM lesions.  Oropharynx:  No masses/lesions of the posterior pharyngeal wall. 2 cm ulcerated lesion L tonsil. Soft palate without masses. Midline uvula.   Neck: No scars.  No cervical lymphadenopathy, thyromegaly or thyroid nodules.  Normal range of motion.    Face: House Brackmann I bilaterally.     Declined scope exam.    CT neck reviewed.       Assessment/Plan  Problem List Items Addressed This Visit        ENT    Tonsillar mass - Primary     Worrisome for malignancy.  I recommended that we proceed to the OR for DL and biopsy.    She understands that the risks include pain, bleeding, infection, dental injury, tongue numbness, hoarseness, dysphagia, recurrence of the lesion in question, perforation of the upper aerodigestive tract, need for additional treatment and airway fire. She wishes to proceed with surgery.           Relevant Medications    HYDROcodone-acetaminophen (NORCO) 5-325 mg per tablet

## 2019-04-17 LAB
BACTERIA BLD CULT: NORMAL
BACTERIA BLD CULT: NORMAL
HIV 1+2 AB+HIV1 P24 AG SERPL QL IA: NEGATIVE

## 2019-04-17 NOTE — PRE-PROCEDURE INSTRUCTIONS
Spoke with Geetha Garcia,  who has POA and Medical POA for her. She has no family.She said patient is able to talk aboat her medical condition and would try to get I touch with her  And have her call me back.

## 2019-04-18 ENCOUNTER — TELEPHONE (OUTPATIENT)
Dept: OTOLARYNGOLOGY | Facility: CLINIC | Age: 64
End: 2019-04-18

## 2019-04-18 LAB
B HENSELAE IGG SER QL: NEGATIVE TITER
B HENSELAE IGG SER QL: NEGATIVE TITER

## 2019-04-18 NOTE — TELEPHONE ENCOUNTER
----- Message from Beverly Sanford sent at 4/18/2019 11:33 AM CDT -----  Contact: pt at [phone]  Patient Returning Call from [facility]    Who Left Message for Patient:[facility] staff  Communication Preference:call  Additional Information:Missed call this morning.  Please try again

## 2019-04-18 NOTE — TELEPHONE ENCOUNTER
----- Message from Beverly Sanford sent at 4/18/2019 10:06 AM CDT -----  Contact: pt at [phone]  [facility] pt-pt was seen earlier this week and has not received her next appt.  Needs a biopsy?  [facility] to check on something before scheduling the appt.  May need a surgery appt..

## 2019-04-24 ENCOUNTER — ANESTHESIA EVENT (OUTPATIENT)
Dept: SURGERY | Facility: HOSPITAL | Age: 64
End: 2019-04-24
Payer: MEDICARE

## 2019-04-24 ENCOUNTER — TELEPHONE (OUTPATIENT)
Dept: OTOLARYNGOLOGY | Facility: CLINIC | Age: 64
End: 2019-04-24

## 2019-04-24 NOTE — PRE-PROCEDURE INSTRUCTIONS
Patient stated has not had any problems with anesthesia in the past. Preop instructions given. Hold asa, asa containing products, nsaids, vitamins and supplements starting now until after surgery. Medication instructions given. Shower the night before surgery and the morning of surgery with an antibacterial soap( hibiclens or dial antibacterial soap).  Nothing on the skin once shower. Do not apply any deodorant, lotion, powder, perfume,or aftershave. No makeup or moisturizer. No fingernail polish or jewelry going to surgery.   May have solid foods, gum, and hard candy until 8 hours before surgery/procedure time.  May have clear liquids( water, gatorade, powerade or apple juice) until 2 hours prior to surgery/procedure time.  No red or orange drinks. If in doubt , drink water. Nothing to drink 2 hours before arrival time for surgery/procedure. If you are told to take medication in the morning of surgery, it may be taken with a sip of water. If your doctor tells you something different pertaining to when to stop eating or drinking, follow your doctor's instructions.Call for changes in status or questions. Answered questions. Directions to surgery center given.Verbalizes understanding.

## 2019-04-24 NOTE — ANESTHESIA PREPROCEDURE EVALUATION
[facility]  Anesthesia Pre-Operative Evaluation         Patient Name: Breonna Silva  YOB: 1955  MRN: [id number]    SUBJECTIVE:     Pre-operative evaluation for Procedure(s) (LRB):  LARYNGOSCOPY (N/A)     04/24/2019    Breonna Silva is a 64 y.o. female w/ a significant PMHx of seizures, HTN, and secondary parkinsonism who has a 3cm by 2 cm left sided soft palat mass with extention into the parapharyngeal space.      Patient now presents for the above procedure(s).    This morning patient reports difficulty swallowing and breathing in the recumbent position.      LDA: None documented.    Prev airway: None documented.  Drips: None documented.    Patient Active Problem List   Diagnosis    Secondary parkinsonism    Transient loss of consciousness    Sleep walking    Vertigo, constant    Headache    Falls    Seizure    Essential hypertension, benign    Complex regional pain syndrome type 2 of right upper extremity    Mood disorder with mixed features due to general medical condition    Anxiety    Cervical radiculopathy    Chronic pain    Cluster B personality disorder    Other symptoms and signs involving cognitive functions and awareness    Tremor, unspecified    Dementia    Alcohol dependence with alcohol-induced persisting dementia    Nasal septal deviation    Allergic rhinitis    Ear pain, left    Facial droop    Chronic prescription benzodiazepine use    Insomnia    Neck mass    DNR (do not resuscitate)    Anemia    Odynophagia    Tonsillar mass       Review of patient's allergies indicates:  No Known Allergies    Current Outpatient Medications:  No current facility-administered medications for this encounter.     Current Outpatient Medications:     diazePAM (VALIUM) 2 MG tablet, TAKE 1 TABLET BY MOUTH 3 TIMES A DAY AS NEEDED FOR ANXIETY, Disp: 90  tablet, Rfl: 2    gabapentin (NEURONTIN) 300 MG capsule, TAKE 1 CAPSULE (300 MG TOTAL) BY MOUTH EVERY EVENING., Disp: 30 capsule, Rfl: 10    HYDROcodone-acetaminophen (NORCO) 5-325 mg per tablet, Take 1 tablet by mouth every 6 (six) hours as needed for Pain., Disp: 60 tablet, Rfl: 0    levETIRAcetam (KEPPRA) 1000 MG tablet, TAKE 1 TABLET BY MOUTH TWICE A DAY, Disp: 60 tablet, Rfl: 6    losartan (COZAAR) 25 MG tablet, Take 1 tablet (25 mg total) by mouth once daily., Disp: 90 tablet, Rfl: 3    meclizine (ANTIVERT) 25 mg tablet, TAKE 1 TABLET (25 MG TOTAL) BY MOUTH 3 (THREE) TIMES DAILY AS NEEDED FOR DIZZINESS., Disp: 30 tablet, Rfl: 0    b complex vitamins tablet, Take 1 tablet by mouth once daily., Disp: , Rfl:     cyanocobalamin (VITAMIN B-12) 1000 MCG tablet, Take 100 mcg by mouth once daily.  , Disp: , Rfl:     folic acid (FOLVITE) 800 MCG Tab, Take 800 mcg by mouth once daily.  , Disp: , Rfl:     magnesium oxide (MAG-OX) 400 mg (241.3 mg magnesium) tablet, Take 2 tablets (800 mg total) by mouth 2 (two) times daily. for 10 days, Disp: , Rfl: 0    melatonin 5 mg Tab, Take 1 tablet (5 mg total) by mouth nightly as needed., Disp: , Rfl:     neomycin-polymyxin-hydrocortisone (CORTISPORIN) otic solution, Three drops to affected ear 4 times daily, Disp: 1 Bottle, Rfl: 3    potassium chloride SA (K-DUR,KLOR-CON) 20 MEQ tablet, Take 1 tablet (20 mEq total) by mouth daily as needed (when taking furosemide for swelling)., Disp: 30 tablet, Rfl: 1    Past Surgical History:   Procedure Laterality Date    BLOCK STELLATE GANGLION Right 12/22/2017    Performed by [facility] at [facility]    BLOCK-STELLATE GANGLION Right 8/1/2017    Performed by [facility] at [facility]       Social History     Socioeconomic History    Marital status:      Spouse name: Not on file    Number of children: Not on file    Years of education: Not on file    Highest education level: Not on file    Occupational History    Not on file   Social Needs    Financial resource strain: Not on file    Food insecurity:     Worry: Not on file     Inability: Not on file    Transportation needs:     Medical: Not on file     Non-medical: Not on file   Tobacco Use    Smoking status: Never Smoker    Smokeless tobacco: Never Used   Substance and Sexual Activity    Alcohol use: Yes     Alcohol/week: 1.0 oz     Types: 2 Standard drinks or equivalent per week    Drug use: No    Sexual activity: Not Currently   Lifestyle    Physical activity:     Days per week: Not on file     Minutes per session: Not on file    Stress: Not on file   Relationships    Social connections:     Talks on phone: Not on file     Gets together: Not on file     Attends Jehovah's witness service: Not on file     Active member of club or organization: Not on file     Attends meetings of clubs or organizations: Not on file     Relationship status: Not on file   Other Topics Concern    Patient feels they ought to cut down on drinking/drug use Not Asked    Patient annoyed by others criticizing their drinking/drug use Not Asked    Patient has felt bad or guilty about drinking/drug use Not Asked    Patient has had a drink/used drugs as an eye opener in the AM Not Asked   Social History Narrative    Now lives at [facility] in [facility], an assisted living.       OBJECTIVE:     Vital Signs Range (Last 24H):         Significant Labs:  Lab Results   Component Value Date    WBC 4.15 04/15/2019    HGB 10.4 (L) 04/15/2019    HCT 32.7 (L) 04/15/2019     04/15/2019    CHOL 178 11/02/2016    TRIG 164 (H) 11/02/2016    HDL 71 11/02/2016    ALT 8 (L) 04/15/2019    AST 16 04/15/2019     04/15/2019    K 4.2 04/15/2019     04/15/2019    CREATININE 0.7 04/15/2019    BUN 25 (H) 04/15/2019    CO2 23 04/15/2019    TSH 1.762 05/09/2018    INR 1.2 02/07/2015    HGBA1C 4.5 11/02/2016       Diagnostic Studies: No relevant studies.    EKG:   Vent. Rate : 088 BPM      Atrial Rate : 088 BPM     P-R Int : 154 ms          QRS Dur : 082 ms      QT Int : 380 ms       P-R-T Axes : 069 022 054 degrees     QTc Int : 459 ms    Normal sinus rhythm  Low voltage QRS  Otherwise normal ECG  When compared with ECG of 07-SEP-2016 09:50,  No significant change was found  Confirmed by ELOISA WINSLOW MD ([id number]) on 4/13/2019 12:03:41 PM    2D ECHO:  · Normal left ventricular systolic function. The estimated ejection fraction is 60%  · Septal wall has abnormal motion.  · Normal LV diastolic function.  · Normal right ventricular systolic function.  · Elevated central venous pressure (15 mm Hg).         ASSESSMENT/PLAN:         Anesthesia Evaluation         Review of Systems  Anesthesia Hx:  No problems with previous Anesthesia Denies Family Hx of Anesthesia complications.   Denies Personal Hx of Anesthesia complications.   Social:  Non-Smoker, Alcohol Use  Alcohol Use:    Hematology/Oncology:     Oncology Normal    -- Anemia:   EENT/Dental:   Ears General/Symptom(s) Ear Symptoms: of vertigo and pain  Throat Symptoms (tonsilar mass, left throat pain)  include Swallowing difficulty or pain   Jaw Problems:  Limited mouth opening   Cardiovascular:  Functional Capacity 3 METS, can climb one flight of stairs per patient  Hypertension, Essential Hypertension , Recent typical clinic B/P of 179/96    Pulmonary:   Denies Shortness of breath.  Denies Recent URI.  Pulmonary Symptoms: (sob sometimes)    Renal/:  Renal/ Normal     Hepatic/GI:  Hepatic/GI Normal    Musculoskeletal:  Musculoskeletal General/Symptoms: Functional capacity is ambulatory with cane.  Cervical Spine Disorder, Cervical Disc Disease, Radiculopathy    Neurological:  Dx of Headaches Dementia (mild dementia)  Seizure Disorder , Most recent seizure occurred today Thinks she may have had a seizure today, takes keppra Neuromuscular Disease Cervical radiculopathy  Parkinsons  Psych:   Sleep Disorder and Insomnia. Anxiety Disorder.  Sleep  Disorder and Insomnia.        Physical Exam  General:  Well nourished    Airway/Jaw/Neck:  Airway Findings: Mouth Opening: Small, but > 3cm Tongue: Normal  Jaw/Neck Findings:  Neck ROM: Normal ROM     Eyes/Ears/Nose:  Eyes/Ears/Nose Findings: EOMI, mucus membranes moist    Dental:  Dental Findings: In tact   Chest/Lungs:  Chest/Lungs Findings: Clear to auscultation, Normal Respiratory Rate     Heart/Vascular:  Heart Findings: Rate: Normal  Rhythm: Regular Rhythm  Heart murmur: negative    Abdomen:  Abdomen Findings: Normal    Musculoskeletal:  Musculoskeletal Findings: Normal   Skin:  Skin Findings: Normal    Mental Status:  Mental Status Findings:  Cooperative, Alert and Oriented         Anesthesia Plan  Type of Anesthesia, risks & benefits discussed:  Anesthesia Type:  general  Patient's Preference:   Intra-op Monitoring Plan: standard ASA monitors  Intra-op Monitoring Plan Comments:   Post Op Pain Control Plan: per primary service following discharge from PACU, multimodal analgesia and IV/PO Opioids PRN  Post Op Pain Control Plan Comments:   Induction:   IV  Beta Blocker:  Patient is not currently on a Beta-Blocker (No further documentation required).       Informed Consent: Patient understands risks and agrees with Anesthesia plan.  Questions answered. Anesthesia consent signed with patient.  ASA Score: 3     Day of Surgery Review of History & Physical:    H&P update referred to the surgeon.     Anesthesia Plan Notes: Patient does not have a ride home set up. Discussed with Dr. Pfeiffer, who will plan to have her admitted.        Ready For Surgery From Anesthesia Perspective.

## 2019-04-24 NOTE — PRE ADMISSION SCREENING
Anesthesia Assessment: Preoperative EQUATION    Planned Procedure: Procedure(s) (LRB):  LARYNGOSCOPY (N/A)  Requested Anesthesia Type:General  Surgeon: Dylan Pfeiffer MD  Service: ENT  Known or anticipated Date of Surgery:4/25/2019    Surgeon notes: reviewed    Electronic QUestionnaire Assessment completed via nurse interview with patient.      NO AQ    Triage considerations:     The patient has no apparent active cardiac condition (No unstable coronary Syndrome such as severe unstable angina or recent [<1 month] myocardial infarction, decompensated CHF, severe valvular   disease or significant arrhythmia)    Previous anesthesia records:No problems and Not available    Last PCP note: within 1 month , within [facility]   Subspecialty notes: ENT, NEUROLOGY    Other important co-morbidities: SEIZURES, PARKINSONS DISEASE, VERTIGO, HTN, ANEMIA,     Tests already available:  Available tests,  within 1 month , within [facility] .4/15/2019 CBC, PHOS, MG, CMP, 4/13/2019 TTE,4/12/2019 EKG,             Instructions given. (See in Nurse's note)    Optimization: Consult      Plan:       Patient  has previously scheduled Medical Appointment:NONE    Navigation:                 Straight Line to surgery.

## 2019-04-25 ENCOUNTER — HOSPITAL ENCOUNTER (OUTPATIENT)
Facility: HOSPITAL | Age: 64
Discharge: HOME | End: 2019-04-25
Attending: OTOLARYNGOLOGY | Admitting: OTOLARYNGOLOGY
Payer: MEDICARE

## 2019-04-25 ENCOUNTER — ANESTHESIA (OUTPATIENT)
Dept: SURGERY | Facility: HOSPITAL | Age: 64
End: 2019-04-25
Payer: MEDICARE

## 2019-04-25 VITALS
HEART RATE: 97 BPM | RESPIRATION RATE: 16 BRPM | DIASTOLIC BLOOD PRESSURE: 68 MMHG | OXYGEN SATURATION: 98 % | TEMPERATURE: 99 F | WEIGHT: 120 LBS | HEIGHT: 65 IN | SYSTOLIC BLOOD PRESSURE: 120 MMHG | BODY MASS INDEX: 19.99 KG/M2

## 2019-04-25 DIAGNOSIS — J35.8 TONSILLAR MASS: Primary | ICD-10-CM

## 2019-04-25 PROCEDURE — 31535 PR LARYNGOSCOPY,DIRCT,OP,BIOPSY: ICD-10-PCS | Mod: HCNC,,, | Performed by: OTOLARYNGOLOGY

## 2019-04-25 PROCEDURE — D9220A PRA ANESTHESIA: Mod: HCNC,,, | Performed by: ANESTHESIOLOGY

## 2019-04-25 PROCEDURE — D9220A PRA ANESTHESIA: ICD-10-PCS | Mod: HCNC,,, | Performed by: ANESTHESIOLOGY

## 2019-04-25 PROCEDURE — 25000003 PHARM REV CODE 250: Mod: HCNC | Performed by: OTOLARYNGOLOGY

## 2019-04-25 PROCEDURE — 71000039 HC RECOVERY, EACH ADD'L HOUR: Mod: HCNC | Performed by: OTOLARYNGOLOGY

## 2019-04-25 PROCEDURE — 88305 TISSUE EXAM BY PATHOLOGIST: CPT | Mod: HCNC | Performed by: PATHOLOGY

## 2019-04-25 PROCEDURE — 36000709 HC OR TIME LEV III EA ADD 15 MIN: Mod: HCNC | Performed by: OTOLARYNGOLOGY

## 2019-04-25 PROCEDURE — 71000033 HC RECOVERY, INTIAL HOUR: Mod: HCNC | Performed by: OTOLARYNGOLOGY

## 2019-04-25 PROCEDURE — 63600175 PHARM REV CODE 636 W HCPCS: Mod: HCNC | Performed by: SURGERY

## 2019-04-25 PROCEDURE — 71000016 HC POSTOP RECOV ADDL HR: Mod: HCNC | Performed by: OTOLARYNGOLOGY

## 2019-04-25 PROCEDURE — 88305 TISSUE SPECIMEN TO PATHOLOGY - SURGERY: ICD-10-PCS | Mod: 26,HCNC,, | Performed by: PATHOLOGY

## 2019-04-25 PROCEDURE — 25000003 PHARM REV CODE 250: Mod: HCNC | Performed by: ANESTHESIOLOGY

## 2019-04-25 PROCEDURE — 88342 IMHCHEM/IMCYTCHM 1ST ANTB: CPT | Mod: 26,HCNC,, | Performed by: PATHOLOGY

## 2019-04-25 PROCEDURE — 88342 TISSUE SPECIMEN TO PATHOLOGY - SURGERY: ICD-10-PCS | Mod: 26,HCNC,, | Performed by: PATHOLOGY

## 2019-04-25 PROCEDURE — 88342 IMHCHEM/IMCYTCHM 1ST ANTB: CPT | Mod: HCNC | Performed by: PATHOLOGY

## 2019-04-25 PROCEDURE — 88305 TISSUE EXAM BY PATHOLOGIST: CPT | Mod: 26,HCNC,, | Performed by: PATHOLOGY

## 2019-04-25 PROCEDURE — 27000221 HC OXYGEN, UP TO 24 HOURS: Mod: HCNC

## 2019-04-25 PROCEDURE — 37000008 HC ANESTHESIA 1ST 15 MINUTES: Mod: HCNC | Performed by: OTOLARYNGOLOGY

## 2019-04-25 PROCEDURE — 31535 LARYNGOSCOPY W/BIOPSY: CPT | Mod: HCNC,,, | Performed by: OTOLARYNGOLOGY

## 2019-04-25 PROCEDURE — 37000009 HC ANESTHESIA EA ADD 15 MINS: Mod: HCNC | Performed by: OTOLARYNGOLOGY

## 2019-04-25 PROCEDURE — 25000003 PHARM REV CODE 250: Mod: HCNC | Performed by: SURGERY

## 2019-04-25 PROCEDURE — 36000708 HC OR TIME LEV III 1ST 15 MIN: Mod: HCNC | Performed by: OTOLARYNGOLOGY

## 2019-04-25 PROCEDURE — 94761 N-INVAS EAR/PLS OXIMETRY MLT: CPT | Mod: HCNC

## 2019-04-25 PROCEDURE — 71000015 HC POSTOP RECOV 1ST HR: Mod: HCNC | Performed by: OTOLARYNGOLOGY

## 2019-04-25 RX ORDER — OXYMETAZOLINE HCL 0.05 %
SPRAY, NON-AEROSOL (ML) NASAL
Status: DISCONTINUED | OUTPATIENT
Start: 2019-04-25 | End: 2019-04-25 | Stop reason: HOSPADM

## 2019-04-25 RX ORDER — LIDOCAINE HCL/PF 100 MG/5ML
SYRINGE (ML) INTRAVENOUS
Status: DISCONTINUED | OUTPATIENT
Start: 2019-04-25 | End: 2019-04-25

## 2019-04-25 RX ORDER — SODIUM CHLORIDE 0.9 % (FLUSH) 0.9 %
10 SYRINGE (ML) INJECTION
Status: DISCONTINUED | OUTPATIENT
Start: 2019-04-25 | End: 2019-04-25 | Stop reason: HOSPADM

## 2019-04-25 RX ORDER — LEVETIRACETAM 500 MG/1
1000 TABLET ORAL ONCE
Status: COMPLETED | OUTPATIENT
Start: 2019-04-25 | End: 2019-04-25

## 2019-04-25 RX ORDER — NEOSTIGMINE METHYLSULFATE 1 MG/ML
INJECTION, SOLUTION INTRAVENOUS
Status: DISCONTINUED | OUTPATIENT
Start: 2019-04-25 | End: 2019-04-25

## 2019-04-25 RX ORDER — FENTANYL CITRATE 50 UG/ML
INJECTION, SOLUTION INTRAMUSCULAR; INTRAVENOUS
Status: DISCONTINUED | OUTPATIENT
Start: 2019-04-25 | End: 2019-04-25

## 2019-04-25 RX ORDER — SODIUM CHLORIDE 9 MG/ML
INJECTION, SOLUTION INTRAVENOUS CONTINUOUS PRN
Status: DISCONTINUED | OUTPATIENT
Start: 2019-04-25 | End: 2019-04-25

## 2019-04-25 RX ORDER — ONDANSETRON 2 MG/ML
4 INJECTION INTRAMUSCULAR; INTRAVENOUS ONCE AS NEEDED
Status: DISCONTINUED | OUTPATIENT
Start: 2019-04-25 | End: 2019-04-25 | Stop reason: HOSPADM

## 2019-04-25 RX ORDER — PROPOFOL 10 MG/ML
VIAL (ML) INTRAVENOUS
Status: DISCONTINUED | OUTPATIENT
Start: 2019-04-25 | End: 2019-04-25

## 2019-04-25 RX ORDER — GLYCOPYRROLATE 0.2 MG/ML
INJECTION INTRAMUSCULAR; INTRAVENOUS
Status: DISCONTINUED | OUTPATIENT
Start: 2019-04-25 | End: 2019-04-25

## 2019-04-25 RX ORDER — ONDANSETRON 2 MG/ML
INJECTION INTRAMUSCULAR; INTRAVENOUS
Status: DISCONTINUED | OUTPATIENT
Start: 2019-04-25 | End: 2019-04-25

## 2019-04-25 RX ORDER — SUCCINYLCHOLINE CHLORIDE 20 MG/ML
INJECTION INTRAMUSCULAR; INTRAVENOUS
Status: DISCONTINUED | OUTPATIENT
Start: 2019-04-25 | End: 2019-04-25

## 2019-04-25 RX ORDER — LIDOCAINE HYDROCHLORIDE 10 MG/ML
1 INJECTION, SOLUTION EPIDURAL; INFILTRATION; INTRACAUDAL; PERINEURAL ONCE
Status: DISCONTINUED | OUTPATIENT
Start: 2019-04-25 | End: 2019-04-25 | Stop reason: HOSPADM

## 2019-04-25 RX ORDER — ROCURONIUM BROMIDE 10 MG/ML
INJECTION, SOLUTION INTRAVENOUS
Status: DISCONTINUED | OUTPATIENT
Start: 2019-04-25 | End: 2019-04-25

## 2019-04-25 RX ORDER — HYDROCODONE BITARTRATE AND ACETAMINOPHEN 5; 325 MG/1; MG/1
1 TABLET ORAL EVERY 6 HOURS PRN
Qty: 12 TABLET | Refills: 0 | Status: SHIPPED | OUTPATIENT
Start: 2019-04-25 | End: 2019-05-03

## 2019-04-25 RX ORDER — DEXAMETHASONE SODIUM PHOSPHATE 4 MG/ML
INJECTION, SOLUTION INTRA-ARTICULAR; INTRALESIONAL; INTRAMUSCULAR; INTRAVENOUS; SOFT TISSUE
Status: DISCONTINUED | OUTPATIENT
Start: 2019-04-25 | End: 2019-04-25

## 2019-04-25 RX ORDER — FENTANYL CITRATE 50 UG/ML
25 INJECTION, SOLUTION INTRAMUSCULAR; INTRAVENOUS EVERY 5 MIN PRN
Status: DISCONTINUED | OUTPATIENT
Start: 2019-04-25 | End: 2019-04-25 | Stop reason: HOSPADM

## 2019-04-25 RX ORDER — LABETALOL HYDROCHLORIDE 5 MG/ML
INJECTION, SOLUTION INTRAVENOUS
Status: DISCONTINUED | OUTPATIENT
Start: 2019-04-25 | End: 2019-04-25

## 2019-04-25 RX ADMIN — SUCCINYLCHOLINE CHLORIDE 100 MG: 20 INJECTION, SOLUTION INTRAMUSCULAR; INTRAVENOUS at 07:04

## 2019-04-25 RX ADMIN — ROCURONIUM BROMIDE 10 MG: 10 INJECTION, SOLUTION INTRAVENOUS at 07:04

## 2019-04-25 RX ADMIN — FENTANYL CITRATE 50 MCG: 50 INJECTION, SOLUTION INTRAMUSCULAR; INTRAVENOUS at 07:04

## 2019-04-25 RX ADMIN — DEXAMETHASONE SODIUM PHOSPHATE 8 MG: 4 INJECTION, SOLUTION INTRAMUSCULAR; INTRAVENOUS at 07:04

## 2019-04-25 RX ADMIN — NEOSTIGMINE METHYLSULFATE 2.5 MG: 1 INJECTION INTRAVENOUS at 07:04

## 2019-04-25 RX ADMIN — PROPOFOL 150 MG: 10 INJECTION, EMULSION INTRAVENOUS at 07:04

## 2019-04-25 RX ADMIN — ONDANSETRON 4 MG: 2 INJECTION INTRAMUSCULAR; INTRAVENOUS at 07:04

## 2019-04-25 RX ADMIN — SUGAMMADEX 200 MG: 100 INJECTION, SOLUTION INTRAVENOUS at 08:04

## 2019-04-25 RX ADMIN — LABETALOL HYDROCHLORIDE 5 MG: 5 INJECTION, SOLUTION INTRAVENOUS at 07:04

## 2019-04-25 RX ADMIN — LIDOCAINE HYDROCHLORIDE 50 MG: 20 INJECTION, SOLUTION INTRAVENOUS at 07:04

## 2019-04-25 RX ADMIN — SODIUM CHLORIDE: 0.9 INJECTION, SOLUTION INTRAVENOUS at 07:04

## 2019-04-25 RX ADMIN — ROCURONIUM BROMIDE 20 MG: 10 INJECTION, SOLUTION INTRAVENOUS at 07:04

## 2019-04-25 RX ADMIN — PROPOFOL 20 MG: 10 INJECTION, EMULSION INTRAVENOUS at 07:04

## 2019-04-25 RX ADMIN — GLYCOPYRROLATE 0.6 MG: 0.2 INJECTION, SOLUTION INTRAMUSCULAR; INTRAVENOUS at 07:04

## 2019-04-25 RX ADMIN — LEVETIRACETAM 1000 MG: 500 TABLET ORAL at 11:04

## 2019-04-25 NOTE — H&P
History & Physical    Chief Complaint   Patient presents with    Consult       suspected oropharynx cancer         HPI   63 y.o. female presents for evaluation of a L tonsil mass.  She reports a recent history of L throat and ear pain.  She reports that it is difficult to swallow due to this pain.  She was recently admitted to the hospital for ARF.  CT of the neck revealed a L oropharynx mass. She has never smoked.  She denies SOB.       Review of Systems   Constitutional: Negative for fatigue and unexpected weight change.   HENT: Per HPI.  Eyes: Negative for visual disturbance.   Respiratory: Negative for shortness of breath, hemoptysis   Cardiovascular: Negative for chest pain and palpitations.   Musculoskeletal: Negative for decreased ROM, back pain.   Skin: Negative for rash, sunburn, itching.   Neurological: Negative for dizziness and seizures.   Hematological: Negative for adenopathy. Does not bruise/bleed easily.   Endocrine: Negative for rapid weight loss/weight gain, heat/cold intolerance.      Past Medical History       Patient Active Problem List   Diagnosis    Secondary parkinsonism    Transient loss of consciousness    Sleep walking    Vertigo, constant    Headache    Falls    Seizure    Essential hypertension, benign    Complex regional pain syndrome type 2 of right upper extremity    Mood disorder with mixed features due to general medical condition    Anxiety    Cervical radiculopathy    Chronic pain    Cluster B personality disorder    Other symptoms and signs involving cognitive functions and awareness    Tremor, unspecified    Dementia    Alcohol dependence with alcohol-induced persisting dementia    Nasal septal deviation    Allergic rhinitis    Ear pain, left    Facial droop    Chronic prescription benzodiazepine use    Insomnia    Neck mass    DNR (do not resuscitate)    Anemia    Odynophagia               Past Surgical History         Past Surgical History:    Procedure Laterality Date    BLOCK STELLATE GANGLION Right 12/22/2017     [facility]    BLOCK-STELLATE GANGLION Right 8/1/2017     Per[facility] by [facility] at [facility]            Family History   History reviewed. No pertinent family history.           Social History   .  Social History               Socioeconomic History    Marital status:        Spouse name: Not on file    Number of children: Not on file    Years of education: Not on file    Highest education level: Not on file   Occupational History    Not on file   Social Needs    Financial resource strain: Not on file    Food insecurity:       Worry: Not on file       Inability: Not on file    Transportation needs:       Medical: Not on file       Non-medical: Not on file   Tobacco Use    Smoking status: Never Smoker    Smokeless tobacco: Never Used   Substance and Sexual Activity    Alcohol use: Yes       Alcohol/week: 1.0 oz       Types: 2 Standard drinks or equivalent per week    Drug use: No    Sexual activity: Not Currently   Lifestyle    Physical activity:       Days per week: Not on file       Minutes per session: Not on file    Stress: Not on file   Relationships    Social connections:       Talks on phone: Not on file       Gets together: Not on file       Attends Muslim service: Not on file       Active member of club or organization: Not on file       Attends meetings of clubs or organizations: Not on file       Relationship status: Not on file   Other Topics Concern    Patient feels they ought to cut down on drinking/drug use Not Asked    Patient annoyed by others criticizing their drinking/drug use Not Asked    Patient has felt bad or guilty about drinking/drug use Not Asked    Patient has had a drink/used drugs as an eye opener in the AM Not Asked   Social History Narrative     Now lives at [facility] in [facility], an assisted living.               Allergies    Review of patient's allergies indicates:  No Known Allergies           Physical Exam          Vitals:     04/16/19 1321   BP: (!) 179/96   Pulse: 79            Body mass index is 19.37 kg/m².        General: AOx3, NAD   Respiratory:  Symmetric chest rise, normal effort  Left Ear: External Auditory Canal WNL,TM w/o masses/lesions/perforations.  Middle ear without evidence of effusion.   Nose: No gross nasal septal deviation. Inferior Turbinates WNL bilaterally. No septal perforation. No masses/lesions.   Oral Cavity:  Oral Tongue mobile, no lesions noted. Hard Palate WNL. No buccal or FOM lesions.  Oropharynx:  No masses/lesions of the posterior pharyngeal wall. 2 cm ulcerated lesion L tonsil. Soft palate without masses. Midline uvula.   Neck: No scars.  No cervical lymphadenopathy, thyromegaly or thyroid nodules.  Normal range of motion.    Face: House Brackmann I bilaterally.      Declined scope exam.     CT neck reviewed.         Assessment/Plan       Problem List Items Addressed This Visit                 ENT      Tonsillar mass - Primary        Worrisome for malignancy.  I recommended that we proceed to the OR for DL and biopsy.     She understands that the risks include pain, bleeding, infection, dental injury, tongue numbness, hoarseness, dysphagia, recurrence of the lesion in question, perforation of the upper aerodigestive tract, need for additional treatment and airway fire. She wishes to proceed with surgery.           Interval H&P      No changes since last seen in clinic. Will proceed to OR for procedure as described above.      Michael Louis MD  Otolaryngology, PGY-III

## 2019-04-25 NOTE — PROGRESS NOTES
"Patient complained of a "seizure to her left leg" sensation that she has had in the past during her recovery in PACU, she described a cramping twisting type sensation to her left leg that was painful and uncontrolled. Pt no longer is experiencing this but dorsi/plantar flexion is extremely weak in bilateral lower extremities, Sensation intact. Pt said she feels like they are weak and probably recovering from the "seizure to her legs" She did not take her seizure medication today. I called Dr. Stone with Anesthesia and he came by to see the patient and speak with her.  "

## 2019-04-25 NOTE — PROGRESS NOTES
Dr. Khan with anesthesia came back by to check on and evaluate the patient after having Keppra, Patient states she feels okay to go home. MD cleared pt for discharge.

## 2019-04-25 NOTE — PROGRESS NOTES
Patient has no ride home, took a taxi here and would take a taxi home. Lives at assisted living, no support available. This was communicated to Dr. Louis and Dr. Santana

## 2019-04-25 NOTE — ANESTHESIA POSTPROCEDURE EVALUATION
Anesthesia Post Evaluation    Patient: Breonna Silva    Procedure(s) Performed: Procedure(s) (LRB):  LARYNGOSCOPY (N/A)    Final Anesthesia Type: general  Patient location during evaluation: PACU  Patient participation: Yes- Able to Participate  Level of consciousness: awake and alert and oriented  Post-procedure vital signs: reviewed and stable  Pain management: adequate  Airway patency: patent  PONV status at discharge: No PONV  Anesthetic complications: no      Cardiovascular status: blood pressure returned to baseline and hemodynamically stable  Respiratory status: unassisted  Hydration status: euvolemic  Follow-up not needed.          Vitals Value Taken Time   /68 4/25/2019 12:10 PM   Temp 37.3 °C (99.1 °F) 4/25/2019 12:10 PM   Pulse 95 4/25/2019 12:10 PM   Resp 16 4/25/2019 12:10 PM   SpO2 100 % 4/25/2019 12:10 PM   Vitals shown include unvalidated device data.      Event Time     Out of Recovery 10:33:41          Pain/Isabelle Score: Isabelle Score: 10 (4/25/2019 10:00 AM)

## 2019-04-25 NOTE — OP NOTE
DATE OF PROCEDURE: 4/25/2019     PREOPERATIVE DIAGNOSES:   Tonsillar mass [R22.0]    POSTOPERATIVE DIAGNOSES:   Tonsillar mass [R22.0]    SURGEON:  Surgeon(s) and Role:     * Dylan Pfeiffer MD - Primary     * Michael Louis MD - Resident - Assisting      PROCEDURES PERFORMED:   Direct laryngoscopy with use of operating telescope and biopsy of left soft palate mass    ANESTHESIA: General      INDICATIONS FOR PROCEDURE:   Breonna Silva is a 64 y.o.  woman recently referred to me for a left-sided oropharynx mass.  Based on her imaging in her clinical presentation, there was concern for malignancy.  On examination, she was noted to have an ulcerated lesion of the left soft palate. The remainder of the upper aerodigestive tract was unremarkable.  Given the above, it was recommended that we proceed to the operating room for the aforementioned procedures.    She was apprised of the risks, benefits and alternatives to surgery.  In spite of the risk inherent to surgery,she provided informed consent for the aforementioned procedures.     PROCEDURE IN DETAIL:  The patient was taken to the operating room and placed on the operating table in the supine position.  General endotracheal anesthesia was induced by the anesthesia team.     The operating table was rotated 90° to the right.  The upper teeth were protected with a mouth guard.  The Dedo laryngoscope was inserted through the mouth into the oropharynx.  The left soft palate was noted to be nearly completely involved with an ulcerated lesion which was likely originating in the left tonsil.  The remainder of the oropharynx, hypopharynx, and larynx was unremarkable.  Several representative biopsies were then taken of left soft palate lesion sent to pathology for permanent analysis.  After the biopsies had been taken, hemostasis was achieved with the application of topical Afrin.  Once hemostasis was achieved, the patient was handed back to anesthesia. She was  awakened, extubated and transferred to recovery in satisfactory condition.    There were no intraoperative complications.  I was present for and participated in the entire procedure as dictated above.       ESTIMATED BLOOD LOSS: Minimal    SPECIMENS:   Specimen (12h ago, onward)    Start     Ordered    04/25/19 0743  Specimen to Pathology - Surgery  Once     Comments:  Pre-op Diagnosis: Tonsillar mass [R22.0]Post-op Diagnosis: Tonsillar mass [R22.0]Procedure(s):LARYNGOSCOPY Number of specimens: 1Name of specimens: 1.) Left Soft Palate (do P-16 stain) - Permanent.     Start Status     04/25/19 0743 Collected (04/25/19 0745) Order ID: [id number]       04/25/19 0744

## 2019-04-25 NOTE — BRIEF OP NOTE
[facility]  Brief Operative Note     SUMMARY     Surgery Date: 4/25/2019     Surgeon(s) and Role:     * Dylan Pfeiffer MD - Primary     * Michael Louis MD - Resident - Assisting        Pre-op Diagnosis:  Tonsillar mass [R22.0]    Post-op Diagnosis:  Post-Op Diagnosis Codes:     * Tonsillar mass [R22.0]    Procedure(s) (LRB):  LARYNGOSCOPY (N/A)    Anesthesia: General    Description of the findings of the procedure: Direct laryngoscopy with left tonsillar and soft palate biopsies taken.    Findings/Key Components: See full op report    Estimated Blood Loss: Minimal         Specimens:   Specimen (12h ago, onward)    Start     Ordered    04/25/19 0743  Specimen to Pathology - Surgery  Once     Comments:  Pre-op Diagnosis: Tonsillar mass [R22.0]Post-op Diagnosis: Tonsillar mass [R22.0]Procedure(s):LARYNGOSCOPY Number of specimens: 1Name of specimens: 1.) Left Soft Palate (do P-16 stain) - Permanent.     Start Status     04/25/19 0743 Collected (04/25/19 0745) Order ID: [id number]       04/25/19 0744          Discharge Note    SUMMARY     Admit Date: 4/25/2019    Discharge Date and Time:  04/25/2019 8:19 AM    Hospital Course Following completion of an electively scheduled procedure, she was transferred to the PACU for postoperative monitoring. her hospital course was uneventful and noted for adequate pain control and PO intake following surgery. she is discharged home in good condition and will follow-up with Brenna Alatorre NP in 1 week.    Final Diagnosis: Post-Op Diagnosis Codes:     * Tonsillar mass [R22.0]    Disposition: Home or Self Care    Follow Up/Patient Instructions:     Medications:  Reconciled Home Medications:      Medication List      CHANGE how you take these medications    HYDROcodone-acetaminophen 5-325 mg per tablet  Commonly known as:  NORCO  Take 1 tablet by mouth every 6 (six) hours as needed for Pain. DO NOT TAKE WITH VALIUM - MAY CAUSE RESPIRATORY DEPRESSION, COMA,  OR DEATH  What changed:  additional instructions        CONTINUE taking these medications    b complex vitamins tablet  Take 1 tablet by mouth once daily.     diazePAM 2 MG tablet  Commonly known as:  VALIUM  TAKE 1 TABLET BY MOUTH 3 TIMES A DAY AS NEEDED FOR ANXIETY     folic acid 800 MCG Tab  Commonly known as:  FOLVITE  Take 800 mcg by mouth once daily.     gabapentin 300 MG capsule  Commonly known as:  NEURONTIN  TAKE 1 CAPSULE (300 MG TOTAL) BY MOUTH EVERY EVENING.     levETIRAcetam 1000 MG tablet  Commonly known as:  KEPPRA  TAKE 1 TABLET BY MOUTH TWICE A DAY     losartan 25 MG tablet  Commonly known as:  COZAAR  Take 1 tablet (25 mg total) by mouth once daily.     magnesium oxide 400 mg (241.3 mg magnesium) tablet  Commonly known as:  MAG-OX  Take 2 tablets (800 mg total) by mouth 2 (two) times daily. for 10 days     meclizine 25 mg tablet  Commonly known as:  ANTIVERT  TAKE 1 TABLET (25 MG TOTAL) BY MOUTH 3 (THREE) TIMES DAILY AS NEEDED FOR DIZZINESS.     melatonin 5 mg Tab  Take 1 tablet (5 mg total) by mouth nightly as needed.     potassium chloride SA 20 MEQ tablet  Commonly known as:  K-DUR,KLOR-CON  Take 1 tablet (20 mEq total) by mouth daily as needed (when taking furosemide for swelling).     VITAMIN B-12 1000 MCG tablet  Generic drug:  cyanocobalamin  Take 100 mcg by mouth once daily.        STOP taking these medications    neomycin-polymyxin-hydrocortisone otic solution  Commonly known as:  CORTISPORIN          Discharge Procedure Orders   Diet Adult Regular   Order Comments: Soft diet     No dressing needed     Notify your health care provider if you experience any of the following:  persistent nausea and vomiting or diarrhea     Notify your health care provider if you experience any of the following:  severe uncontrolled pain     Notify your health care provider if you experience any of the following:  redness, tenderness, or signs of infection (pain, swelling, redness, odor or green/yellow  discharge around incision site)     Notify your health care provider if you experience any of the following:  difficulty breathing or increased cough     Activity as tolerated     Follow-up Information     Carmencita Alatorer NP In 1 week.    Specialty:  Otolaryngology  Contact information:  [address]  [facility] [id number] [phone]

## 2019-04-25 NOTE — DISCHARGE INSTRUCTIONS
PATIENT INSTRUCTIONS  POST-ANESTHESIA    IMMEDIATELY FOLLOWING SURGERY:  Do not drive or operate machinery for the first twenty four hours after surgery.  Do not make any important decisions for twenty four hours after surgery or while taking narcotic pain medications or sedatives.  If you develop intractable nausea and vomiting or a severe headache please notify your doctor immediately.    FOLLOW-UP:  Please make an appointment with your surgeon as instructed. You do not need to follow up with anesthesia unless specifically instructed to do so.    WOUND CARE INSTRUCTIONS (if applicable):  Keep a dry clean dressing on the anesthesia/puncture wound site if there is drainage.  Once the wound has quit draining you may leave it open to air.  Generally you should leave the bandage intact for twenty four hours unless there is drainage.  If the epidural site drains for more than 36-48 hours please call the anesthesia department.    QUESTIONS?:  Please feel free to call your physician or the hospital  if you have any questions, and they will be happy to assist you.       [facility] Anesthesia Department  [address] [facility]  [phone]          Direct Laryngoscopy  Direct laryngoscopy is a procedure to look at the vocal cords. A laryngoscope is a rigid, hollow tube with a light attached. Using this tool, your healthcare provider can look behind your tongue and down your throat to your vocal cords. A tissue sample (biopsy) can be taken for study in a lab. Or a growth can be removed. Your healthcare provider can tell you more about your procedure depending on why its being done. This sheet gives you general information about what to expect.    Getting ready for the procedure  Prepare for the surgery as you have been instructed. Be sure to tell your healthcare provider about all medicines you take. This includes over-the-counter medicines. It also includes herbs and other supplements. You may need to  stop taking some or all of them before surgery. Your healthcare provider will let you know. Also follow any directions youre given for not eating or drinking before surgery.  The day of the procedure  The procedure takes 30 to 60 minutes. You will likely go home the same day.  Before the procedure  Here is what to expect before the procedure begins:  · An IV line is put into a vein in your arm or hand. This line delivers fluids and medicines.  · You will be given medicine (anesthesia) to keep you pain free during surgery. This may be general anesthesia, which puts you in a state like deep sleep. Or you may have sedation, which makes you relaxed and drowsy. Local anesthesia may also be injected or sprayed into your throat to numb it.  During the procedure  Here is what to expect during the procedure:  · You will lie on your back on a table. The scope is put into your mouth and passed down into the throat.  · Your healthcare provider examines the larynx and surrounding areas with the scope. If needed, a biopsy is done using small tools put through the scope.  · If a growth is found, tools can be put through the scope to remove it.  After the procedure  You will be taken to a room to wake up from the anesthesia. At first, your throat may be sore and scratchy. Your voice may be hoarse, making talking difficult. And it may be hard to swallow. You will receive medicine to control pain. When you are released to go home, have an adult family member or friend ready to drive you.  Recovering at home  Once home, follow any instructions you have been given. Be sure to:  · Take pain medicine as directed.  · Drink plenty of water as soon as you can swallow normally.  · Use throat lozenges as advised by your healthcare provider to help ease throat soreness.  · Rest your voice as instructed by your healthcare provider.  When to call your healthcare provider  After you get home, call the healthcare provider if you have any of the  following:  · Chest pain or trouble breathing (call 911)  · Fever of 100.4°F (38°C) or higher, or as directed by your healthcare provider  · Problems swallowing that prevent you from eating or drinking  · Pain that does not go away even after taking pain medicine  · Severe nausea or vomiting  · Bloody vomit  · Cough that brings up blood   Follow-up  Within a few weeks, you will see your healthcare provider for a follow-up visit. During this visit, your provider will discuss the results of the procedure. Depending on what was found, you may need further evaluation and treatment.  Risks and possible complications   The risks of this procedure include:  · Bleeding  · Infection  · Gagging  · Vomiting  · Cuts in the mouth or throat  · Injury to the teeth  · Vocal cord injury  · Breathing problems  · Risks of anesthesia   Date Last Reviewed: 6/11/2015  © 2931-2287 [employer]. [address] [place] [address]. All rights reserved. This information is not intended as a substitute for professional medical care. Always follow your healthcare professional's instructions.

## 2019-04-25 NOTE — PLAN OF CARE
Discharge instructions given and explained to patient with verbalization of understanding all instructions. Prescription given and explained next time and doses of each medication. Patients v/s stable, denies n/v and tolerating po, rates pain level tolerable, IV removed, and  patient discharge home.

## 2019-04-25 NOTE — TRANSFER OF CARE
"Anesthesia Transfer of Care Note    Patient: Breonna Silva    Procedure(s) Performed: Procedure(s) (LRB):  LARYNGOSCOPY (N/A)    Patient location: PACU    Transport from OR: Transported from OR on 6-10 L/min O2 by face mask with adequate spontaneous ventilation    Post pain: adequate analgesia    Post assessment: no apparent anesthetic complications    Post vital signs: stable    Level of consciousness: awake    Complications: none    Transfer of care protocol was followed      Last vitals:   Visit Vitals  BP (!) 196/99   Pulse 76   Temp 36.6 °C (97.9 °F) (Temporal)   Resp 16   Ht 5' 5" (1.651 m)   Wt 54.4 kg (120 lb)   SpO2 100%   Breastfeeding? No   BMI 19.97 kg/m²     "

## 2019-04-26 ENCOUNTER — TELEPHONE (OUTPATIENT)
Dept: OTOLARYNGOLOGY | Facility: CLINIC | Age: 64
End: 2019-04-26

## 2019-04-26 RX ORDER — MECLIZINE HYDROCHLORIDE 25 MG/1
25 TABLET ORAL 3 TIMES DAILY PRN
Qty: 30 TABLET | Refills: 0 | OUTPATIENT
Start: 2019-04-26

## 2019-04-26 RX ORDER — MECLIZINE HYDROCHLORIDE 25 MG/1
25 TABLET ORAL 3 TIMES DAILY PRN
Qty: 30 TABLET | Refills: 0 | Status: SHIPPED | OUTPATIENT
Start: 2019-04-26

## 2019-04-26 NOTE — TELEPHONE ENCOUNTER
----- Message from Shirlene Marroquin sent at 4/26/2019  9:05 AM CDT -----  Contact: pt   Can the clinic reply in [facility]:No       Please refill the medication(s) listed below. Please call the patient when the prescription(s) is ready for  at the phone number [phone]    Medication #1:meclizine (ANTIVERT) 25 mg tablet    Medication #2:      Preferred Pharmacy: [facility]/PHARMACY #[id number] - [address]

## 2019-04-26 NOTE — TELEPHONE ENCOUNTER
----- Message from Kia Martinez sent at 4/26/2019  3:24 PM CDT -----  Contact: patient   Please call above patient at [phone] has post op question for the nurse call her ASAP waiting on a call from the nurse thanks

## 2019-04-29 ENCOUNTER — TELEPHONE (OUTPATIENT)
Dept: OTOLARYNGOLOGY | Facility: CLINIC | Age: 64
End: 2019-04-29

## 2019-05-02 NOTE — PROGRESS NOTES
Chief Complaint   Patient presents with    Post-op Evaluation       HPI   64 y.o. female presents for a post op visit. She complains of pain to her mouth, throat, and ear. She has difficulty eating solids due to pain. She is taking Norco without relief of pain. She assures me she is not currently taking Valium.    She presented to Dr. Pfeiffer for evaluation of a L tonsil mass.  She reports a recent history of L throat and ear pain.  She reports that it is difficult to swallow due to this pain.  She was recently admitted to the hospital for ARF.  CT of the neck revealed a L oropharynx mass. She has never smoked.  She denies SOB.      Review of Systems   Constitutional: Negative for fatigue and unexpected weight change.   HENT: Per HPI.  Eyes: Negative for visual disturbance.   Respiratory: Negative for shortness of breath, hemoptysis   Cardiovascular: Negative for chest pain and palpitations.   Musculoskeletal: Negative for decreased ROM, back pain.   Skin: Negative for rash, sunburn, itching.   Neurological: Negative for dizziness and seizures.   Hematological: Negative for adenopathy. Does not bruise/bleed easily.   Endocrine: Negative for rapid weight loss/weight gain, heat/cold intolerance.     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    Anxiety    Cervical radiculopathy    Chronic pain    Cluster B personality disorder    Other symptoms and signs involving cognitive functions and awareness    Tremor, unspecified    Dementia    Alcohol dependence with alcohol-induced persisting dementia    Nasal septal deviation    Allergic rhinitis    Ear pain, left    Facial droop     Chronic prescription benzodiazepine use    Insomnia    Neck mass    DNR (do not resuscitate)    Anemia    Odynophagia    Tonsillar mass    Squamous cell carcinoma of soft palate           Past Surgical History   Past Surgical History:   Procedure Laterality Date    BLOCK STELLATE GANGLION Right 12/22/2017    [facility]    BLOCK-STELLATE GANGLION Right 8/1/2017    [facility] at [facility]    LARYNGOSCOPY N/A 4/25/2019    [facility]    IL DILATION/CURETTAGE,DIAGNOSTIC      D & C         Family History   History reviewed. No pertinent family history.        Social History   .  Social History     Socioeconomic History    Marital status:      Spouse name: Not on file    Number of children: Not on file    Years of education: Not on file    Highest education level: Not on file   Occupational History    Not on file   Social Needs    Financial resource strain: Not on file    Food insecurity:     Worry: Not on file     Inability: Not on file    Transportation needs:     Medical: Not on file     Non-medical: Not on file   Tobacco Use    Smoking status: Never Smoker    Smokeless tobacco: Never Used   Substance and Sexual Activity    Alcohol use: Yes     Alcohol/week: 1.0 oz     Types: 2 Standard drinks or equivalent per week    Drug use: No    Sexual activity: Not Currently   Lifestyle    Physical activity:     Days per week: Not on file     Minutes per session: Not on file    Stress: Not on file   Relationships    Social connections:     Talks on phone: Not on file     Gets together: Not on file     Attends Restorationist service: Not on file     Active member of club or organization: Not on file     Attends meetings of clubs or organizations: Not on file     Relationship status: Not on file   Other Topics Concern    Patient feels they ought to cut down on drinking/drug use Not Asked     Patient annoyed by others criticizing their drinking/drug use Not Asked    Patient has felt bad or guilty about drinking/drug use Not Asked    Patient has had a drink/used drugs as an eye opener in the AM Not Asked   Social History Narrative    [facility] lives at [facility], an assisted living.         Allergies   Review of patient's allergies indicates:  No Known Allergies        Physical Exam     There were no vitals filed for this visit.      There is no height or weight on file to calculate BMI.      General: AOx3, NAD   Respiratory:  Symmetric chest rise, normal effort  Left Ear: External Auditory Canal WNL,TM w/o masses/lesions/perforations.  Middle ear without evidence of effusion.   Nose: No gross nasal septal deviation. Inferior Turbinates WNL bilaterally. No septal perforation. No masses/lesions.   Oral Cavity:  Oral Tongue mobile, no lesions noted. Hard Palate WNL. No buccal or FOM lesions.  Oropharynx:  No masses/lesions of the posterior pharyngeal wall. 2 cm ulcerated lesion L tonsil. Soft palate without masses. Midline uvula.   Neck: No scars.  No cervical lymphadenopathy, thyromegaly or thyroid nodules.  Normal range of motion.    Face: House Brackmann I bilaterally.       FINAL PATHOLOGIC DIAGNOSIS  Left soft palate, biopsy:  - Invasive, moderately differentiated squamous cell carcinoma  - An IHC with appropriate controls for p16 is examined, focally positive (<1%).    CT neck reviewed.       Assessment/Plan  Problem List Items Addressed This Visit        Oncology    Squamous cell carcinoma of soft palate - Primary     Breonna Coudayo has newly diagnosed SCC of the soft palate. We discussed her diagnosis. I have ordered a PET to complete staging. Referrals placed for rad onc and med onc. I have increased her pain medication. We discussed not taking Valium with the pain medication. She states she is no longer taking the Valium and she will not take it with pain medication. I also encouraged  her to increase her PO intake. Questions answered.         Relevant Medications    HYDROcodone-acetaminophen (NORCO)  mg per tablet    Other Relevant Orders    NM PET CT Routine Skull to Mid Thigh    Ambulatory Referral to Hematology/Oncology/Psychology    Ambulatory Referral to Hematology/Oncology/Nutrition

## 2019-05-03 ENCOUNTER — OFFICE VISIT (OUTPATIENT)
Dept: OTOLARYNGOLOGY | Facility: CLINIC | Age: 64
End: 2019-05-03
Payer: MEDICARE

## 2019-05-03 DIAGNOSIS — C05.1 SQUAMOUS CELL CARCINOMA OF SOFT PALATE: Primary | ICD-10-CM

## 2019-05-03 PROCEDURE — 99213 PR OFFICE/OUTPT VISIT, EST, LEVL III, 20-29 MIN: ICD-10-PCS | Mod: HCNC,S$GLB,, | Performed by: NURSE PRACTITIONER

## 2019-05-03 PROCEDURE — 99213 OFFICE O/P EST LOW 20 MIN: CPT | Mod: HCNC,S$GLB,, | Performed by: NURSE PRACTITIONER

## 2019-05-03 PROCEDURE — 99999 PR PBB SHADOW E&M-EST. PATIENT-LVL III: ICD-10-PCS | Mod: PBBFAC,HCNC,, | Performed by: NURSE PRACTITIONER

## 2019-05-03 PROCEDURE — 99999 PR PBB SHADOW E&M-EST. PATIENT-LVL III: CPT | Mod: PBBFAC,HCNC,, | Performed by: NURSE PRACTITIONER

## 2019-05-03 RX ORDER — HYDROCODONE BITARTRATE AND ACETAMINOPHEN 10; 325 MG/1; MG/1
1 TABLET ORAL EVERY 4 HOURS PRN
Qty: 30 TABLET | Refills: 0 | Status: ON HOLD | OUTPATIENT
Start: 2019-05-03 | End: 2019-05-29 | Stop reason: HOSPADM

## 2019-05-03 NOTE — ASSESSMENT & PLAN NOTE
Breonna Silva has newly diagnosed SCC of the soft palate. We discussed her diagnosis. I have ordered a PET to complete staging. Referrals placed for rad onc and med onc. I have increased her pain medication. We discussed not taking Valium with the pain medication. She states she is no longer taking the Valium and she will not take it with pain medication. I also encouraged her to increase her PO intake. Questions answered.

## 2019-05-10 ENCOUNTER — TELEPHONE (OUTPATIENT)
Dept: INFUSION THERAPY | Facility: HOSPITAL | Age: 64
End: 2019-05-10

## 2019-05-10 ENCOUNTER — TELEPHONE (OUTPATIENT)
Dept: HEMATOLOGY/ONCOLOGY | Facility: CLINIC | Age: 64
End: 2019-05-10

## 2019-05-13 ENCOUNTER — HOSPITAL ENCOUNTER (OUTPATIENT)
Dept: RADIOLOGY | Facility: HOSPITAL | Age: 64
Discharge: HOME | End: 2019-05-13
Attending: NURSE PRACTITIONER
Payer: MEDICARE

## 2019-05-13 DIAGNOSIS — C05.1 SQUAMOUS CELL CARCINOMA OF SOFT PALATE: ICD-10-CM

## 2019-05-13 LAB — POCT GLUCOSE: 112 MG/DL (ref 70–110)

## 2019-05-13 PROCEDURE — 78815 PET IMAGE W/CT SKULL-THIGH: CPT | Mod: TC,HCNC

## 2019-05-13 PROCEDURE — 78815 PET IMAGE W/CT SKULL-THIGH: CPT | Mod: 26,PI,HCNC, | Performed by: RADIOLOGY

## 2019-05-13 PROCEDURE — 78815 NM PET CT ROUTINE: ICD-10-PCS | Mod: 26,PI,HCNC, | Performed by: RADIOLOGY

## 2019-05-13 PROCEDURE — A9552 F18 FDG: HCPCS | Mod: HCNC

## 2019-05-14 DIAGNOSIS — C05.1 SQUAMOUS CELL CARCINOMA OF SOFT PALATE: Primary | ICD-10-CM

## 2019-05-14 RX ORDER — OXYCODONE AND ACETAMINOPHEN 10; 325 MG/1; MG/1
1 TABLET ORAL EVERY 4 HOURS PRN
Qty: 30 TABLET | Refills: 0 | Status: ON HOLD | OUTPATIENT
Start: 2019-05-14 | End: 2019-05-29 | Stop reason: HOSPADM

## 2019-05-15 ENCOUNTER — TELEPHONE (OUTPATIENT)
Dept: INFUSION THERAPY | Facility: HOSPITAL | Age: 64
End: 2019-05-15

## 2019-05-20 ENCOUNTER — TELEPHONE (OUTPATIENT)
Dept: HEMATOLOGY/ONCOLOGY | Facility: CLINIC | Age: 64
End: 2019-05-20

## 2019-05-20 ENCOUNTER — OFFICE VISIT (OUTPATIENT)
Dept: OTOLARYNGOLOGY | Facility: CLINIC | Age: 64
End: 2019-05-20
Payer: MEDICARE

## 2019-05-20 VITALS
SYSTOLIC BLOOD PRESSURE: 101 MMHG | DIASTOLIC BLOOD PRESSURE: 69 MMHG | WEIGHT: 115.5 LBS | HEART RATE: 112 BPM | BODY MASS INDEX: 19.22 KG/M2

## 2019-05-20 DIAGNOSIS — C05.1 SQUAMOUS CELL CARCINOMA OF SOFT PALATE: Primary | ICD-10-CM

## 2019-05-20 PROCEDURE — 99024 POSTOP FOLLOW-UP VISIT: CPT | Mod: HCNC,S$GLB,, | Performed by: NURSE PRACTITIONER

## 2019-05-20 PROCEDURE — 99024 PR POST-OP FOLLOW-UP VISIT: ICD-10-PCS | Mod: HCNC,S$GLB,, | Performed by: NURSE PRACTITIONER

## 2019-05-20 PROCEDURE — 99999 PR PBB SHADOW E&M-EST. PATIENT-LVL III: CPT | Mod: PBBFAC,HCNC,, | Performed by: NURSE PRACTITIONER

## 2019-05-20 PROCEDURE — 99999 PR PBB SHADOW E&M-EST. PATIENT-LVL III: ICD-10-PCS | Mod: PBBFAC,HCNC,, | Performed by: NURSE PRACTITIONER

## 2019-05-20 NOTE — PROGRESS NOTES
"Chief Complaint   Patient presents with    Post-op Evaluation       HPI   64 y.o. female presents for a follow up visit. She complains of pain to her mouth, throat, and ear. She has difficulty eating solids due to pain. She is taking in small amounts of fluids and soft foods such as ice cream. She has lost 5lb over the past few weeks. She is taking Percocet without relief of pain. She states the pain is "unbearable." She assures me she is not currently taking Valium.    She presented to Dr. Pfeiffer for evaluation of a L tonsil mass.  She reports a recent history of L throat and ear pain.  She reports that it is difficult to swallow due to this pain.  She was recently admitted to the hospital for ARF.  CT of the neck revealed a L oropharynx mass. She has never smoked.  She denies SOB.      Review of Systems   Constitutional: Negative for fatigue and unexpected weight change.   HENT: Per HPI.  Eyes: Negative for visual disturbance.   Respiratory: Negative for shortness of breath, hemoptysis   Cardiovascular: Negative for chest pain and palpitations.   Musculoskeletal: Negative for decreased ROM, back pain.   Skin: Negative for rash, sunburn, itching.   Neurological: Negative for dizziness and seizures.   Hematological: Negative for adenopathy. Does not bruise/bleed easily.   Endocrine: Negative for rapid weight loss/weight gain, heat/cold intolerance.     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    Anxiety    Cervical radiculopathy    Chronic pain    Cluster B personality disorder    Other symptoms and signs involving cognitive functions and awareness    Tremor, unspecified    " Dementia    Alcohol dependence with alcohol-induced persisting dementia    Nasal septal deviation    Allergic rhinitis    Ear pain, left    Facial droop    Chronic prescription benzodiazepine use    Insomnia    Neck mass    DNR (do not resuscitate)    Anemia    Odynophagia    Tonsillar mass    Squamous cell carcinoma of soft palate           Past Surgical History   Past Surgical History:   Procedure Laterality Date    BLOCK STELLATE GANGLION Right 12/22/2017    [facility]    BLOCK-STELLATE GANGLION Right 8/1/2017    [facility]    LARYNGOSCOPY N/A 4/25/2019    [facility]    [place] DILATION/CURETTAGE,DIAGNOSTIC      D & C         Family History   No family history on file.        Social History   .  Social History     Socioeconomic History    Marital status:      Spouse name: Not on file    Number of children: Not on file    Years of education: Not on file    Highest education level: Not on file   Occupational History    Not on file   Social Needs    Financial resource strain: Not on file    Food insecurity:     Worry: Not on file     Inability: Not on file    Transportation needs:     Medical: Not on file     Non-medical: Not on file   Tobacco Use    Smoking status: Never Smoker    Smokeless tobacco: Never Used   Substance and Sexual Activity    Alcohol use: Yes     Alcohol/week: 1.0 oz     Types: 2 Standard drinks or equivalent per week    Drug use: No    Sexual activity: Not Currently   Lifestyle    Physical activity:     Days per week: Not on file     Minutes per session: Not on file    Stress: Not on file   Relationships    Social connections:     Talks on phone: Not on file     Gets together: Not on file     Attends Holiness service: Not on file     Active member of club or organization: Not on file     Attends meetings of clubs or organizations: Not  on file     Relationship status: Not on file   Other Topics Concern    Patient feels they ought to cut down on drinking/drug use Not Asked    Patient annoyed by others criticizing their drinking/drug use Not Asked    Patient has felt bad or guilty about drinking/drug use Not Asked    Patient has had a drink/used drugs as an eye opener in the AM Not Asked   Social History Narrative    Now lives at [facility] in [facility], an assisted living.         Allergies   Review of patient's allergies indicates:  No Known Allergies        Physical Exam     Vitals:    05/20/19 1126   BP: 101/69   Pulse: (!) 112         Body mass index is 19.22 kg/m².      General: AOx3, she is visibly uncomfortable and is holding her left ear and jaw   Respiratory:  Symmetric chest rise, normal effort  Oropharynx:  Exam is limited due to patient discomfort  Neck: No scars.  No cervical lymphadenopathy, thyromegaly or thyroid nodules.  Normal range of motion.    Face: House Brackmann I bilaterally.       FINAL PATHOLOGIC DIAGNOSIS  Left soft palate, biopsy:  - Invasive, moderately differentiated squamous cell carcinoma  - An IHC with appropriate controls for p16 is examined, focally positive (<1%).    CT neck reviewed.     PET, Impression       Hypermetabolic left oropharyngeal lesion with multiple hypermetabolic cervical lymph nodes, more prominent on the left as above.    Mild focal radiotracer uptake identified in the left hepatic lobe and left iliac which are concerning for metastatic disease.  Recommend further evaluation with MRI abdomen/pelvis with contrast.         Assessment/Plan  Problem List Items Addressed This Visit        Oncology    Squamous cell carcinoma of soft palate - Primary     Breonna Coudayo has SCC of the soft palate. We discussed her diagnosis. Her symptoms are progressively worsening and she is losing weight. We discussed that she should be admitted to the hospital for pain control and fluids. We also discussed that  she may need a PEG for nutrition as she is currently unable to eat an adequate amount of calories and fluid. She is agreeable to being admitted, but states she must go home to take care of her cat. She does not want to call anyone at her group home for assistance. She also refuses to call, or allow us to call her emergency contact. I offered to arrange transportation, but she states she would rather arrange it herself. She is instant on going home to take care of her affairs and promises to return to clinic later today for admission. We discussed that if she is unable to come during office hours, then she should go to the ER instead. She states she will let us know her plan. She wishes to cancel her oncology appointments for today as she currently does not wish to treat her cancer. Will continue to follow.    Dr. Pfeiffer was present for the majority of this visit.

## 2019-05-20 NOTE — TELEPHONE ENCOUNTER
----- Message from Shonda Esparza sent at 5/20/2019  6:48 AM CDT -----  Contact: Pt  Pt says she need to r/s her appt says she is not able to move and need more time says she is very scared and need to speak with someone from the dept    Pt can be reached at [phone]

## 2019-05-20 NOTE — TELEPHONE ENCOUNTER
"Spoke with patient.  She is calling to reschedule her appointments today, as she is having 10+/10 pain. Constant throat pain which radiates into her left ear. She is taking percocet  every 4 hours, otis 300mg qhs, and ibuprofen 200mg (2 tablets daily). She states she lives alone and has no one to help her. She is having difficulty eating/drinking. She does not want to call 911 and does not have anyone who can bring her to clinic today--she states she has never felt this badly in her life. Nurse asked her to elaborate---she states it's the pain and the sadness this is not going to go away. Nurse encouraged patient to keep appointments today---nurse offered to try and get her transportation to appointments. She states, "I just want to lay in bed."  Nurse informed patient she would forward message to dr butler's office---to see what they suggest. Nurse would leave appointments today as is for now---  Patient stated, "no, please cancel them."    Nurse decided to leave appointments scheduled for now until speaking with dr butler's office.    Message routed to john burr and dr butler's staff   "

## 2019-05-21 ENCOUNTER — TELEPHONE (OUTPATIENT)
Dept: OTOLARYNGOLOGY | Facility: CLINIC | Age: 64
End: 2019-05-21

## 2019-05-21 NOTE — ASSESSMENT & PLAN NOTE
Breonna Silva has SCC of the soft palate. We discussed her diagnosis. Her symptoms are progressively worsening and she is losing weight. We discussed that she should be admitted to the hospital for pain control and fluids. We also discussed that she may need a PEG for nutrition as she is currently unable to eat an adequate amount of calories and fluid. She is agreeable to being admitted, but states she must go home to take care of her cat. She does not want to call anyone at her group home for assistance. She also refuses to call, or allow us to call her emergency contact. I offered to arrange transportation, but she states she would rather arrange it herself. She is instant on going home to take care of her affairs and promises to return to clinic later today for admission. We discussed that if she is unable to come during office hours, then she should go to the ER instead. She states she will let us know her plan. She wishes to cancel her oncology appointments for today as she currently does not wish to treat her cancer. Will continue to follow.    Dr. Pfeiffer was present for the majority of this visit.

## 2019-05-21 NOTE — TELEPHONE ENCOUNTER
Called Ms. Silva to check in on her per providers request. Pt was advised to arrive to ED to be admitted in to the hospital for malnutrition. Pt states she lost her wallet and would like to get her affairs in order before arriving. She plans on doing so tomorrow.Informed her that she can arrive to ED or call clinic and arrive to clinic to be directly admitted. Pt verbalized understanding.

## 2019-05-26 ENCOUNTER — HOSPITAL ENCOUNTER (INPATIENT)
Facility: HOSPITAL | Age: 64
LOS: 3 days | Discharge: HOME | DRG: 948 | End: 2019-05-29
Attending: EMERGENCY MEDICINE | Admitting: OTOLARYNGOLOGY
Payer: MEDICARE

## 2019-05-26 DIAGNOSIS — R13.10 ODYNOPHAGIA: ICD-10-CM

## 2019-05-26 DIAGNOSIS — R13.10 DYSPHAGIA, UNSPECIFIED TYPE: Primary | ICD-10-CM

## 2019-05-26 DIAGNOSIS — F54 PSYCHOLOGICAL FACTORS AFFECTING MEDICAL CONDITION: ICD-10-CM

## 2019-05-26 DIAGNOSIS — Z51.5 PALLIATIVE CARE ENCOUNTER: ICD-10-CM

## 2019-05-26 DIAGNOSIS — C05.1 SQUAMOUS CELL CARCINOMA OF SOFT PALATE: ICD-10-CM

## 2019-05-26 DIAGNOSIS — F03.90 DEMENTIA WITHOUT BEHAVIORAL DISTURBANCE, UNSPECIFIED DEMENTIA TYPE: ICD-10-CM

## 2019-05-26 LAB
ALBUMIN SERPL BCP-MCNC: 3.8 G/DL (ref 3.5–5.2)
ALP SERPL-CCNC: 77 U/L (ref 55–135)
ALT SERPL W/O P-5'-P-CCNC: 6 U/L (ref 10–44)
ANION GAP SERPL CALC-SCNC: 13 MMOL/L (ref 8–16)
AST SERPL-CCNC: 12 U/L (ref 10–40)
BASOPHILS # BLD AUTO: 0.03 K/UL (ref 0–0.2)
BASOPHILS NFR BLD: 0.3 % (ref 0–1.9)
BILIRUB SERPL-MCNC: 0.5 MG/DL (ref 0.1–1)
BUN SERPL-MCNC: 31 MG/DL (ref 8–23)
CALCIUM SERPL-MCNC: 10.8 MG/DL (ref 8.7–10.5)
CHLORIDE SERPL-SCNC: 107 MMOL/L (ref 95–110)
CO2 SERPL-SCNC: 16 MMOL/L (ref 23–29)
CREAT SERPL-MCNC: 1.1 MG/DL (ref 0.5–1.4)
DIFFERENTIAL METHOD: ABNORMAL
EOSINOPHIL # BLD AUTO: 0.1 K/UL (ref 0–0.5)
EOSINOPHIL NFR BLD: 1.4 % (ref 0–8)
ERYTHROCYTE [DISTWIDTH] IN BLOOD BY AUTOMATED COUNT: 11.6 % (ref 11.5–14.5)
EST. GFR  (AFRICAN AMERICAN): >60 ML/MIN/1.73 M^2
EST. GFR  (NON AFRICAN AMERICAN): 53.2 ML/MIN/1.73 M^2
GLUCOSE SERPL-MCNC: 109 MG/DL (ref 70–110)
HCT VFR BLD AUTO: 37.1 % (ref 37–48.5)
HGB BLD-MCNC: 12.2 G/DL (ref 12–16)
IMM GRANULOCYTES # BLD AUTO: 0.02 K/UL (ref 0–0.04)
IMM GRANULOCYTES NFR BLD AUTO: 0.2 % (ref 0–0.5)
LYMPHOCYTES # BLD AUTO: 0.9 K/UL (ref 1–4.8)
LYMPHOCYTES NFR BLD: 9.7 % (ref 18–48)
MAGNESIUM SERPL-MCNC: 1.5 MG/DL (ref 1.6–2.6)
MCH RBC QN AUTO: 34.2 PG (ref 27–31)
MCHC RBC AUTO-ENTMCNC: 32.9 G/DL (ref 32–36)
MCV RBC AUTO: 104 FL (ref 82–98)
MONOCYTES # BLD AUTO: 0.4 K/UL (ref 0.3–1)
MONOCYTES NFR BLD: 4.6 % (ref 4–15)
NEUTROPHILS # BLD AUTO: 7.7 K/UL (ref 1.8–7.7)
NEUTROPHILS NFR BLD: 83.8 % (ref 38–73)
NRBC BLD-RTO: 0 /100 WBC
PLATELET # BLD AUTO: 240 K/UL (ref 150–350)
PMV BLD AUTO: 11.7 FL (ref 9.2–12.9)
POTASSIUM SERPL-SCNC: 5.7 MMOL/L (ref 3.5–5.1)
PROT SERPL-MCNC: 8.1 G/DL (ref 6–8.4)
RBC # BLD AUTO: 3.57 M/UL (ref 4–5.4)
SODIUM SERPL-SCNC: 136 MMOL/L (ref 136–145)
WBC # BLD AUTO: 9.22 K/UL (ref 3.9–12.7)

## 2019-05-26 PROCEDURE — 25000003 PHARM REV CODE 250: Mod: HCNC | Performed by: OTOLARYNGOLOGY

## 2019-05-26 PROCEDURE — 83735 ASSAY OF MAGNESIUM: CPT | Mod: HCNC

## 2019-05-26 PROCEDURE — 99285 EMERGENCY DEPT VISIT HI MDM: CPT | Mod: HCNC,,, | Performed by: EMERGENCY MEDICINE

## 2019-05-26 PROCEDURE — 99285 PR EMERGENCY DEPT VISIT,LEVEL V: ICD-10-PCS | Mod: HCNC,,, | Performed by: EMERGENCY MEDICINE

## 2019-05-26 PROCEDURE — 99285 EMERGENCY DEPT VISIT HI MDM: CPT | Mod: 25,HCNC

## 2019-05-26 PROCEDURE — 80053 COMPREHEN METABOLIC PANEL: CPT | Mod: HCNC

## 2019-05-26 PROCEDURE — 96375 TX/PRO/DX INJ NEW DRUG ADDON: CPT | Mod: HCNC

## 2019-05-26 PROCEDURE — 85025 COMPLETE CBC W/AUTO DIFF WBC: CPT | Mod: HCNC

## 2019-05-26 PROCEDURE — 63600175 PHARM REV CODE 636 W HCPCS: Mod: HCNC | Performed by: PHYSICIAN ASSISTANT

## 2019-05-26 PROCEDURE — 25000003 PHARM REV CODE 250: Mod: HCNC | Performed by: PHYSICIAN ASSISTANT

## 2019-05-26 PROCEDURE — 96361 HYDRATE IV INFUSION ADD-ON: CPT | Mod: HCNC

## 2019-05-26 PROCEDURE — 20600001 HC STEP DOWN PRIVATE ROOM: Mod: HCNC

## 2019-05-26 PROCEDURE — 96374 THER/PROPH/DIAG INJ IV PUSH: CPT | Mod: HCNC

## 2019-05-26 RX ORDER — MORPHINE SULFATE 2 MG/ML
6 INJECTION, SOLUTION INTRAMUSCULAR; INTRAVENOUS
Status: COMPLETED | OUTPATIENT
Start: 2019-05-26 | End: 2019-05-26

## 2019-05-26 RX ORDER — BENZONATATE 100 MG/1
100 CAPSULE ORAL 3 TIMES DAILY PRN
Status: DISCONTINUED | OUTPATIENT
Start: 2019-05-26 | End: 2019-05-29 | Stop reason: HOSPADM

## 2019-05-26 RX ORDER — DEXTROSE MONOHYDRATE, SODIUM CHLORIDE, AND POTASSIUM CHLORIDE 50; 1.49; 4.5 G/1000ML; G/1000ML; G/1000ML
INJECTION, SOLUTION INTRAVENOUS CONTINUOUS
Status: DISCONTINUED | OUTPATIENT
Start: 2019-05-26 | End: 2019-05-27

## 2019-05-26 RX ORDER — LEVETIRACETAM 500 MG/1
1000 TABLET ORAL 2 TIMES DAILY
Status: DISCONTINUED | OUTPATIENT
Start: 2019-05-26 | End: 2019-05-29 | Stop reason: HOSPADM

## 2019-05-26 RX ORDER — HYDROCODONE BITARTRATE AND ACETAMINOPHEN 7.5; 325 MG/15ML; MG/15ML
15 SOLUTION ORAL EVERY 4 HOURS PRN
Status: DISCONTINUED | OUTPATIENT
Start: 2019-05-26 | End: 2019-05-27

## 2019-05-26 RX ORDER — LOSARTAN POTASSIUM 25 MG/1
25 TABLET ORAL DAILY
Status: DISCONTINUED | OUTPATIENT
Start: 2019-05-27 | End: 2019-05-27

## 2019-05-26 RX ORDER — DIAZEPAM 2 MG/1
2 TABLET ORAL EVERY 12 HOURS PRN
Status: DISCONTINUED | OUTPATIENT
Start: 2019-05-26 | End: 2019-05-29 | Stop reason: HOSPADM

## 2019-05-26 RX ORDER — MORPHINE SULFATE 2 MG/ML
2 INJECTION, SOLUTION INTRAMUSCULAR; INTRAVENOUS
Status: DISCONTINUED | OUTPATIENT
Start: 2019-05-26 | End: 2019-05-29 | Stop reason: HOSPADM

## 2019-05-26 RX ORDER — RAMELTEON 8 MG/1
8 TABLET ORAL NIGHTLY PRN
Status: DISCONTINUED | OUTPATIENT
Start: 2019-05-26 | End: 2019-05-29 | Stop reason: HOSPADM

## 2019-05-26 RX ORDER — ONDANSETRON 2 MG/ML
4 INJECTION INTRAMUSCULAR; INTRAVENOUS EVERY 8 HOURS PRN
Status: DISCONTINUED | OUTPATIENT
Start: 2019-05-26 | End: 2019-05-29 | Stop reason: HOSPADM

## 2019-05-26 RX ORDER — ONDANSETRON 2 MG/ML
8 INJECTION INTRAMUSCULAR; INTRAVENOUS
Status: COMPLETED | OUTPATIENT
Start: 2019-05-26 | End: 2019-05-26

## 2019-05-26 RX ADMIN — SODIUM CHLORIDE 1000 ML: 0.9 INJECTION, SOLUTION INTRAVENOUS at 07:05

## 2019-05-26 RX ADMIN — DEXTROSE, SODIUM CHLORIDE, AND POTASSIUM CHLORIDE: 5; .45; .15 INJECTION INTRAVENOUS at 09:05

## 2019-05-26 RX ADMIN — HYDROCODONE BITARTRATE AND ACETAMINOPHEN 15 ML: 7.5; 325 SOLUTION ORAL at 09:05

## 2019-05-26 RX ADMIN — ONDANSETRON 8 MG: 2 INJECTION INTRAMUSCULAR; INTRAVENOUS at 07:05

## 2019-05-26 RX ADMIN — MORPHINE SULFATE 6 MG: 2 INJECTION, SOLUTION INTRAMUSCULAR; INTRAVENOUS at 07:05

## 2019-05-26 RX ADMIN — BENZONATATE 100 MG: 100 CAPSULE ORAL at 08:05

## 2019-05-26 RX ADMIN — LEVETIRACETAM 1000 MG: 500 TABLET ORAL at 08:05

## 2019-05-26 NOTE — ED TRIAGE NOTES
Breonna Silva, a 64 y.o. female presents to the ED     Triage note:  Chief Complaint   Patient presents with    Cancer     patient states she was supposed to have appointment with oncologist on 5/24/2019 but missed appointment  - patient has newly diagnosed throat cancer and states she is not able to eat and the pain is severe to her neck       Pt states she is not taking in food or drink due to pain in her mouth and throat. She c/o nausea and headache. She states she can only tolerate hot liquids. She states she has been having a productive cough with white sputum. She states she was just recently diagnosed with mouth cancer and sees oncology. No chemo or radiation started yet.      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    Therapy     Transient loss of consciousness 9/28/2012    presumed seizures    Vertigo        LOC: The patient is awake, alert, aware of environment with an appropriate affect. Oriented x3, speaking appropriately  APPEARANCE: Pt is having pain, grimace and tears occasionally, in no acute distress, pt is clean and well groomed, clothing properly fastened  SKIN: Skin warm, dry and intact, normal skin turgor, moist mucus membranes  ENT: Pt c/o palate and throat pain, redness noted to posterior palate; unable to visualize throat due to patient cooperation  RESPIRATORY: Airway is open and patent, respirations are spontaneous, even and unlabored, normal effort and rate. Pt c/o productive cough with white sputum.   CARDIAC: Normal rate and rhythm, no peripheral edema noted, capillary refill < 3 seconds, bilateral radial pulses 2+  ABDOMEN: Soft, nontender, nondistended. Bowel sounds present. Pt c/o  nausea.  NEUROLOGIC: PERRL, facial expression is symmetrical, patient moving all extremities spontaneously, normal sensation in all extremities when touched with a finger.  Follows all commands appropriately  MUSCULOSKELETAL: No obvious deformities.

## 2019-05-26 NOTE — ED TRIAGE NOTES
patient states she was supposed to have appointment with oncologist on 5/24/2019 but missed appointment  - patient has newly diagnosed throat cancer and states she is not able to eat and the pain is severe to her neck - patient very anxious

## 2019-05-27 PROBLEM — F54 PSYCHOLOGICAL FACTORS AFFECTING MEDICAL CONDITION: Status: ACTIVE | Noted: 2019-05-27

## 2019-05-27 PROBLEM — Z51.5 PALLIATIVE CARE ENCOUNTER: Status: ACTIVE | Noted: 2019-05-27

## 2019-05-27 LAB
ANION GAP SERPL CALC-SCNC: 7 MMOL/L (ref 8–16)
BASOPHILS # BLD AUTO: 0.02 K/UL (ref 0–0.2)
BASOPHILS NFR BLD: 0.3 % (ref 0–1.9)
BILIRUB UR QL STRIP: NEGATIVE
BUN SERPL-MCNC: 27 MG/DL (ref 8–23)
CALCIUM SERPL-MCNC: 9.1 MG/DL (ref 8.7–10.5)
CHLORIDE SERPL-SCNC: 111 MMOL/L (ref 95–110)
CLARITY UR REFRACT.AUTO: ABNORMAL
CO2 SERPL-SCNC: 17 MMOL/L (ref 23–29)
COLOR UR AUTO: YELLOW
CREAT SERPL-MCNC: 1 MG/DL (ref 0.5–1.4)
DIFFERENTIAL METHOD: ABNORMAL
EOSINOPHIL # BLD AUTO: 0.2 K/UL (ref 0–0.5)
EOSINOPHIL NFR BLD: 2.6 % (ref 0–8)
ERYTHROCYTE [DISTWIDTH] IN BLOOD BY AUTOMATED COUNT: 11.8 % (ref 11.5–14.5)
EST. GFR  (AFRICAN AMERICAN): >60 ML/MIN/1.73 M^2
EST. GFR  (NON AFRICAN AMERICAN): 59.7 ML/MIN/1.73 M^2
GLUCOSE SERPL-MCNC: 119 MG/DL (ref 70–110)
GLUCOSE UR QL STRIP: NEGATIVE
HCT VFR BLD AUTO: 31.2 % (ref 37–48.5)
HGB BLD-MCNC: 10.1 G/DL (ref 12–16)
HGB UR QL STRIP: NEGATIVE
IMM GRANULOCYTES # BLD AUTO: 0.02 K/UL (ref 0–0.04)
IMM GRANULOCYTES NFR BLD AUTO: 0.3 % (ref 0–0.5)
KETONES UR QL STRIP: NEGATIVE
LEUKOCYTE ESTERASE UR QL STRIP: NEGATIVE
LYMPHOCYTES # BLD AUTO: 1.5 K/UL (ref 1–4.8)
LYMPHOCYTES NFR BLD: 18.2 % (ref 18–48)
MCH RBC QN AUTO: 33.6 PG (ref 27–31)
MCHC RBC AUTO-ENTMCNC: 32.4 G/DL (ref 32–36)
MCV RBC AUTO: 104 FL (ref 82–98)
MONOCYTES # BLD AUTO: 0.7 K/UL (ref 0.3–1)
MONOCYTES NFR BLD: 8.4 % (ref 4–15)
NEUTROPHILS # BLD AUTO: 5.6 K/UL (ref 1.8–7.7)
NEUTROPHILS NFR BLD: 70.2 % (ref 38–73)
NITRITE UR QL STRIP: NEGATIVE
NRBC BLD-RTO: 0 /100 WBC
PH UR STRIP: 5 [PH] (ref 5–8)
PLATELET # BLD AUTO: 218 K/UL (ref 150–350)
PMV BLD AUTO: 11.2 FL (ref 9.2–12.9)
POTASSIUM SERPL-SCNC: 5.3 MMOL/L (ref 3.5–5.1)
PROT UR QL STRIP: NEGATIVE
RBC # BLD AUTO: 3.01 M/UL (ref 4–5.4)
SODIUM SERPL-SCNC: 135 MMOL/L (ref 136–145)
SP GR UR STRIP: 1.02 (ref 1–1.03)
URN SPEC COLLECT METH UR: ABNORMAL
WBC # BLD AUTO: 7.98 K/UL (ref 3.9–12.7)

## 2019-05-27 PROCEDURE — 99233 SBSQ HOSP IP/OBS HIGH 50: CPT | Mod: HCNC,GC,, | Performed by: INTERNAL MEDICINE

## 2019-05-27 PROCEDURE — 99233 PR SUBSEQUENT HOSPITAL CARE,LEVL III: ICD-10-PCS | Mod: HCNC,GC,, | Performed by: INTERNAL MEDICINE

## 2019-05-27 PROCEDURE — 85025 COMPLETE CBC W/AUTO DIFF WBC: CPT | Mod: HCNC

## 2019-05-27 PROCEDURE — S5010 5% DEXTROSE AND 0.45% SALINE: HCPCS | Mod: HCNC | Performed by: INTERNAL MEDICINE

## 2019-05-27 PROCEDURE — 99222 1ST HOSP IP/OBS MODERATE 55: CPT | Mod: HCNC,,, | Performed by: PSYCHIATRY & NEUROLOGY

## 2019-05-27 PROCEDURE — 80048 BASIC METABOLIC PNL TOTAL CA: CPT | Mod: HCNC

## 2019-05-27 PROCEDURE — 25000003 PHARM REV CODE 250: Mod: HCNC | Performed by: INTERNAL MEDICINE

## 2019-05-27 PROCEDURE — 25000003 PHARM REV CODE 250: Mod: HCNC | Performed by: NURSE PRACTITIONER

## 2019-05-27 PROCEDURE — 99221 1ST HOSP IP/OBS SF/LOW 40: CPT | Mod: HCNC,,, | Performed by: RADIOLOGY

## 2019-05-27 PROCEDURE — A9585 GADOBUTROL INJECTION: HCPCS | Mod: HCNC | Performed by: OTOLARYNGOLOGY

## 2019-05-27 PROCEDURE — 99223 1ST HOSP IP/OBS HIGH 75: CPT | Mod: HCNC,,, | Performed by: INTERNAL MEDICINE

## 2019-05-27 PROCEDURE — 20600001 HC STEP DOWN PRIVATE ROOM: Mod: HCNC

## 2019-05-27 PROCEDURE — 99221 PR INITIAL HOSPITAL CARE,LEVL I: ICD-10-PCS | Mod: HCNC,,, | Performed by: RADIOLOGY

## 2019-05-27 PROCEDURE — 81003 URINALYSIS AUTO W/O SCOPE: CPT | Mod: HCNC

## 2019-05-27 PROCEDURE — 99222 PR INITIAL HOSPITAL CARE,LEVL II: ICD-10-PCS | Mod: HCNC,,, | Performed by: PSYCHIATRY & NEUROLOGY

## 2019-05-27 PROCEDURE — 99223 PR INITIAL HOSPITAL CARE,LEVL III: ICD-10-PCS | Mod: HCNC,,, | Performed by: INTERNAL MEDICINE

## 2019-05-27 PROCEDURE — 25000003 PHARM REV CODE 250: Mod: HCNC | Performed by: OTOLARYNGOLOGY

## 2019-05-27 PROCEDURE — 25500020 PHARM REV CODE 255: Mod: HCNC | Performed by: OTOLARYNGOLOGY

## 2019-05-27 PROCEDURE — 36415 COLL VENOUS BLD VENIPUNCTURE: CPT | Mod: HCNC

## 2019-05-27 RX ORDER — GADOBUTROL 604.72 MG/ML
10 INJECTION INTRAVENOUS
Status: COMPLETED | OUTPATIENT
Start: 2019-05-27 | End: 2019-05-27

## 2019-05-27 RX ORDER — HYDROCODONE BITARTRATE AND ACETAMINOPHEN 7.5; 325 MG/15ML; MG/15ML
15 SOLUTION ORAL EVERY 4 HOURS PRN
Status: DISCONTINUED | OUTPATIENT
Start: 2019-05-27 | End: 2019-05-27

## 2019-05-27 RX ORDER — LIDOCAINE HYDROCHLORIDE 20 MG/ML
5 SOLUTION OROPHARYNGEAL EVERY 4 HOURS PRN
Status: DISCONTINUED | OUTPATIENT
Start: 2019-05-27 | End: 2019-05-29 | Stop reason: HOSPADM

## 2019-05-27 RX ORDER — DEXTROSE MONOHYDRATE AND SODIUM CHLORIDE 5; .45 G/100ML; G/100ML
INJECTION, SOLUTION INTRAVENOUS CONTINUOUS
Status: DISCONTINUED | OUTPATIENT
Start: 2019-05-27 | End: 2019-05-29 | Stop reason: HOSPADM

## 2019-05-27 RX ORDER — MIRTAZAPINE 7.5 MG/1
7.5 TABLET, FILM COATED ORAL NIGHTLY
Status: DISCONTINUED | OUTPATIENT
Start: 2019-05-27 | End: 2019-05-29 | Stop reason: HOSPADM

## 2019-05-27 RX ORDER — HYDROCODONE BITARTRATE AND ACETAMINOPHEN 7.5; 325 MG/15ML; MG/15ML
15 SOLUTION ORAL
Status: DISCONTINUED | OUTPATIENT
Start: 2019-05-27 | End: 2019-05-29 | Stop reason: HOSPADM

## 2019-05-27 RX ADMIN — HYDROCODONE BITARTRATE AND ACETAMINOPHEN 15 ML: 7.5; 325 SOLUTION ORAL at 02:05

## 2019-05-27 RX ADMIN — HYDROCODONE BITARTRATE AND ACETAMINOPHEN 15 ML: 7.5; 325 SOLUTION ORAL at 09:05

## 2019-05-27 RX ADMIN — MIRTAZAPINE 7.5 MG: 7.5 TABLET ORAL at 09:05

## 2019-05-27 RX ADMIN — DEXTROSE AND SODIUM CHLORIDE: 5; .45 INJECTION, SOLUTION INTRAVENOUS at 04:05

## 2019-05-27 RX ADMIN — LEVETIRACETAM 1000 MG: 500 TABLET ORAL at 09:05

## 2019-05-27 RX ADMIN — HYDROCODONE BITARTRATE AND ACETAMINOPHEN 15 ML: 7.5; 325 SOLUTION ORAL at 05:05

## 2019-05-27 RX ADMIN — HYDROCODONE BITARTRATE AND ACETAMINOPHEN 15 ML: 7.5; 325 SOLUTION ORAL at 01:05

## 2019-05-27 RX ADMIN — DEXTROSE, SODIUM CHLORIDE, AND POTASSIUM CHLORIDE: 5; .45; .15 INJECTION INTRAVENOUS at 09:05

## 2019-05-27 RX ADMIN — GADOBUTROL 10 ML: 604.72 INJECTION INTRAVENOUS at 11:05

## 2019-05-27 RX ADMIN — HYDROCODONE BITARTRATE AND ACETAMINOPHEN 15 ML: 7.5; 325 SOLUTION ORAL at 07:05

## 2019-05-27 NOTE — SUBJECTIVE & OBJECTIVE
Oncology Treatment Plan:   [No treatment plan]    Current Facility-Administered Medications   Medication    benzonatate capsule 100 mg    dextrose 5 % and 0.45 % NaCl infusion    diazePAM tablet 2 mg    hydrocodone-apap 7.5-325 MG/15 ML oral solution 15 mL    levETIRAcetam tablet 1,000 mg    lidocaine HCl 2% oral solution 5 mL    mirtazapine tablet 7.5 mg    morphine injection 2 mg    ondansetron injection 4 mg    ramelteon tablet 8 mg       Review of patient's allergies indicates:  No Known Allergies     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    Therapy     Transient loss of consciousness 9/28/2012    presumed seizures    Vertigo      Past Surgical History:   Procedure Laterality Date    BLOCK STELLATE GANGLION Right 12/22/2017    [facility]    BLOCK-STELLATE GANGLION Right 8/1/2017    [facility]    LARYNGOSCOPY N/A 4/25/2019    [facility]    [place] DILATION/CURETTAGE,DIAGNOSTIC      D & C     Family History     None        Tobacco Use    Smoking status: Never Smoker    Smokeless tobacco: Never Used   Substance and Sexual Activity    Alcohol use: Yes     Alcohol/week: 1.0 oz     Types: 2 Standard drinks or equivalent per week    Drug use: No    Sexual activity: Not Currently       Review of Systems   Constitutional: Positive for appetite change.   HENT: Positive for ear pain, sore throat and trouble swallowing.    Psychiatric/Behavioral: Positive for dysphoric mood and suicidal ideas.   All other systems reviewed and are negative.    Objective:     Vital Signs (Most Recent):  Temp: 98.1 °F (36.7 °C) (05/27/19 1309)  Pulse: 72 (05/27/19  1309)  Resp: 16 (05/27/19 1309)  BP: 134/73 (05/27/19 1309)  SpO2: 100 % (05/27/19 1309) Vital Signs (24h Range):  Temp:  [96.6 °F (35.9 °C)-98.1 °F (36.7 °C)] 98.1 °F (36.7 °C)  Pulse:  [72-94] 72  Resp:  [16-24] 16  SpO2:  [98 %-100 %] 100 %  BP: ()/(60-98) 134/73     Weight: 52.4 kg (115 lb 8.3 oz)  Body mass index is 19.22 kg/m².  Body surface area is 1.55 meters squared.    Physical Exam   Constitutional: She is oriented to person, place, and time. She appears well-developed and well-nourished.   HENT:   Head: Normocephalic and atraumatic.   Mouth/Throat: Mucous membranes are normal.   Patient declined visual exam of oropharynx   Eyes: Pupils are equal, round, and reactive to light. EOM are normal. No scleral icterus.   Neck: Normal range of motion. Neck supple.   Pulmonary/Chest: Effort normal. No accessory muscle usage. No respiratory distress.   Abdominal: Soft. Normal appearance.   Musculoskeletal: Normal range of motion. She exhibits no edema.   Lymphadenopathy:     She has cervical adenopathy.        Right: No supraclavicular adenopathy present.        Left: No supraclavicular adenopathy present.   Neurological: She is alert and oriented to person, place, and time. She has normal strength. No cranial nerve deficit. Gait normal.   Skin: Skin is warm. No rash noted. No cyanosis.   Psychiatric: Her speech is normal and behavior is normal.   Expressed not wanting to be told she had to keep fighting to live. Stated she had heard of people drinking  to end their lives but stated that's something she could never do.    Vitals reviewed.      Diagnostic Results:  I have personally reviewed the patient's available images and summarized pertinent findings above in HPI.

## 2019-05-27 NOTE — ASSESSMENT & PLAN NOTE
This is a 63 y/o female with likely Stage MIRIAN (cT3-T4a cN2) SCC of the soft palate/oropharynx diagnosed on biopsy 4/25/19. She is admitted for pain control and reduced PO intake. Radiation Oncology is consulted for treatment recommendations.     Based on her PET review, I think she likely has locoregional disease that may be amenable to definitive treatment; however, it is unclear to me if that is something she wants. I discussed definitive chemo-RT with radiation given Mon-Fri x7 weeks (70 Gy in 35 fx) vs. A palliative course of radiation for 2-10 days depending on her goals. As I was leaving the room, Palliative Care team was entering to better clarify her goals of care in helping her reach a decision. If she wishes to receive definitive treatment, then I will have her see my partner Dr. Bar. If she elects palliative treatment for pain and dysphagia relief, then I think the Quad Shot regimen of 3.7 Gy bid x 2 days may be the best option given her circumstances. Potential side effects of RT were briefly reviewed.     Will f/u with patient to discuss how she wishes to proceed.

## 2019-05-27 NOTE — ASSESSMENT & PLAN NOTE
64 y.o. 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 Palliative care consulted for assistance with pain management and goals of care.    Code status: DNR/DNI    Medicolegal: TI is her  Geetha Garcia.    Living situation: No family or friends are involved in her care. She lives in [facility] assisted [facility] facility.     Had goals of care discussion with patient today. Her main priority at this time is better pain control. Initially she was emphasizing she had a right to die in peace and that she was ready to go. She wishes to remain DNR/DNI. Continues to refuse peg tube placement. However, by the end of our encounter she expressed interest in the options presented to her by the radiation oncology and oncology teams. Briefly discussed palliative and hospice options.      Plan/ Recommendations:  --Will schedule hydrocodone-apap 7.5-325 oral soln q4h while awake.   --Lidocaine viscous soln PRN for odynophagia  --Continue PRN IV morphine for severe pain  --Resume diet as tolerated.  --hyperkalemic per labs, so switched maintenance fluids to D5W 1/2NS   --would benefit from palliative radiation tx while inpatient as discussed with rad onc  --will follow along for continued emotional support and advanced care planning

## 2019-05-27 NOTE — CONSULTS
[facility]  Palliative Medicine  Consult Note    Patient Name: Breonna Silva  MRN: [id number]  Admission Date: 2019  Hospital Length of Stay: 1 days  Code Status: DNR   Attending Provider: Dylan Pfeiffer MD  Consulting Provider: Lissett Torres MD  Primary Care Physician: Jarrell Del Valle MD  Principal Problem:<principal problem not specified>    Patient information was obtained from patient and past medical records.      Inpatient consult to Palliative Care  Consult performed by: Lissett Torres MD  Consult ordered by: Angelika Messer MD        Assessment/Plan:     Palliative care encounter  64 y.o. 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 Palliative care consulted for assistance with pain management and goals of care.    Code status: DNR/DNI    Medicolegal: TI is her  Geetha Garcia.    Family/ Living situation: No family or friends are involved in her care. She lives in [facility] assisted living facility.  is . Reports being emotionally/psychologically overwhelmed by his death, losing her home, the death of her dog and having been diagnosed with parkinsonism and now cancer.     Goals of care discussion with patient today: Her main priority at this time is better pain control. Initially she was emphasizing she had a right to die in peace and that she was ready to go. She wishes to remain DNR/DNI. Continues to refuse peg tube placement. However, by the end of our encounter she expressed interest in the options presented to her by the radiation oncology and oncology teams. Briefly discussed palliative and hospice options.      Plan/ Recommendations:  --Will schedule hydrocodone-apap 7.5-325 oral soln q4h while awake.   --Lidocaine viscous soln PRN for odynophagia  --Continue PRN IV morphine  "for severe pain  --Resume diet as tolerated.  --hyperkalemic per labs, so switched maintenance fluids to D5W 1/2NS   --would benefit from palliative radiation tx while inpatient as discussed with rad onc Dr. Saul  --will follow along for continued emotional support and advanced care planning  --will discuss with MPOA (unable to get in contact with her today)        Squamous cell carcinoma of soft palate  See above    Dementia  See above        Thank you for your consult. I will follow-up with patient. Please contact us if you have any additional questions.    Subjective:     HPI:   Ms. Silva is a 64 y.o. woman with PMHx of Parkinsonism, anxiety, HTN, and recently diagnosed scca of the soft palate who presented with uncontrolled pain and inability to tolerate PO. Pain was not relieved with home percocet. Per chart, she was recently seen in clinic and had refused PEG tube placement. She is now unable to tolerate the pain, and taking almost nothing by mouth. Of note, on recent PET she has hypermetabolic lesions of the soft palate, cervical lymph nodes, liver, and iliac crest. Evaluated by gen chung on 5/27 and again declined G tube placement. Palliative care consulted for assistance with pain management and goals of care. Per chart review, during last hospital admission patient was DNR/DNI. Givern hx of mod dementia, code status was also discussed with POA who confirmed the DNR/DNI status.    Hospital Course:  No notes on file    Interval History: No acute vents overnight. Patient refused PEG tube placement today after discussing with gen sx. Oncology and rad onc discussed treatment options with pt. She reports being fearful of the radiation tx and states there is a "lot to learn" regarding the tx options.     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    " Seizures     Squamous cell carcinoma of soft palate 5/3/2019    Therapy     Transient loss of consciousness 9/28/2012    presumed seizures    Vertigo        Past Surgical History:   Procedure Laterality Date    BLOCK STELLATE GANGLION Right 12/22/2017    [facility]    BLOCK-STELLATE GANGLION Right 8/1/2017    [facility]    LARYNGOSCOPY N/A 4/25/2019    [facility]    SD DILATION/CURETTAGE,DIAGNOSTIC      D & C       Review of patient's allergies indicates:  No Known Allergies    Medications:  Continuous Infusions:   dextrose 5 % and 0.45 % NaCl with KCl 20 mEq 100 mL/hr at 05/27/19 1219     Scheduled Meds:   levETIRAcetam  1,000 mg Oral BID     PRN Meds:benzonatate, diazePAM, hydrocodone-apap 7.5-325 MG/15 ML, morphine, ondansetron, ramelteon    Family History     None        Tobacco Use    Smoking status: Never Smoker    Smokeless tobacco: Never Used   Substance and Sexual Activity    Alcohol use: Yes     Alcohol/week: 1.0 oz     Types: 2 Standard drinks or equivalent per week    Drug use: No    Sexual activity: Not Currently       Review of Systems   Constitutional: Positive for fatigue. Negative for fever.   HENT: Positive for sore throat and trouble swallowing.    Respiratory: Negative for cough and shortness of breath.    Cardiovascular: Negative for chest pain and leg swelling.   Gastrointestinal: Negative for constipation and vomiting.   Genitourinary: Negative for difficulty urinating.   Neurological: Positive for weakness. Negative for seizures.   Psychiatric/Behavioral: Negative for agitation and behavioral problems.     Objective:     Vital Signs (Most Recent):  Temp: 98.1 °F (36.7 °C) (05/27/19 1309)  Pulse: 72 (05/27/19 1309)  Resp: 16 (05/27/19 1309)  BP: 134/73 (05/27/19 1309)  SpO2: 100 % (05/27/19 1309) Vital Signs (24h Range):  Temp:  [96.6 °F (35.9 °C)-98.1  °F (36.7 °C)] 98.1 °F (36.7 °C)  Pulse:  [72-94] 72  Resp:  [16-24] 16  SpO2:  [98 %-100 %] 100 %  BP: ()/(60-98) 134/73     Weight: 52.4 kg (115 lb 8.3 oz)  Body mass index is 19.22 kg/m².    Review of Symptoms  Symptom Assessment (ESAS 0-10 scale)   ESAS 0 1 2 3 4 5 6 7 8 9 10   Pain              Dyspnea              Anxiety              Nausea              Depression               Anorexia              Fatigue              Insomnia              Restlessness               Agitation              CAM / Delirium __ --  ___+   Constipation     __ --  ___+   Diarrhea           __ --  ___+  Bowel Management Plan (BMP):     Comments:     Pain Assessment:     OME in 24 hours:     Performance Status: 80    ECOG Performance Status Grade: 1 - Ambulates, capable of light work    Physical Exam   Constitutional: She is oriented to person, place, and time. She appears well-developed. No distress.   Ill appearing   HENT:   Head: Normocephalic and atraumatic.   Eyes: Conjunctivae and EOM are normal.   Neck: Normal range of motion. Neck supple.   Pulmonary/Chest: Effort normal. No respiratory distress.   Abdominal: Soft.   Musculoskeletal: Normal range of motion.   Neurological: She is alert and oriented to person, place, and time.   Skin: Skin is warm and dry.   Psychiatric: She has a normal mood and affect. Her behavior is normal.       Significant Labs: All pertinent labs within the past 24 hours have been reviewed.  CBC:   Recent Labs   Lab 05/27/19  0533   WBC 7.98   HGB 10.1*   HCT 31.2*   *        BMP:  Recent Labs   Lab 05/26/19  1929 05/27/19  0533    119*    135*   K 5.7* 5.3*    111*   CO2 16* 17*   BUN 31* 27*   CREATININE 1.1 1.0   CALCIUM 10.8* 9.1   MG 1.5*  --      LFT:  Lab Results   Component Value Date    AST 12 05/26/2019    ALKPHOS 77 05/26/2019    BILITOT 0.5 05/26/2019     Albumin:   Albumin   Date Value Ref Range Status   05/26/2019 3.8 3.5 - 5.2 g/dL Final     Protein:    Total Protein   Date Value Ref Range Status   2019 8.1 6.0 - 8.4 g/dL Final     Lactic acid:   Lab Results   Component Value Date    LACTATE 0.9 2019    LACTATE 2.3 (H) 2019       Significant Imaging: None    Advance Care Planning   Advanced Directives::  Living Will: NO    LaPOST: No  Do Not Resuscitate Status: Yes  Medical Power of : Yes. Agent's Name: Geetha Garcia (). Agent's Contact Number: [phone]    Decision-Making Capacity: Patient answered questions       Living Arrangements: Lives in assisted living    Psychosocial/Cultural:  Patient's most important priorities:  Pain control. Returning home. Cares deeply about her cat [employer]et[employer]. She is not afraid of dying.    Patient's biggest concerns/fears:      Previous death/end of life care history:    recently.    Patient's goals/hopes:  She hopes to be able to return home.      Spiritual:     F- Tara and Belief:     I - Importance:   .  C - Community:     A - Address in Care:       > 50% of 60 min visit spent in chart review, face to face discussion of goals of care,  symptom assessment, coordination of care and emotional support.    Lissett Torres MD  Palliative Medicine  [facility]

## 2019-05-27 NOTE — PLAN OF CARE
Problem: Adult Inpatient Plan of Care  Goal: Plan of Care Review  Outcome: Revised  POC reviewed with patient who verbalized understanding. VSS on room air. AAOX4 - odd behavior noted. Remains free of falls and injury. Patient up stand by assist to toilet. Urinalysis collected today. Pt went down for MRI and US today.    IVF infusing per MAR. NPO, denies nausea. Pain controlled with PRN medications per MAR. Patient denies chest pain & SOB. TEDS and SCDS in place. No acute events. No distress noted. Bed in lowest position, call light within reach, frequent rounds made for safety.     Dr. Jenkins aware of potassium levels. Multiple consults done today.    WCTM.

## 2019-05-27 NOTE — CONSULTS
[facility]  Radiation Oncology  Consult Note    Patient Name: Breonna Silva  MRN: [id number]  Admission Date: 5/26/2019  Hospital Length of Stay: 1 days  Code Status: DNR   Attending Provider: Dylan Pfeiffer MD  Consulting Provider: Johan Saul MD  Primary Care Physician: Jarrell Del Valle MD  Principal Problem:<principal problem not specified>    Inpatient consult to Radiation Oncology  Consult performed by: Johan Saul MD  Consult ordered by: Angelika Messer MD        Subjective:     HPI:  Mrs. Silva is a 63 y/o female with h/o Parkinsonism who developed Left ear pain in 2019 and was evaluated with MRI Brain 3/25/19 that demonstrated enhancement in the Left inferotemporal fossa. CT Neck 4/12/19 demonstrated a 2.9 x 2.3 x 1.8 cm lesion centered on the Left pharyngeal wall at the level of the oropharynx with enlarged Left cervical nodes. She underwent DL 4/25/19 by Dr. Pfeiffer who noted a Left soft palate ulcerating mass; biopsy revealed invasive mod diff SCC, p16 only focally positive (<1%). PET/CT 5/13/19 demonstrated uptake along the soft palate and Left neck nodes. There was interpretation of liver and Left iliac lesions, but these were very subtle on PET by my interpretation. MRI abdomen today demonstrated TEGAN in liver. She was admitted 5/26/19 for pain control and decreased PO intake. Radiation Oncology is consulted for consideration of treatment options.     I met with the patient at bedside. She reports persistent throat pain and difficulty with swallowing solids and liquids. Earlier today she declined PEG placement. She expressed potentially not wanting any treatments. She had concerns that radiation would leave her disfigured.     Oncology Treatment Plan:   [No treatment plan]    Current Facility-Administered Medications   Medication    benzonatate capsule 100 mg    dextrose 5 % and 0.45 % NaCl infusion    diazePAM tablet 2 mg    hydrocodone-apap 7.5-325  MG/15 ML oral solution 15 mL    levETIRAcetam tablet 1,000 mg    lidocaine HCl 2% oral solution 5 mL    mirtazapine tablet 7.5 mg    morphine injection 2 mg    ondansetron injection 4 mg    ramelteon tablet 8 mg       Review of patient's allergies indicates:  No Known Allergies     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    Therapy     Transient loss of consciousness 9/28/2012    presumed seizures    Vertigo      Past Surgical History:   Procedure Laterality Date    BLOCK STELLATE GANGLION Right 12/22/2017    [facility]formed by [facility]    BLOCK-STELLATE GANGLION Right 8/1/2017    [facility]    LARYNGOSCOPY N/A 4/25/2019    [facility]    CT DILATION/CURETTAGE,DIAGNOSTIC      D & C     Family History     None        Tobacco Use    Smoking status: Never Smoker    Smokeless tobacco: Never Used   Substance and Sexual Activity    Alcohol use: Yes     Alcohol/week: 1.0 oz     Types: 2 Standard drinks or equivalent per week    Drug use: No    Sexual activity: Not Currently       Review of Systems   Constitutional: Positive for appetite change.   HENT: Positive for ear pain, sore throat and trouble swallowing.    Psychiatric/Behavioral: Positive for dysphoric mood and suicidal ideas.   All other systems reviewed and are negative.    Objective:     Vital Signs (Most Recent):  Temp: 98.1 °F (36.7 °C) (05/27/19 1309)  Pulse: 72 (05/27/19 1309)  Resp: 16 (05/27/19 1309)  BP: 134/73 (05/27/19 1309)  SpO2: 100 % (05/27/19 1309) Vital Signs (24h Range):  Temp:  [96.6 °F (35.9 °C)-98.1 °F (36.7 °C)] 98.1 °F (36.7 °C)  Pulse:  [72-94] 72  Resp:  [16-24] 16  SpO2:  [98 %-100 %] 100  %  BP: ()/(60-98) 134/73     Weight: 52.4 kg (115 lb 8.3 oz)  Body mass index is 19.22 kg/m².  Body surface area is 1.55 meters squared.    Physical Exam   Constitutional: She is oriented to person, place, and time. She appears well-developed and well-nourished.   HENT:   Head: Normocephalic and atraumatic.   Mouth/Throat: Mucous membranes are normal.   Patient declined visual exam of oropharynx   Eyes: Pupils are equal, round, and reactive to light. EOM are normal. No scleral icterus.   Neck: Normal range of motion. Neck supple.   Pulmonary/Chest: Effort normal. No accessory muscle usage. No respiratory distress.   Abdominal: Soft. Normal appearance.   Musculoskeletal: Normal range of motion. She exhibits no edema.   Lymphadenopathy:     She has cervical adenopathy.        Right: No supraclavicular adenopathy present.        Left: No supraclavicular adenopathy present.   Neurological: She is alert and oriented to person, place, and time. She has normal strength. No cranial nerve deficit. Gait normal.   Skin: Skin is warm. No rash noted. No cyanosis.   Psychiatric: Her speech is normal and behavior is normal.   Expressed not wanting to be told she had to keep fighting to live. Stated she had heard of people drinking  to end their lives but stated that's something she could never do.    Vitals reviewed.      Diagnostic Results:  I have personally reviewed the patient's available images and summarized pertinent findings above in HPI.     Assessment/Plan:     Squamous cell carcinoma of soft palate  This is a 63 y/o female with likely Stage MIRIAN (cT3-T4a cN2) SCC of the soft palate/oropharynx diagnosed on biopsy 4/25/19. She is admitted for pain control and reduced PO intake. Radiation Oncology is consulted for treatment recommendations.     Based on her PET review, I think she likely has locoregional disease that may be amenable to definitive treatment; however, it is unclear to me if that is  something she wants. I discussed definitive chemo-RT with radiation given Mon-Fri x7 weeks (70 Gy in 35 fx) vs. A palliative course of radiation for 2-10 days depending on her goals. As I was leaving the room, Palliative Care team was entering to better clarify her goals of care in helping her reach a decision. If she wishes to receive definitive treatment, then I will have her see my partner Dr. Bar. If she elects palliative treatment for pain and dysphagia relief, then I think the Quad Shot regimen of 3.7 Gy bid x 2 days may be the best option given her circumstances. Potential side effects of RT were briefly reviewed.     Will f/u with patient to discuss how she wishes to proceed.         Thank you for your consult. I will follow-up with patient. Please contact us if you have any additional questions.    Johan Saul MD  Radiation Oncology  [facility]

## 2019-05-27 NOTE — SUBJECTIVE & OBJECTIVE
Medications:  Continuous Infusions:  Scheduled Meds:  PRN Meds:     No current facility-administered medications on file prior to encounter.      Current Outpatient Medications on File Prior to Encounter   Medication Sig    b complex vitamins tablet Take 1 tablet by mouth once daily.    cyanocobalamin (VITAMIN B-12) 1000 MCG tablet Take 100 mcg by mouth once daily.      diazePAM (VALIUM) 2 MG tablet TAKE 1 TABLET BY MOUTH 3 TIMES A DAY AS NEEDED FOR ANXIETY    folic acid (FOLVITE) 800 MCG Tab Take 800 mcg by mouth once daily.      gabapentin (NEURONTIN) 300 MG capsule TAKE 1 CAPSULE (300 MG TOTAL) BY MOUTH EVERY EVENING.    HYDROcodone-acetaminophen (NORCO)  mg per tablet Take 1 tablet by mouth every 4 (four) hours as needed for Pain. Do NOT take with Valium.    levETIRAcetam (KEPPRA) 1000 MG tablet TAKE 1 TABLET BY MOUTH TWICE A DAY    losartan (COZAAR) 25 MG tablet Take 1 tablet (25 mg total) by mouth once daily.    meclizine (ANTIVERT) 25 mg tablet Take 1 tablet (25 mg total) by mouth 3 (three) times daily as needed for Dizziness.    melatonin 5 mg Tab Take 1 tablet (5 mg total) by mouth nightly as needed.    oxyCODONE-acetaminophen (PERCOCET)  mg per tablet Take 1 tablet by mouth every 4 (four) hours as needed for Pain.    potassium chloride SA (K-DUR,KLOR-CON) 20 MEQ tablet Take 1 tablet (20 mEq total) by mouth daily as needed (when taking furosemide for swelling).       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    Therapy     Transient loss of consciousness 9/28/2012    presumed seizures    Vertigo      Past Surgical History:   Procedure Laterality Date    BLOCK STELLATE GANGLION Right  12/22/2017    [facility]    BLOCK-STELLATE GANGLION Right 8/1/2017    [facility]    LARYNGOSCOPY N/A 4/25/2019    [facility]    [place] DILATION/CURETTAGE,DIAGNOSTIC      D & C     Family History     None        Tobacco Use    Smoking status: Never Smoker    Smokeless tobacco: Never Used   Substance and Sexual Activity    Alcohol use: Yes     Alcohol/week: 1.0 oz     Types: 2 Standard drinks or equivalent per week    Drug use: No    Sexual activity: Not Currently     Review of Systems   Constitutional: Positive for appetite change and fatigue. Negative for activity change and fever.   HENT: Positive for ear pain, sore throat and trouble swallowing. Negative for congestion, facial swelling, hearing loss and tinnitus.    Eyes: Negative for photophobia and pain.   Respiratory: Negative for cough and shortness of breath.    Cardiovascular: Negative for chest pain.   Gastrointestinal: Negative for abdominal pain and nausea.   Musculoskeletal: Negative for arthralgias, gait problem and neck pain.   Skin: Negative for color change and rash.   Neurological: Negative for dizziness and headaches.   Psychiatric/Behavioral: Negative for behavioral problems and confusion.     Objective:     Vital Signs (Most Recent):  Temp: 97.7 °F (36.5 °C) (05/26/19 1848)  Pulse: 80 (05/26/19 1848)  Resp: (!) 24 (05/26/19 1848)  BP: 90/70 (05/26/19 1848)  SpO2: (unable to obtain) (05/26/19 1848) Vital Signs (24h Range):  Temp:  [97.7 °F (36.5 °C)] 97.7 °F (36.5 °C)  Pulse:  [80] 80  Resp:  [24] 24  BP: (90)/(70) 90/70     Weight: 52.4 kg (115 lb 8.3 oz)  Body mass index is 19.22 kg/m².        Physical Exam   Constitutional: Chronically ill-appearing, cachectic / communicating.  NAD.  Eyes: EOM I Bilaterally  Head/Face: Normocephalic.  Negative paranasal sinus pressure/tenderness.  Salivary glands WNL.  House  Brackmann I Bilaterally.  Nose: No gross nasal septal deviation. Inferior Turbinates 2+ bilaterally. No septal perforation. No masses/lesions. External nasal skin without masses/lesions.  Oral Cavity: 2 cm fungating lesion of left soft palate/tonsil   Oropharynx: BOT WNL. No masses/lesions noted. Tonsillar fossa/pharyngeal wall without lesions. Posterior oropharynx WNL.  Soft palate without masses. Midline uvula.   Neck/Lymphatic: palpable, tender left sided lymphadenopathy in level II  Neuro/Psychiatric: AOx3.  Normal mood and affect.   Cardiovascular: Normal carotid pulses bilaterally, no increasing jugular venous distention noted at cervical region bilaterally.    Respiratory: Normal respiratory effort, no stridor, no retractions noted.          Significant Labs:  CBC: No results for input(s): WBC, RBC, HGB, HCT, PLT, MCV, MCH, MCHC in the last 168 hours.  CMP: No results for input(s): GLU, CALCIUM, ALBUMIN, PROT, NA, K, CO2, CL, BUN, CREATININE, ALKPHOS, ALT, AST, BILITOT in the last 168 hours.    Significant Diagnostics:  None

## 2019-05-27 NOTE — PROGRESS NOTES
[facility]  Otorhinolaryngology-Head & Neck Surgery  Progress Note    Subjective:     Post-Op Info:  * No surgery found *      Hospital Day: 2     Interval History: No acute events overnight. Still with pain poorly controlled.     Medications:  Continuous Infusions:   dextrose 5 % and 0.45 % NaCl with KCl 20 mEq 100 mL/hr at 05/27/19 0913     Scheduled Meds:   levETIRAcetam  1,000 mg Oral BID    losartan  25 mg Oral Daily     PRN Meds:benzonatate, diazePAM, hydrocodone-apap 7.5-325 MG/15 ML, morphine, ondansetron, ramelteon     Review of patient's allergies indicates:  No Known Allergies  Objective:     Vital Signs (24h Range):  Temp:  [96.6 °F (35.9 °C)-97.9 °F (36.6 °C)] 97.7 °F (36.5 °C)  Pulse:  [75-94] 80  Resp:  [16-24] 16  SpO2:  [98 %-100 %] 100 %  BP: ()/(60-98) 114/66       Lines/Drains/Airways     Peripheral Intravenous Line                 Peripheral IV - Single Lumen 05/26/19 1938 20 G Left Forearm less than 1 day                Physical Exam   AAO, NAD  Fungating lesion of soft palate visible  No SOB  Tolerating secretions    Significant Labs:  CBC:   Recent Labs   Lab 05/27/19  0533   WBC 7.98   RBC 3.01*   HGB 10.1*   HCT 31.2*      *   MCH 33.6*   MCHC 32.4     CMP:   Recent Labs   Lab 05/26/19 1929 05/27/19  0533    119*   CALCIUM 10.8* 9.1   ALBUMIN 3.8  --    PROT 8.1  --     135*   K 5.7* 5.3*   CO2 16* 17*    111*   BUN 31* 27*   CREATININE 1.1 1.0   ALKPHOS 77  --    ALT 6*  --    AST 12  --    BILITOT 0.5  --        Significant Diagnostics:  None    Assessment/Plan:     Squamous cell carcinoma of soft palate  65 yo woman with scca of soft palate, here with inability to tolerate PO, pain control    -continue IV fluids today  -hycet, morphine for pain control - consult to palliative care for assistance with pain control, in addition to goals of care discussion  -zofran  -tessalon purls for cough  -consult general surgery for PEG  tube placement  -rad/onc, heme/onc consult  -liver ultrasound and MRI abdomen/pelvis for workup of hypermetabolic liver and iliac lesions        Angelika Messer MD  Otorhinolaryngology-Head & Neck Surgery  [facility]

## 2019-05-27 NOTE — H&P
[facility]  Otorhinolaryngology-Head & Neck Surgery  History & Physical    Patient Name: Breonna Silva  MRN: [id number]  Admission Date: 5/26/2019  Attending Physician: Dr. Pfeiffer  Primary Care Provider: Jarrell Del Valle MD    Patient information was obtained from patient, past medical records and ER records.     Subjective:     Chief Complaint/Reason for Admission: unable to tolerate PO    History of Present Illness: 63 yo woman with h/o Parkinsonism, anxiety, HTN, and recently diagnosed scca of the soft palate presents with uncontrolled pain and inability to tolerate PO. She was seen in the clinic last week where it was encouraged that she be admitted for likely PEG tube placement, hydration, and pain control, but she refused, citing a need to get her affairs in order. She is now unable to tolerate the pain, and taking almost nothing by mouth.   Of note, on recent PET she has hypermetabolic lesions of the soft palate, cervical lymph nodes, liver, and iliac crest.    Medications:  Continuous Infusions:  Scheduled Meds:  PRN Meds:     No current facility-administered medications on file prior to encounter.      Current Outpatient Medications on File Prior to Encounter   Medication Sig    b complex vitamins tablet Take 1 tablet by mouth once daily.    cyanocobalamin (VITAMIN B-12) 1000 MCG tablet Take 100 mcg by mouth once daily.      diazePAM (VALIUM) 2 MG tablet TAKE 1 TABLET BY MOUTH 3 TIMES A DAY AS NEEDED FOR ANXIETY    folic acid (FOLVITE) 800 MCG Tab Take 800 mcg by mouth once daily.      gabapentin (NEURONTIN) 300 MG capsule TAKE 1 CAPSULE (300 MG TOTAL) BY MOUTH EVERY EVENING.    HYDROcodone-acetaminophen (NORCO)  mg per tablet Take 1 tablet by mouth every 4 (four) hours as needed for Pain. Do NOT take with Valium.    levETIRAcetam (KEPPRA) 1000 MG tablet TAKE 1 TABLET BY MOUTH TWICE A DAY    losartan (COZAAR) 25 MG tablet Take 1 tablet (25 mg total) by mouth once daily.     meclizine (ANTIVERT) 25 mg tablet Take 1 tablet (25 mg total) by mouth 3 (three) times daily as needed for Dizziness.    melatonin 5 mg Tab Take 1 tablet (5 mg total) by mouth nightly as needed.    oxyCODONE-acetaminophen (PERCOCET)  mg per tablet Take 1 tablet by mouth every 4 (four) hours as needed for Pain.    potassium chloride SA (K-DUR,KLOR-CON) 20 MEQ tablet Take 1 tablet (20 mEq total) by mouth daily as needed (when taking furosemide for swelling).       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    [place] DILATION/CURETTAGE,DIAGNOSTIC      D & C     Family History     None        Tobacco Use    Smoking status: Never Smoker    Smokeless tobacco: Never Used   Substance and Sexual Activity    Alcohol use: Yes     Alcohol/week: 1.0 oz     Types: 2 Standard drinks or equivalent per week    Drug use: No    Sexual activity: Not Currently     Review of Systems   Constitutional: Positive for appetite change and fatigue. Negative for activity change and fever.   HENT: Positive for ear pain, sore throat and trouble swallowing. Negative for congestion, facial swelling, hearing loss and tinnitus.    Eyes: Negative for photophobia and pain.   Respiratory: Negative for cough and shortness of  breath.    Cardiovascular: Negative for chest pain.   Gastrointestinal: Negative for abdominal pain and nausea.   Musculoskeletal: Negative for arthralgias, gait problem and neck pain.   Skin: Negative for color change and rash.   Neurological: Negative for dizziness and headaches.   Psychiatric/Behavioral: Negative for behavioral problems and confusion.     Objective:     Vital Signs (Most Recent):  Temp: 97.7 °F (36.5 °C) (05/26/19 1848)  Pulse: 80 (05/26/19 1848)  Resp: (!) 24 (05/26/19 1848)  BP: 90/70 (05/26/19 1848)  SpO2: (unable to obtain) (05/26/19 1848) Vital Signs (24h Range):  Temp:  [97.7 °F (36.5 °C)] 97.7 °F (36.5 °C)  Pulse:  [80] 80  Resp:  [24] 24  BP: (90)/(70) 90/70     Weight: 52.4 kg (115 lb 8.3 oz)  Body mass index is 19.22 kg/m².        Physical Exam   Constitutional: Chronically ill-appearing, cachectic / communicating.  NAD.  Eyes: EOM I Bilaterally  Head/Face: Normocephalic.  Negative paranasal sinus pressure/tenderness.  Salivary glands WNL.  House Brackmann I Bilaterally.  Nose: No gross nasal septal deviation. Inferior Turbinates 2+ bilaterally. No septal perforation. No masses/lesions. External nasal skin without masses/lesions.  Oral Cavity: 2 cm fungating lesion of left soft palate/tonsil   Oropharynx: BOT WNL. No masses/lesions noted. Tonsillar fossa/pharyngeal wall without lesions. Posterior oropharynx WNL.  Soft palate without masses. Midline uvula.   Neck/Lymphatic: palpable, tender left sided lymphadenopathy in level II  Neuro/Psychiatric: AOx3.  Normal mood and affect.   Cardiovascular: Normal carotid pulses bilaterally, no increasing jugular venous distention noted at cervical region bilaterally.    Respiratory: Normal respiratory effort, no stridor, no retractions noted.          Significant Labs:  CBC: No results for input(s): WBC, RBC, HGB, HCT, PLT, MCV, MCH, MCHC in the last 168 hours.  CMP: No results for input(s): GLU, CALCIUM, ALBUMIN, PROT, NA, K, CO2, CL, BUN,  CREATININE, ALKPHOS, ALT, AST, BILITOT in the last 168 hours.    Significant Diagnostics:  None    Assessment/Plan:     Squamous cell carcinoma of soft palate  65 yo woman with scca of soft palate, here with inability to tolerate PO, pain control    -admit to ENT  -IV fluids. Ok for clears tonight, will make NPO @ midnight  -hycet, morphine for pain control  -zofran  -tessalon purls for cough  -tomorrow will consult general surgery for PEG tube placement  -rad/onc, heme/onc consult  -liver ultrasound for workup of hypermetabolic liver lesion      VTE Risk Mitigation (From admission, onward)    None          Angelika Messer MD  Otorhinolaryngology-Head & Neck Surgery  [facility]

## 2019-05-27 NOTE — HPI
Ms. Silva is a 64 y.o. woman with PMHx of Parkinsonism, anxiety, HTN, and recently diagnosed scca of the soft palate who presented with uncontrolled pain and inability to tolerate PO. Pain was not relieved with home percocet. Per chart, she was recently seen in clinic and had refused PEG tube placement. She is now unable to tolerate the pain, and taking almost nothing by mouth. Of note, on recent PET she has hypermetabolic lesions of the soft palate, cervical lymph nodes, liver, and iliac crest. Evaluated by gen sheldon on 5/27 and again declined G tube placement. Palliative care consulted for assistance with pain management and goals of care. Per chart review, during last hospital admission patient was DNR/DNI. Givern hx of mod dementia, code status was also discussed with POA who confirmed the DNR/DNI status.

## 2019-05-27 NOTE — HPI
63 yo woman with h/o Parkinsonism, anxiety, HTN, and recently diagnosed scca of the soft palate presents with uncontrolled pain and inability to tolerate PO. She was seen in the clinic last week where it was encouraged that she be admitted for likely PEG tube placement, hydration, and pain control, but she refused, citing a need to get her affairs in order. She is now unable to tolerate the pain, and taking almost nothing by mouth.   Of note, on recent PET she has hypermetabolic lesions of the soft palate, cervical lymph nodes, liver, and iliac crest.

## 2019-05-27 NOTE — HPI
Mrs. Silva is a 65 y/o female with h/o Parkinsonism who developed Left ear pain in 2019 and was evaluated with MRI Brain 3/25/19 that demonstrated enhancement in the Left inferotemporal fossa. CT Neck 4/12/19 demonstrated a 2.9 x 2.3 x 1.8 cm lesion centered on the Left pharyngeal wall at the level of the oropharynx with enlarged Left cervical nodes. She underwent DL 4/25/19 by Dr. Pfeiffer who noted a Left soft palate ulcerating mass; biopsy revealed invasive mod diff SCC, p16 only focally positive (<1%). PET/CT 5/13/19 demonstrated uptake along the soft palate and Left neck nodes. There was interpretation of liver and Left iliac lesions, but these were very subtle on PET by my interpretation. MRI abdomen today demonstrated TEGAN in liver. She was admitted 5/26/19 for pain control and decreased PO intake. Radiation Oncology is consulted for consideration of treatment options.     I met with the patient at bedside. She reports persistent throat pain and difficulty with swallowing solids and liquids. Earlier today she declined PEG placement. She expressed potentially not wanting any treatments. She had concerns that radiation would leave her disfigured.

## 2019-05-27 NOTE — ASSESSMENT & PLAN NOTE
"  ASSESSMENT:   Breonna Silva is a 64 y.o. female with a past psychiatric history of Cluster B personality and unspecified mood disorder; here with dysphagia/odynophagia secondary to squamous cell carcinoma.  Patient demonstrates some Cluster B personality traits suggestive of mixed Histrionic/Borderline/Narcissistic traits: shows dramatization, theatricality, and exaggerated expressions of emotion, uses physical appearance to draw attention to herself, and shows some arrogance/haughty attitude ("If I saw psychiatry before they weren't worth anything.")  She has history of multiple adverse childhood experiences and prior trauma history that also may contribute to disordered personality traits.  Personality disorders are best treated with long term outpatient psychotherapy, which this patient has been resistant to.      She has documented history of neurocognitive disorder as well.  Memory is slightly impaired on testing today but patient overall appears cognitively intact for medical decision making currently.  She has a long history of nonadherence with prescribed treatment and outpatient follow up.  Would always involve family in treatment decisions if able (though appears not possible in this case)    She is agreeable to resuming Remeron for mood dysphoria, sleep, and appetite.      IMPRESSION  Psychological Factors affecting medical condition  Cluster B traits  H/o unspecified mood disorder    RECOMMENDATION(S)      1. Scheduled Medication(s):  Remeron SolTab 7.5 mg nightly for sleep, mood, appetite.      2. Legal Status/Precaution(s):  No need for PEC/CEC or involuntary commitment.      Nothing further to add currently.  Psychiatry will sign off.    "

## 2019-05-27 NOTE — CONSULTS
I will be out of the office until 6/3/19.  I will see patient on that date if she is still hospitalized. KISHOR Lacy, PhD

## 2019-05-27 NOTE — ASSESSMENT & PLAN NOTE
65 yo woman with scca of soft palate, here with inability to tolerate PO, pain control    -continue IV fluids today  -hycet, morphine for pain control - consult to palliative care for assistance with pain control, in addition to goals of care discussion  -zofran  -tessalon purls for cough  -consult general surgery for PEG tube placement  -rad/onc, heme/onc consult  -liver ultrasound and MRI abdomen/pelvis for workup of hypermetabolic liver and iliac lesions

## 2019-05-27 NOTE — SUBJECTIVE & OBJECTIVE
"Interval History: No acute vents overnight. Patient refused PEG tube placement today after discussing with gen sheldon. Oncology and rad onc discussed treatment options with pt. She reports being fearful of the radiation tx and states there is a "lot to learn" regarding the tx options.     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    Therapy     Transient loss of consciousness 9/28/2012    presumed seizures    Vertigo        Past Surgical History:   Procedure Laterality Date    BLOCK STELLATE GANGLION Right 12/22/2017    [facility]    BLOCK-STELLATE GANGLION Right 8/1/2017    [facility]    LARYNGOSCOPY N/A 4/25/2019    [facility]    [place] DILATION/CURETTAGE,DIAGNOSTIC      D & C       Review of patient's allergies indicates:  No Known Allergies    Medications:  Continuous Infusions:   dextrose 5 % and 0.45 % NaCl with KCl 20 mEq 100 mL/hr at 05/27/19 1219     Scheduled Meds:   levETIRAcetam  1,000 mg Oral BID     PRN Meds:benzonatate, diazePAM, hydrocodone-apap 7.5-325 MG/15 ML, morphine, ondansetron, ramelteon    Family History     None        Tobacco Use    Smoking status: Never Smoker    Smokeless tobacco: Never Used   Substance and Sexual Activity    Alcohol use: Yes     Alcohol/week: 1.0 oz     Types: 2 Standard drinks or equivalent per week    Drug use: No    Sexual activity: Not Currently       Review of Systems   Constitutional: Positive for fatigue. Negative for fever.   HENT: Positive for sore throat and trouble swallowing.    Respiratory: Negative for cough and shortness of breath.    Cardiovascular: Negative for chest pain and leg swelling. "   Gastrointestinal: Negative for constipation and vomiting.   Genitourinary: Negative for difficulty urinating.   Neurological: Positive for weakness. Negative for seizures.   Psychiatric/Behavioral: Negative for agitation and behavioral problems.     Objective:     Vital Signs (Most Recent):  Temp: 98.1 °F (36.7 °C) (05/27/19 1309)  Pulse: 72 (05/27/19 1309)  Resp: 16 (05/27/19 1309)  BP: 134/73 (05/27/19 1309)  SpO2: 100 % (05/27/19 1309) Vital Signs (24h Range):  Temp:  [96.6 °F (35.9 °C)-98.1 °F (36.7 °C)] 98.1 °F (36.7 °C)  Pulse:  [72-94] 72  Resp:  [16-24] 16  SpO2:  [98 %-100 %] 100 %  BP: ()/(60-98) 134/73     Weight: 52.4 kg (115 lb 8.3 oz)  Body mass index is 19.22 kg/m².    Review of Symptoms  Symptom Assessment (ESAS 0-10 scale)   ESAS 0 1 2 3 4 5 6 7 8 9 10   Pain              Dyspnea              Anxiety              Nausea              Depression               Anorexia              Fatigue              Insomnia              Restlessness               Agitation              CAM / Delirium __ --  ___+   Constipation     __ --  ___+   Diarrhea           __ --  ___+  Bowel Management Plan (BMP):     Comments:     Pain Assessment:     OME in 24 hours:     Performance Status: 80    ECOG Performance Status Grade: 1 - Ambulates, capable of light work    Physical Exam   Constitutional: She is oriented to person, place, and time. She appears well-developed. No distress.   Ill appearing   HENT:   Head: Normocephalic and atraumatic.   Eyes: Conjunctivae and EOM are normal.   Neck: Normal range of motion. Neck supple.   Pulmonary/Chest: Effort normal. No respiratory distress.   Abdominal: Soft.   Musculoskeletal: Normal range of motion.   Neurological: She is alert and oriented to person, place, and time.   Skin: Skin is warm and dry.   Psychiatric: She has a normal mood and affect. Her behavior is normal.       Significant Labs: All pertinent labs within the past 24 hours have been reviewed.  CBC:   Recent  Labs   Lab 19  0533   WBC 7.98   HGB 10.1*   HCT 31.2*   *        BMP:  Recent Labs   Lab 19  1929 19  0533    119*    135*   K 5.7* 5.3*    111*   CO2 16* 17*   BUN 31* 27*   CREATININE 1.1 1.0   CALCIUM 10.8* 9.1   MG 1.5*  --      LFT:  Lab Results   Component Value Date    AST 12 2019    ALKPHOS 77 2019    BILITOT 0.5 2019     Albumin:   Albumin   Date Value Ref Range Status   2019 3.8 3.5 - 5.2 g/dL Final     Protein:   Total Protein   Date Value Ref Range Status   2019 8.1 6.0 - 8.4 g/dL Final     Lactic acid:   Lab Results   Component Value Date    LACTATE 0.9 2019    LACTATE 2.3 (H) 2019       Significant Imaging: None    Advance Care Planning   Advanced Directives::  Living Will: NO    LaPOST: No  Do Not Resuscitate Status: Yes  Medical Power of : Yes. Agent's Name: Geetha Garcia (). Agent's Contact Number: [phone]    Decision-Making Capacity: Patient answered questions       Living Arrangements: Lives in assisted living    Psychosocial/Cultural:  Patient's most important priorities:  Pain control. Returning home. Cares deeply about her cat Bon Appetite. She is not afraid of dying.    Patient's biggest concerns/fears:      Previous death/end of life care history:    recently.    Patient's goals/hopes:  She hopes to be able to return home.      Spiritual:     F- Tara and Belief:     I - Importance:   .  C - Community:     A - Address in Care:
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
## 2019-05-27 NOTE — PLAN OF CARE
Patient is a 64 year old female admitted from home through the ER with inability to eat, severe throat pain.  Patient with multiple consults (Hem/Onc, Rad/Onc, Surgery, Psychiatry and Palliative Care).  Patient declining PEG tube at this time, stated she is thinking about it.  Patient's discharge needs and date pending medical plan.    Patient alone in room during visit.  Patient lives alone in a third floor, one story apartment at [facility] with an elevator to get to her floor.   Patient voiced concern that she left her phone and credit card in her place and does not trust the housekeepers to not take them.  Patient requested this CM call the manager at the facility.  Called [facility] the [facility] ([phone]), spoke with manager, Carmencita, informed of patient's concern for her phone and credit card on her desk.  Carmencita stated she is very familiar with patient, her apartment, and is feeding her cat while she is gone.  Stated she will take care of it.  Also, spoke with Elizabeth, Nurse for [facility], stated patient can not come back to the facility with a PEG tube, it is in the Bylaws of the facility.  Pressed Elizabeth for more information, stated even if patient was self administered bolus feeds, they do not accept client back.  Patient questioned for next of kin, stated she has one friend that owns a gallery on [place], but, she is just an acquaintance.  Stated she only trusts her , Cora Garcia.  Other than that stated she has no one.  [facility] Packet given to patient and/or family with understanding verbalized.  CM name and number written on white board in patient's room with request to call for any questions and concerns.  Will continue to follow for needs.    PCP  Jarrell Del Valle MD  [address] [facility] [id number] [phone]      [facility]/pharmacy #[id number] - [place], [place] - [address] [facility]  [address] [place] [address]  Ave  [facility] [address]  Phone: [phone] Fax: [phone]      Extended Emergency Contact Information  Primary Emergency Contact: Cora Garcia   [place]  Mobile Phone: [phone]  Relation: Friend       05/27/19 1347   Discharge Assessment   Assessment Type Discharge Planning Assessment   Confirmed/corrected address and phone number on facesheet? Yes   Assessment information obtained from? Patient   Expected Length of Stay (days) 4   Communicated expected length of stay with patient/caregiver yes   Prior to hospitilization cognitive status: Alert/Oriented   Prior to hospitalization functional status: Independent   Current cognitive status: Alert/Oriented   Current Functional Status: Independent   Lives With alone   Able to Return to Prior Arrangements yes   Is patient able to care for self after discharge? Yes   Who are your caregiver(s) and their phone number(s)? No one, only lists  as contact, stated she is the only person she trusts   Patient's perception of discharge disposition assisted living   Equipment Currently Used at Home grab bar   Do you have any problems affording any of your prescribed medications? No   Is the patient taking medications as prescribed? yes   Does the patient have transportation home? Yes   Transportation Anticipated public transportation   Discharge Plan A Home   Discharge Plan B Home;Home Health

## 2019-05-27 NOTE — ED PROVIDER NOTES
Encounter Date: 5/26/2019    SCRIBE #1 NOTE: I, Vijay Anne, am scribing for, and in the presence of,  Dr. Hoskins. I have scribed the following portions of the note - the APC attestation.       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  Patient has a throat mass on her left side, she is complaining of progressively worsening pain in her left throat and ear radiating down the left side of her neck.  No relief with home Percocet.  She is endorsing both odynophagia and dysphagia had very poor p.o. intake.  She is tolerating some liquids and creamy soft foods.  She endorses associated change in voice, left-sided retro-orbital headache and chronic shortness of breath which is unchanged from baseline.  She is also reporting nausea, increased frequency of urination and chills. She denies changes in hearing, choking, drooling, fevers or visual changes.        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    Therapy     Transient loss of consciousness 9/28/2012    presumed seizures    Vertigo      Past Surgical History:   Procedure Laterality Date    BLOCK STELLATE GANGLION Right 12/22/2017    [facility]    BLOCK-STELLATE GANGLION Right 8/1/2017    [facility]     LARYNGOSCOPY N/A 4/25/2019    Performed by [facility] at [facility] OR [address] [place] [address]    VT DILATION/CURETTAGE,DIAGNOSTIC      D & C     History reviewed. No pertinent family history.  Social History     Tobacco Use    Smoking status: Never Smoker    Smokeless tobacco: Never Used   Substance Use Topics    Alcohol use: Yes     Alcohol/week: 1.0 oz     Types: 2 Standard drinks or equivalent per week    Drug use: No     Review of Systems   Constitutional: Positive for chills and fatigue. Negative for appetite change, diaphoresis and fever.   HENT: Positive for ear pain, sore throat, trouble swallowing and voice change. Negative for drooling, ear discharge, facial swelling, hearing loss, sinus pressure, sinus pain and tinnitus.    Eyes: Negative for photophobia and visual disturbance.   Respiratory: Positive for shortness of breath. Negative for cough.    Cardiovascular: Negative for chest pain and palpitations.   Gastrointestinal: Positive for nausea. Negative for abdominal pain, constipation, diarrhea and vomiting.   Endocrine: Negative for polydipsia and polyuria.   Genitourinary: Positive for frequency. Negative for difficulty urinating, dysuria, hematuria and urgency.   Musculoskeletal: Positive for neck pain. Negative for back pain.   Skin: Negative for color change and wound.   Neurological: Positive for headaches. Negative for light-headedness.       Physical Exam     Initial Vitals [05/26/19 1848]   BP Pulse Resp Temp SpO2   90/70 80 (!) 24 97.7 °F (36.5 °C) --      MAP       --         Physical Exam    Vitals reviewed.  Constitutional: She is not diaphoretic. No distress.   Thin, chronically ill-appearing   HENT:   Head: Normocephalic and atraumatic.   Mouth/Throat: Oropharynx is clear and moist. Mucous membranes are dry. No trismus in the jaw. No uvula swelling.   Eyes: EOM are normal. Pupils are equal, round, and reactive to light.   Neck: Normal range of motion. Neck supple. No tracheal deviation  present.   Cardiovascular: Normal rate, regular rhythm, normal heart sounds and intact distal pulses. Exam reveals no gallop and no friction rub.    No murmur heard.  Pulmonary/Chest: Breath sounds normal. No stridor. No respiratory distress. She has no wheezes. She has no rhonchi. She has no rales. She exhibits no tenderness.   Abdominal: Soft. Bowel sounds are normal.   Musculoskeletal: Normal range of motion.   Neurological: She is alert and oriented to person, place, and time.   Skin: Skin is warm and dry.   Psychiatric: She has a normal mood and affect.         ED Course   Procedures  Labs Reviewed   CBC W/ AUTO DIFFERENTIAL   COMPREHENSIVE METABOLIC PANEL   MAGNESIUM   URINALYSIS, REFLEX TO URINE CULTURE          Imaging Results    None          Medical Decision Making:   History:   Old Medical Records: I decided to obtain old medical records.  Clinical Tests:   Lab Tests: Ordered and Reviewed  Other:   I have discussed this case with another health care provider.       <> Summary of the Discussion: ENT consulted for admission for dysphagia.       APC / Resident Notes:   64-year-old female presenting for odynophagia and dysphagia associated with her squamous cell carcinoma of the throat. Per chart review, the patient was seen by ENT 5/20, that time they recommended admission given the patient's inability to tolerate p.o. intake.  Patient was unable to come in at that time due to personal reasons.  ED arrival, she is borderline hypotensive 90/70 with otherwise normal vitals.  She is thin and chronically ill-appearing but in no acute distress. Differential diagnosis includes dehydration, malnutrition, electrolyte derangement.  Will check labs, give fluids, morphine for pain, Zofran for nausea and consult ENT.    Patient seen and evaluated by ENT.  They will admit to their service.  Patient comfortable with admission.  I discussed this patient with my supervising physician.    Elva Mcgraw PA-C          Scribe Attestation:   Scribe #1: I performed the above scribed service and the documentation accurately describes the services I performed. I attest to the accuracy of the note.    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   Physical Exam: Cachectic female. Mild distress, secondary to neck and throat pain. No trismus. Tolerating secretions. Speaking in full sentences.   Medical Decision Making: Will check labs, provide symptomatic treatments, and consult ENT.                    Clinical Impression:       ICD-10-CM ICD-9-CM   1. Dysphagia, unspecified type R13.10 787.20   2. Squamous cell carcinoma of soft palate C05.1 145.3   3. Odynophagia R13.10 787.20         Disposition:   Disposition: Admitted  Condition: Jaguar Mcgraw PA-C  05/26/19 1037

## 2019-05-27 NOTE — CONSULTS
"Consult Note    Consults  SUBJECTIVE:     History of Present Illness:    This is a 63-year-old female with past medical history of Parkinson's disease, seizure disorder, who initially developed left ear pain, trouble swelling and had an MRI of the brain done in March 2019 revealed subdural hygroma.  On April 12, 2019 patient was admitted for poor p.o. intake, neck pain and a CT of the soft tissue neck revealed an abscess in the left pharyngeal mucosal space with some mild mass effect on the airway.  ENT was consulted.  On April 25, 2019 patient had direct laryngoscopy with left tonsillar and soft palate biopsies taken.  Pathology came back positive for invasive, moderately differentiated squamous cell carcinoma which is P 16 positive.  PET scan done on May 13, 2019 revealed focal uptake in the soft palate, left cervical lymph nodes, left lobe of the liver, left iliac crest.  However for MRI of the abdomen done today, May 27, 2019 did not reveal any lesions in the liver and was unable to see the iliac crest lesion, recommended MRI of the pelvis.    Patient currently complains of difficulty swallowing both liquids and solids and has declined PEG tube placement.  She was also very reluctant on further treatments and state "if it is time to go, I ago".  However upon further conversation patient is open to further treatments but would like to know all the pros and cons of her treatment plan.    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 OR [facility]    MO DILATION/CURETTAGE,DIAGNOSTIC      D & C     History reviewed. No pertinent family history.  Social History     Tobacco Use    Smoking status: Never Smoker    Smokeless tobacco: Never Used   Substance Use Topics    Alcohol use: Yes     Alcohol/week: 1.0 oz     Types: 2 Standard drinks or equivalent per week    Drug use: No     Review of Systems   Constitutional: Positive for malaise/fatigue and weight loss. Negative for chills and fever.   HENT: Negative for nosebleeds and sore throat.         Difficulty swallowing   Respiratory: Negative for hemoptysis, sputum production and shortness of breath.    Cardiovascular: Negative for chest pain.   Gastrointestinal: Negative for abdominal pain and nausea.   Genitourinary: Negative for frequency, hematuria and urgency.   Musculoskeletal: Negative for back pain.   Neurological: Negative for dizziness, focal weakness, seizures and headaches.   Psychiatric/Behavioral: Negative for suicidal ideas.     OBJECTIVE:     Vital Signs:  Temp:  [97.7 °F (36.5 °C)-98.1 °F (36.7 °C)]   Pulse:  [72-80]   Resp:  [16]   BP: (114-134)/(66-82)   SpO2:  [94 %-100 %]     Physical Exam   Constitutional: She is oriented to person, place, and time. She appears well-developed and well-nourished.   HENT:   Head: Normocephalic and atraumatic.   Mouth/Throat: No oropharyngeal exudate.   Eyes: Pupils are equal, round, and reactive to light. EOM are normal. No scleral icterus.   Neck: Normal range of motion. Neck supple.   Cardiovascular: Normal rate and regular rhythm.   Murmur heard.  Pulmonary/Chest: Effort normal and breath sounds normal. No respiratory distress. She has no wheezes.   Abdominal: Soft. Bowel sounds are normal.   Musculoskeletal: Normal  range of motion. She exhibits no edema or deformity.   Lymphadenopathy:     She has cervical adenopathy.   Neurological: She is alert and oriented to person, place, and time. No cranial nerve deficit.   Skin: Skin is warm and dry. Capillary refill takes less than 2 seconds. No pallor.     Laboratory:  CBC:   Recent Labs   Lab 05/27/19  0533   WBC 7.98   RBC 3.01*   HGB 10.1*   HCT 31.2*      *   MCH 33.6*   MCHC 32.4     CMP:   Recent Labs   Lab 05/26/19  1929 05/27/19  0533    119*   CALCIUM 10.8* 9.1   ALBUMIN 3.8  --    PROT 8.1  --     135*   K 5.7* 5.3*   CO2 16* 17*    111*   BUN 31* 27*   CREATININE 1.1 1.0   ALKPHOS 77  --    ALT 6*  --    AST 12  --    BILITOT 0.5  --        Diagnostic Results:    CT soft tissue neck-April 12, 2019  In this patient with left ear pain and abnormal recent MRI, there is an abscess in the left pharyngeal mucosal space, with some mild mass effect on the airway.  Although imaged in a different modality today, extent appears fairly similar to prior study.    PET scan-May 13, 2019  Hypermetabolic left oropharyngeal lesion with multiple hypermetabolic cervical lymph nodes, more prominent on the left as above.    Mild focal radiotracer uptake identified in the left hepatic lobe and left iliac which are concerning for metastatic disease.  Recommend further evaluation with MRI abdomen/pelvis with contrast.    MRI of the brain with and without contrast-May 27, 2019  No abnormal diffusion restriction or focal signal abnormality in the liver to correspond with the area of FDG avidity seen on PET-CT 05/13/2019.    Left iliac wing is not visualized on this exam.  Consider dedicated pelvic imaging if clinically warranted.    ASSESSMENT/PLAN:     1.  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  She is P 16 focally positive < 1%.  She has likely stage IV based on the PET scan findings.   2.  Dysphagia likely secondary to squamous cell carcinoma of  the soft palate/oropharynx.  Patient refused PEG tube placement  3.  Parkinson's disease    Plan:   1.  Patient reluctantly agreeable to systemic treatment.  Would recommend outpatient chemo RT.  Weekly cisplatin with concurrent radiation treatments.   2.  Recommend to follow this in the clinic after discharge  3.  Recommend to get MRI of the pelvis for further visualization of the left iliac wing    Plan of care discussed with Dr. Jesús Calderon MD PGY 4  Hematology/oncology    STAFF NOTE:  I have personally taken the history and examined this patient and agree with Dr. Calderon's Note as stated above.
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
## 2019-05-27 NOTE — SUBJECTIVE & OBJECTIVE
Interval History: No acute events overnight. Still with pain poorly controlled.     Medications:  Continuous Infusions:   dextrose 5 % and 0.45 % NaCl with KCl 20 mEq 100 mL/hr at 05/27/19 0913     Scheduled Meds:   levETIRAcetam  1,000 mg Oral BID    losartan  25 mg Oral Daily     PRN Meds:benzonatate, diazePAM, hydrocodone-apap 7.5-325 MG/15 ML, morphine, ondansetron, ramelteon     Review of patient's allergies indicates:  No Known Allergies  Objective:     Vital Signs (24h Range):  Temp:  [96.6 °F (35.9 °C)-97.9 °F (36.6 °C)] 97.7 °F (36.5 °C)  Pulse:  [75-94] 80  Resp:  [16-24] 16  SpO2:  [98 %-100 %] 100 %  BP: ()/(60-98) 114/66       Lines/Drains/Airways     Peripheral Intravenous Line                 Peripheral IV - Single Lumen 05/26/19 1938 20 G Left Forearm less than 1 day                Physical Exam   AAO, NAD  Fungating lesion of soft palate visible  No SOB  Tolerating secretions    Significant Labs:  CBC:   Recent Labs   Lab 05/27/19  0533   WBC 7.98   RBC 3.01*   HGB 10.1*   HCT 31.2*      *   MCH 33.6*   MCHC 32.4     CMP:   Recent Labs   Lab 05/26/19 1929 05/27/19  0533    119*   CALCIUM 10.8* 9.1   ALBUMIN 3.8  --    PROT 8.1  --     135*   K 5.7* 5.3*   CO2 16* 17*    111*   BUN 31* 27*   CREATININE 1.1 1.0   ALKPHOS 77  --    ALT 6*  --    AST 12  --    BILITOT 0.5  --        Significant Diagnostics:  None

## 2019-05-27 NOTE — ASSESSMENT & PLAN NOTE
65 yo woman with scca of soft palate, here with inability to tolerate PO, pain control    -admit to ENT  -IV fluids. Ok for clears tonight, will make NPO @ midnight  -hycet, morphine for pain control  -zofran  -tessalon purls for cough  -tomorrow will consult general surgery for PEG tube placement  -rad/onc, heme/onc consult  -liver ultrasound for workup of hypermetabolic liver lesion

## 2019-05-27 NOTE — SUBJECTIVE & OBJECTIVE
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 -- Provider    Therapy -- -- Provider    Transient loss of consciousness 9/28/2012 presumed seizures Provider    Vertigo -- -- Patient          Pertinent Negatives     Diagnosis Date Noted Comments Source    Arthritis 02/07/2015 -- Provider    Asthma 02/07/2015 -- Provider    CHF (congestive heart failure) 02/07/2015 -- Provider    COPD (chronic obstructive pulmonary disease) 02/07/2015 -- Provider    Coronary artery disease 02/07/2015 -- Provider    Diabetes mellitus 02/07/2015 -- Provider    Encounter for blood transfusion 02/07/2015 -- Provider    Stroke 02/07/2015 -- Provider    Thyroid disease 02/07/2015 -- Provider                  Surgical as of 5/27/2019     Past Surgical History     Procedure Laterality Date Comments Source    WI DILATION/CURETTAGE,DIAGNOSTIC -- -- D & C Patient    LARYNGOSCOPY N/A 4/25/2019 Procedure: LARYNGOSCOPY;  Surgeon: Dylan Pfeiffer MD;  Location: [facility] OR [address] [place] [address];  Service: ENT;  Laterality: N/A; Provider                  Family as of 5/27/2019    None           Tobacco Use as of 5/27/2019     Smoking Status Smoking Start Date Smoking Quit Date Packs/Day Years Used    Never Smoker -- -- -- --    Types Comments Smokeless Tobacco Status Smokeless Tobacco Quit Date Source    -- -- Never Used -- Provider            Alcohol Use as of 5/27/2019     Alcohol Use Drinks/Week Alcohol/Week Comments Source    Yes 2 Standard drinks or equivalent 1.0 oz -- Provider    Frequency Standard Drinks Binge Drinking        -- -- --               Drug Use as of 5/27/2019     Drug Use Types Frequency Comments Source    No -- -- -- Provider            Sexual Activity as of 5/27/2019     Sexually Active Birth Control Partners Comments Source    Not Currently -- -- -- Provider            Activities of Daily Living as of 5/27/2019     Activities of Daily Living Question Response Comments Source    Patient feels they ought to cut down on drinking/drug use Not Asked -- Provider    Patient annoyed by others criticizing their drinking/drug use Not Asked -- Provider    Patient has felt bad or guilty about drinking/drug use Not Asked -- Provider    Patient has had a drink/used drugs as an eye opener in the AM Not Asked -- Provider            Social Documentation as of 5/27/2019    Now lives at [facility] in [facility], an assisted living.  Source: Provider           Occupational as of 5/27/2019    None           Socioeconomic as of 5/27/2019     Marital Status Spouse Name Number of Children Years Education Education Level Preferred Language Ethnicity Race Source     -- -- -- -- English /Black Black or  --    Financial Resource Strain Food Insecurity: Worry Food Insecurity: Inability Transportation Needs: Medical Transportation Needs: Non-medical    -- -- -- -- --            Pertinent History     Question Response Comments    Lives with alone --    Place in Birth Order other --    Lives in other --    Number of Siblings other --    Raised by other --    Legal Involvement none --    Childhood Trauma early trauma --    Criminal History of none --    Financial Status other --    Highest Level of Education trade school --    Does patient have access to a firearm? 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    Therapy     Transient loss of consciousness 9/28/2012    presumed seizures    Vertigo      Past Surgical History:   Procedure Laterality Date    BLOCK STELLATE GANGLION Right 12/22/2017    [facility]    BLOCK-STELLATE GANGLION Right 8/1/2017    [facility]    LARYNGOSCOPY N/A 4/25/2019    [facility]    HI DILATION/CURETTAGE,DIAGNOSTIC      D & C     Family History     None        Tobacco Use    Smoking status: Never Smoker    Smokeless tobacco: Never Used   Substance and Sexual Activity    Alcohol use: Yes     Alcohol/week: 1.0 oz     Types: 2 Standard drinks or equivalent per week    Drug use: No    Sexual activity: Not Currently     Review of patient's allergies indicates:  No Known Allergies    No current facility-administered medications on file prior to encounter.      Current Outpatient Medications on File Prior to Encounter   Medication Sig    b complex vitamins tablet Take 1 tablet by mouth once daily.    cyanocobalamin (VITAMIN B-12) 1000 MCG tablet Take 100 mcg by mouth once daily.      diazePAM (VALIUM) 2 MG tablet TAKE 1 TABLET BY MOUTH 3 TIMES A DAY AS NEEDED FOR ANXIETY    folic acid (FOLVITE) 800 MCG Tab Take 800 mcg by mouth once daily.      gabapentin (NEURONTIN) 300 MG capsule TAKE 1 CAPSULE (300 MG TOTAL) BY MOUTH EVERY EVENING.    HYDROcodone-acetaminophen (NORCO)  mg per tablet Take 1 tablet by mouth every 4 (four) hours as needed for Pain. Do NOT take with Valium.    levETIRAcetam (KEPPRA) 1000 MG tablet TAKE 1 TABLET BY MOUTH TWICE A DAY    losartan (COZAAR) 25 MG tablet Take 1 tablet (25 mg total) by mouth once daily.    meclizine (ANTIVERT) 25 mg tablet Take 1 tablet (25 mg total) by mouth 3 (three) times daily  as needed for Dizziness.    melatonin 5 mg Tab Take 1 tablet (5 mg total) by mouth nightly as needed.    oxyCODONE-acetaminophen (PERCOCET)  mg per tablet Take 1 tablet by mouth every 4 (four) hours as needed for Pain.    potassium chloride SA (K-DUR,KLOR-CON) 20 MEQ tablet Take 1 tablet (20 mEq total) by mouth daily as needed (when taking furosemide for swelling).     Psychotherapeutics (From admission, onward)    Start     Stop Route Frequency Ordered    05/27/19 2100  mirtazapine tablet 7.5 mg      -- Oral Nightly 05/27/19 1427 05/26/19 2100  ramelteon tablet 8 mg      -- Oral Nightly PRN 05/26/19 2000 05/26/19 2059  diazePAM tablet 2 mg      -- Oral Every 12 hours PRN 05/26/19 2000        Review of Systems  Strengths and Liabilities: Strength: Patient is expressive/articulate., Liability: Patient has poor health., Liability: Patient has poor judgment, Liability: Patient has possible cognitive impairment., Liability: Patient lacks coping skills.    Objective:     Vital Signs (Most Recent):  Temp: 98.1 °F (36.7 °C) (05/27/19 1309)  Pulse: 72 (05/27/19 1309)  Resp: 16 (05/27/19 1309)  BP: 134/73 (05/27/19 1309)  SpO2: 100 % (05/27/19 1309) Vital Signs (24h Range):  Temp:  [96.6 °F (35.9 °C)-98.1 °F (36.7 °C)] 98.1 °F (36.7 °C)  Pulse:  [72-94] 72  Resp:  [16-24] 16  SpO2:  [98 %-100 %] 100 %  BP: ()/(60-98) 134/73        Weight: 52.4 kg (115 lb 8.3 oz)  Body mass index is 19.22 kg/m².      Intake/Output Summary (Last 24 hours) at 5/27/2019 1437  Last data filed at 5/27/2019 1419  Gross per 24 hour   Intake 1256.67 ml   Output 675 ml   Net 581.67 ml       Physical Exam      Mental Status Exam:  Appearance: well groomed, heavily made up female  Grooming: appropriate to situation  Arousal: alert, awake  Behavior/Cooperation: friendly and cooperative  Speech: spontaneous, fluent, quantity increased, rate slightly slower than normal  Language: appropriate english vocabulary  Mood: depressed  Affect:  reactive  Thought Process: linear  Thought Content: No SI/HI/AVH  Associations: no loose associations noted  Orientation: to self, situation, location.  Stated date as June 20th, 2019.  Memory: Registers 3/3 and recalls 2/3 after 5 minutes  Fund of Knowledge: appropriate for education level  Attention Span/Concentration: Normal and able to spell WORLD forward and backward  Insight: limited  Judgment: limited      Significant Labs:   Last 24 Hours:   Recent Lab Results       05/27/19  0928   05/27/19  0533   05/26/19  1929        Albumin     3.8     Alkaline Phosphatase     77     ALT     6     Anion Gap   7 13     Appearance, UA Hazy         AST     12  Comment:  *Result may be interfered by visible hemolysis     Baso #   0.02 0.03     Basophil%   0.3 0.3     Bilirubin (UA) Negative         BILIRUBIN TOTAL     0.5  Comment:  For infants and newborns, interpretation of results should be based  on gestational age, weight and in agreement with clinical  observations.  Premature Infant recommended reference ranges:  Up to 24 hours.............<8.0 mg/dL  Up to 48 hours............<12.0 mg/dL  3-5 days..................<15.0 mg/dL  6-29 days.................<15.0 mg/dL       BUN, Bld   27 31     Calcium   9.1 10.8     Chloride   111 107     CO2   17 16     Color, UA Yellow         Creatinine   1.0 1.1     Differential Method   Automated Automated     eGFR if    >60.0 >60.0     eGFR if non    59.7  Comment:  Calculation used to obtain the estimated glomerular filtration  rate (eGFR) is the CKD-EPI equation.    53.2  Comment:  Calculation used to obtain the estimated glomerular filtration  rate (eGFR) is the CKD-EPI equation.        Eos #   0.2 0.1     Eosinophil%   2.6 1.4     Glucose   119 109     Glucose, UA Negative         Gran # (ANC)   5.6 7.7     Gran%   70.2 83.8     Hematocrit   31.2 37.1     Hemoglobin   10.1 12.2     Immature Grans (Abs)   0.02  Comment:  Mild elevation in  immature granulocytes is non specific and   can be seen in a variety of conditions including stress response,   acute inflammation, trauma and pregnancy. Correlation with other   laboratory and clinical findings is essential.   0.02  Comment:  Mild elevation in immature granulocytes is non specific and   can be seen in a variety of conditions including stress response,   acute inflammation, trauma and pregnancy. Correlation with other   laboratory and clinical findings is essential.       Immature Granulocytes   0.3 0.2     Ketones, UA Negative         Leukocytes, UA Negative         Lymph #   1.5 0.9     Lymph%   18.2 9.7     Magnesium     1.5     MCH   33.6 34.2     MCHC   32.4 32.9     MCV   104 104     Mono #   0.7 0.4     Mono%   8.4 4.6     MPV   11.2 11.7     NITRITE UA Negative         nRBC   0 0     Occult Blood UA Negative         pH, UA 5.0         Platelets   218 240     Potassium   5.3  Comment:  *No Visible Hemolysis 5.7     PROTEIN TOTAL     8.1     Protein, UA Negative  Comment:  Recommend a 24 hour urine protein or a urine   protein/creatinine ratio if globulin induced proteinuria is  clinically suspected.           RBC   3.01 3.57     RDW   11.8 11.6     Sodium   135 136     Specific Gravity, UA 1.020         Specimen UA Urine, Clean Catch         WBC   7.98 9.22           Significant Imaging: MRI brain 3/20/19.  Prominent subdural hygromas.  Generalized cerebral volume loss with prominence of sulci and cisterns.

## 2019-05-27 NOTE — CONSULTS
[facility]  Attending: Donnie  Resident: Brian   CC: Evaluation for G tube    HPI: Breonna Silva is a pleasant 64 y.o. woman with metastatic SCC of the soft palate, admitted to ENT for pain control and further workup of her SCC.    The patient has dysphagia to solid foods, and cold liquids currently.  She does okay with soft foods and liquids that are not cold.    She has had no abdominal surgeries in the past.    She states that she does not want a G tube.     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 No pertinent family history.    Social History     Socioeconomic History    Marital status:      Spouse name: Not on file    Number of children: Not on file    Years of education: Not on file    Highest education level: Not on file   Occupational History    Not on file   Social Needs    Financial resource strain: Not on file    Food insecurity:     Worry: Not on file     Inability: Not on file    Transportation needs:     Medical: Not on file     Non-medical: Not on file   Tobacco Use    Smoking status: Never Smoker    Smokeless tobacco: Never Used   Substance and Sexual Activity    Alcohol use: Yes     Alcohol/week: 1.0 oz     Types: 2 Standard drinks or equivalent  per week    Drug use: No    Sexual activity: Not Currently   Lifestyle    Physical activity:     Days per week: Not on file     Minutes per session: Not on file    Stress: Not on file   Relationships    Social connections:     Talks on phone: Not on file     Gets together: Not on file     Attends Taoist service: Not on file     Active member of club or organization: Not on file     Attends meetings of clubs or organizations: Not on file     Relationship status: Not on file   Other Topics Concern    Patient feels they ought to cut down on drinking/drug use Not Asked    Patient annoyed by others criticizing their drinking/drug use Not Asked    Patient has felt bad or guilty about drinking/drug use Not Asked    Patient has had a drink/used drugs as an eye opener in the AM Not Asked   Social History Narrative    Now lives at [facility] in [facility], an assisted living.       No current facility-administered medications on file prior to encounter.      Current Outpatient Medications on File Prior to Encounter   Medication Sig Dispense Refill    b complex vitamins tablet Take 1 tablet by mouth once daily.      cyanocobalamin (VITAMIN B-12) 1000 MCG tablet Take 100 mcg by mouth once daily.        diazePAM (VALIUM) 2 MG tablet TAKE 1 TABLET BY MOUTH 3 TIMES A DAY AS NEEDED FOR ANXIETY 90 tablet 2    folic acid (FOLVITE) 800 MCG Tab Take 800 mcg by mouth once daily.        gabapentin (NEURONTIN) 300 MG capsule TAKE 1 CAPSULE (300 MG TOTAL) BY MOUTH EVERY EVENING. 30 capsule 10    HYDROcodone-acetaminophen (NORCO)  mg per tablet Take 1 tablet by mouth every 4 (four) hours as needed for Pain. Do NOT take with Valium. 30 tablet 0    levETIRAcetam (KEPPRA) 1000 MG tablet TAKE 1 TABLET BY MOUTH TWICE A DAY 60 tablet 6    losartan (COZAAR) 25 MG tablet Take 1 tablet (25 mg total) by mouth once daily. 90 tablet 3    meclizine (ANTIVERT) 25 mg tablet Take 1 tablet (25 mg total) by mouth 3 (three) times daily  as needed for Dizziness. 30 tablet 0    melatonin 5 mg Tab Take 1 tablet (5 mg total) by mouth nightly as needed.      oxyCODONE-acetaminophen (PERCOCET)  mg per tablet Take 1 tablet by mouth every 4 (four) hours as needed for Pain. 30 tablet 0    potassium chloride SA (K-DUR,KLOR-CON) 20 MEQ tablet Take 1 tablet (20 mEq total) by mouth daily as needed (when taking furosemide for swelling). 30 tablet 1       Review of patient's allergies indicates:  No Known Allergies    ROS:   Constitutional: no fever or chills, pain controlled   Eyes: No eye pain or diplopia   Ears: No change in hearing or tinnitus  Nose: No epistaxis or frequent colds  Respiratory: No cough or dyspnea  Cardiovascular: no chest pain or palpitations   Gastrointestinal: no abdominal pain or vomiting   Genitourinary: no hematuria or dysuria   Hematologic/Lymphatic: no easy bruising or lymphadenopathy   Musculoskeletal: no arthralgias or myalgias   Neurological: no seizures or tremors   Skin: No rashes or itching    Phys:  Vitals:    05/26/19 2125 05/26/19 2354 05/27/19 0340 05/27/19 0759   BP: 134/71 113/67 128/60 114/66   BP Location: Left arm Right arm     Patient Position: Lying Lying Lying Lying   Pulse: 94 75 79 80   Resp: 20 16 20 16   Temp: 97.8 °F (36.6 °C) 96.6 °F (35.9 °C) 97.9 °F (36.6 °C) 97.7 °F (36.5 °C)   TempSrc: Oral Oral Oral    SpO2: 98% 100% 100% 100%   Weight:            Phys:   Gen: NAD   HEENT: NCAT, trachea midline  CV: RRR, no m/r/g   Pulm: Unlabored  Abd: Soft, nttp. No rebound, guarding.  No scars  Extremities: no cyanosis or edema, or clubbing  Skin: Skin color, texture, turgor normal. No rashes or lesions     A/P Dysphagia    Reasonable surgical candidate for PEG, however she declines the procedure at this time  Please call back if she changes her mind    Ifeanyi Torres MD  General Surgery, PGY-5  [phone]

## 2019-05-28 LAB
ANION GAP SERPL CALC-SCNC: 7 MMOL/L (ref 8–16)
BASOPHILS # BLD AUTO: 0.02 K/UL (ref 0–0.2)
BASOPHILS NFR BLD: 0.3 % (ref 0–1.9)
BUN SERPL-MCNC: 12 MG/DL (ref 8–23)
CALCIUM SERPL-MCNC: 9 MG/DL (ref 8.7–10.5)
CHLORIDE SERPL-SCNC: 109 MMOL/L (ref 95–110)
CO2 SERPL-SCNC: 20 MMOL/L (ref 23–29)
CREAT SERPL-MCNC: 0.7 MG/DL (ref 0.5–1.4)
DIFFERENTIAL METHOD: ABNORMAL
EOSINOPHIL # BLD AUTO: 0.3 K/UL (ref 0–0.5)
EOSINOPHIL NFR BLD: 4.3 % (ref 0–8)
ERYTHROCYTE [DISTWIDTH] IN BLOOD BY AUTOMATED COUNT: 11.8 % (ref 11.5–14.5)
EST. GFR  (AFRICAN AMERICAN): >60 ML/MIN/1.73 M^2
EST. GFR  (NON AFRICAN AMERICAN): >60 ML/MIN/1.73 M^2
GLUCOSE SERPL-MCNC: 119 MG/DL (ref 70–110)
HCT VFR BLD AUTO: 31.4 % (ref 37–48.5)
HGB BLD-MCNC: 10.1 G/DL (ref 12–16)
IMM GRANULOCYTES # BLD AUTO: 0.03 K/UL (ref 0–0.04)
IMM GRANULOCYTES NFR BLD AUTO: 0.4 % (ref 0–0.5)
LYMPHOCYTES # BLD AUTO: 1.2 K/UL (ref 1–4.8)
LYMPHOCYTES NFR BLD: 18 % (ref 18–48)
MCH RBC QN AUTO: 33.6 PG (ref 27–31)
MCHC RBC AUTO-ENTMCNC: 32.2 G/DL (ref 32–36)
MCV RBC AUTO: 104 FL (ref 82–98)
MONOCYTES # BLD AUTO: 0.5 K/UL (ref 0.3–1)
MONOCYTES NFR BLD: 7.8 % (ref 4–15)
NEUTROPHILS # BLD AUTO: 4.8 K/UL (ref 1.8–7.7)
NEUTROPHILS NFR BLD: 69.2 % (ref 38–73)
NRBC BLD-RTO: 0 /100 WBC
PLATELET # BLD AUTO: 216 K/UL (ref 150–350)
PMV BLD AUTO: 11.3 FL (ref 9.2–12.9)
POTASSIUM SERPL-SCNC: 4.8 MMOL/L (ref 3.5–5.1)
RBC # BLD AUTO: 3.01 M/UL (ref 4–5.4)
SODIUM SERPL-SCNC: 136 MMOL/L (ref 136–145)
WBC # BLD AUTO: 6.9 K/UL (ref 3.9–12.7)

## 2019-05-28 PROCEDURE — 63600175 PHARM REV CODE 636 W HCPCS: Mod: HCNC

## 2019-05-28 PROCEDURE — 20600001 HC STEP DOWN PRIVATE ROOM: Mod: HCNC

## 2019-05-28 PROCEDURE — 25000003 PHARM REV CODE 250: Mod: HCNC | Performed by: NURSE PRACTITIONER

## 2019-05-28 PROCEDURE — 80048 BASIC METABOLIC PNL TOTAL CA: CPT | Mod: HCNC

## 2019-05-28 PROCEDURE — 36415 COLL VENOUS BLD VENIPUNCTURE: CPT | Mod: HCNC

## 2019-05-28 PROCEDURE — 99232 SBSQ HOSP IP/OBS MODERATE 35: CPT | Mod: HCNC,,, | Performed by: INTERNAL MEDICINE

## 2019-05-28 PROCEDURE — 25000003 PHARM REV CODE 250: Mod: HCNC | Performed by: OTOLARYNGOLOGY

## 2019-05-28 PROCEDURE — S5010 5% DEXTROSE AND 0.45% SALINE: HCPCS | Mod: HCNC | Performed by: INTERNAL MEDICINE

## 2019-05-28 PROCEDURE — 25000003 PHARM REV CODE 250: Mod: HCNC | Performed by: INTERNAL MEDICINE

## 2019-05-28 PROCEDURE — 63600175 PHARM REV CODE 636 W HCPCS: Mod: HCNC | Performed by: OTOLARYNGOLOGY

## 2019-05-28 PROCEDURE — 25000003 PHARM REV CODE 250: Mod: HCNC

## 2019-05-28 PROCEDURE — 85025 COMPLETE CBC W/AUTO DIFF WBC: CPT | Mod: HCNC

## 2019-05-28 PROCEDURE — 99232 PR SUBSEQUENT HOSPITAL CARE,LEVL II: ICD-10-PCS | Mod: HCNC,,, | Performed by: INTERNAL MEDICINE

## 2019-05-28 RX ORDER — ACETAMINOPHEN 500 MG
500 TABLET ORAL 2 TIMES DAILY PRN
Status: DISCONTINUED | OUTPATIENT
Start: 2019-05-28 | End: 2019-05-29 | Stop reason: HOSPADM

## 2019-05-28 RX ORDER — TRIPROLIDINE/PSEUDOEPHEDRINE 2.5MG-60MG
600 TABLET ORAL 3 TIMES DAILY
Status: DISCONTINUED | OUTPATIENT
Start: 2019-05-28 | End: 2019-05-29 | Stop reason: HOSPADM

## 2019-05-28 RX ORDER — GABAPENTIN 300 MG/1
300 CAPSULE ORAL 3 TIMES DAILY
Status: DISCONTINUED | OUTPATIENT
Start: 2019-05-28 | End: 2019-05-29 | Stop reason: HOSPADM

## 2019-05-28 RX ORDER — DEXAMETHASONE SODIUM PHOSPHATE 4 MG/ML
10 INJECTION, SOLUTION INTRA-ARTICULAR; INTRALESIONAL; INTRAMUSCULAR; INTRAVENOUS; SOFT TISSUE ONCE
Status: COMPLETED | OUTPATIENT
Start: 2019-05-28 | End: 2019-05-28

## 2019-05-28 RX ADMIN — RAMELTEON 8 MG: 8 TABLET, FILM COATED ORAL at 09:05

## 2019-05-28 RX ADMIN — IBUPROFEN 600 MG: 100 SUSPENSION ORAL at 09:05

## 2019-05-28 RX ADMIN — MORPHINE SULFATE 2 MG: 2 INJECTION, SOLUTION INTRAMUSCULAR; INTRAVENOUS at 03:05

## 2019-05-28 RX ADMIN — DEXAMETHASONE SODIUM PHOSPHATE 10 MG: 4 INJECTION, SOLUTION INTRAMUSCULAR; INTRAVENOUS at 04:05

## 2019-05-28 RX ADMIN — GABAPENTIN 300 MG: 300 CAPSULE ORAL at 09:05

## 2019-05-28 RX ADMIN — LIDOCAINE HYDROCHLORIDE 5 ML: 20 SOLUTION ORAL; TOPICAL at 09:05

## 2019-05-28 RX ADMIN — HYDROCODONE BITARTRATE AND ACETAMINOPHEN 15 ML: 7.5; 325 SOLUTION ORAL at 01:05

## 2019-05-28 RX ADMIN — GABAPENTIN 300 MG: 300 CAPSULE ORAL at 03:05

## 2019-05-28 RX ADMIN — HYDROCODONE BITARTRATE AND ACETAMINOPHEN 15 ML: 7.5; 325 SOLUTION ORAL at 09:05

## 2019-05-28 RX ADMIN — HYDROCODONE BITARTRATE AND ACETAMINOPHEN 15 ML: 7.5; 325 SOLUTION ORAL at 05:05

## 2019-05-28 RX ADMIN — MIRTAZAPINE 7.5 MG: 7.5 TABLET ORAL at 09:05

## 2019-05-28 RX ADMIN — DEXTROSE AND SODIUM CHLORIDE: 5; .45 INJECTION, SOLUTION INTRAVENOUS at 03:05

## 2019-05-28 RX ADMIN — DIAZEPAM 2 MG: 2 TABLET ORAL at 09:05

## 2019-05-28 RX ADMIN — HYDROCODONE BITARTRATE AND ACETAMINOPHEN 15 ML: 7.5; 325 SOLUTION ORAL at 06:05

## 2019-05-28 RX ADMIN — LEVETIRACETAM 1000 MG: 500 TABLET ORAL at 08:05

## 2019-05-28 RX ADMIN — DIAZEPAM 2 MG: 2 TABLET ORAL at 12:05

## 2019-05-28 RX ADMIN — RAMELTEON 8 MG: 8 TABLET, FILM COATED ORAL at 12:05

## 2019-05-28 RX ADMIN — LIDOCAINE HYDROCHLORIDE 5 ML: 20 SOLUTION ORAL; TOPICAL at 01:05

## 2019-05-28 RX ADMIN — HYDROCODONE BITARTRATE AND ACETAMINOPHEN 15 ML: 7.5; 325 SOLUTION ORAL at 10:05

## 2019-05-28 RX ADMIN — LEVETIRACETAM 1000 MG: 500 TABLET ORAL at 09:05

## 2019-05-28 NOTE — ASSESSMENT & PLAN NOTE
"64 y.o. 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 Palliative care consulted for assistance with pain management and goals of care.    Code status: DNR/DNI    Medicolegal: POA is her  Geetha Garcia.    Living situation: No family or friends are involved in her care. [facility] lives in [facility] assisted [facility] facility.     Per goals of care discussion on 5/27, her main priority is better pain control. Initially she was emphasizing she had a right to die in peace and that she was ready to go; wishing to remain DNR/DNI. Continues to refuse peg tube placement. However, by the end of our encounter she expressed interest in the options presented to her by the radiation oncology and oncology teams. Briefly discussed palliative and hospice options. Of note, she would not be accepted back to her assisted living facility with PEG tube.    5/28: Pt still undecided if she wants to proceed with treatment. States she still has "more to learn."    Plan:  --Pain well controlled with hydrocodone-apap 7.5-325 oral soln q4h while awake and lidocaine viscous soln PRN; continue  --Pt unsure if she wants to pursue treatment options (chemotherapy and radiation therapy).  --Have tried to reach KIRTI Sabillon, to discuss plan of care, but have thus far been unsuccessful.  --Agree with discharging pt to assisted living facility with outpatient heme-onc and rad onc follow-up for continued discussion of treatment options.  --Will benefit of outpatient palliative care. Have taken the liberty to place referral.              "

## 2019-05-28 NOTE — ASSESSMENT & PLAN NOTE
65 yo woman with scca of soft palate, here with inability to tolerate PO, pain control    -continue IV fluids today  -start dysphagia diet  -hycet, morphine for pain control - consult to palliative care for assistance with pain control, will discuss further recs  -zofran  -tessalon purls for cough  -heme/onc recommends outpatient chemo/RT for treatment  -MRI/ultrasound of liver negative for mass  -discharge once pain controlled if tolerating PO

## 2019-05-28 NOTE — PROGRESS NOTES
"[facility]  Palliative Medicine  Progress Note    Patient Name: Breonna Silva  MRN: [id number]  Admission Date: 5/26/2019  Hospital Length of Stay: 2 days  Code Status: DNR   Attending Provider: Dylan Pfeiffer MD  Consulting Provider: Lissett Torres MD  Primary Care Physician: Jarrell Del Valle MD  Principal Problem:Squamous cell carcinoma of soft palate    Patient information was obtained from patient and past medical records.      Assessment/Plan:     Palliative care encounter  64 y.o. 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 Palliative care consulted for assistance with pain management and goals of care.    Code status: DNR/DNI    Medicolegal: POA is her  Geetha Garcia.    Living situation: No family or friends are involved in her care. She lives in [facility] assisted living facility.     Per goals of care discussion on 5/27, her main priority is better pain control. Initially she was emphasizing she had a right to die in peace and that she was ready to go; wishing to remain DNR/DNI. Continues to refuse peg tube placement. However, by the end of our encounter she expressed interest in the options presented to her by the radiation oncology and oncology teams. Briefly discussed palliative and hospice options. Of note, she would not be accepted back to her assisted living facility with PEG tube.    5/28: Pt still undecided if she wants to proceed with treatment. States she still has "more to learn."    Plan:  --Pain well controlled with hydrocodone-apap 7.5-325 oral soln q4h while awake and lidocaine viscous soln PRN; continue  --Pt unsure if she wants to pursue treatment options (chemotherapy and radiation therapy).  --Have tried to reach Ms. GarciaISISANDERSON, to discuss plan of care, but have thus far been unsuccessful.  --Agree " with discharging pt to assisted living facility with outpatient heme-onc and rad onc follow-up for continued discussion of treatment options.  --Will benefit of outpatient palliative care. Have taken the liberty to place referral.                Dementia  See above        I will follow-up with patient. Please contact us if you have any additional questions.    Subjective:     Chief Complaint:   Chief Complaint   Patient presents with    Cancer     patient states she was supposed to have appointment with oncologist on 5/24/2019 but missed appointment  - patient has newly diagnosed throat cancer and states she is not able to eat and the pain is severe to her neck       HPI:   Ms. Silva is a 64 y.o. woman with PMHx of Parkinsonism, anxiety, HTN, and recently diagnosed scca of the soft palate who presented with uncontrolled pain and inability to tolerate PO. Pain was not relieved with home percocet. Per chart, she was recently seen in clinic and had refused PEG tube placement. She is now unable to tolerate the pain, and taking almost nothing by mouth. Of note, on recent PET she has hypermetabolic lesions of the soft palate, cervical lymph nodes, liver, and iliac crest. Evaluated by gen chung on 5/27 and again declined G tube placement. Palliative care consulted for assistance with pain management and goals of care. Per chart review, during last hospital admission patient was DNR/DNI. Givern hx of mod dementia, code status was also discussed with POA who confirmed the DNR/DNI status.    Hospital Course:  No notes on file    Interval History: Reports being in severe pain this morning. Pain extends from left ear down to left side of throat. Pain is constant. There is also tenderness to palpation in the region. Not worsened by chewing. Pain is worse when she swallows and is unable to open her mouth fully. Says her pain is not well controlled. She is upset this morning because she claims to have called her nurse 4 times for  assistance to got the bathroom before he finally showed up and she ended up soiling the floor. On re-evaluation this afternoon pt found to be in better spirits without any pain.    Medications:  Continuous Infusions:   dextrose 5 % and 0.45 % NaCl 100 mL/hr at 05/28/19 0354     Scheduled Meds:   gabapentin  300 mg Oral TID    hydrocodone-apap 7.5-325 MG/15 ML  15 mL Oral Q4H While awake    levETIRAcetam  1,000 mg Oral BID    mirtazapine  7.5 mg Oral QHS     PRN Meds:benzonatate, diazePAM, lidocaine HCl 2%, morphine, ondansetron, ramelteon    Objective:     Vital Signs (Most Recent):  Temp: 98.4 °F (36.9 °C) (05/28/19 1212)  Pulse: 81 (05/28/19 1212)  Resp: 16 (05/28/19 1212)  BP: 131/82 (05/28/19 1212)  SpO2: 100 % (05/28/19 1212) Vital Signs (24h Range):  Temp:  [97.2 °F (36.2 °C)-98.8 °F (37.1 °C)] 98.4 °F (36.9 °C)  Pulse:  [72-81] 81  Resp:  [15-20] 16  SpO2:  [94 %-100 %] 100 %  BP: (107-131)/(57-82) 131/82     Weight: 52.4 kg (115 lb 8.3 oz)  Body mass index is 19.22 kg/m².    Review of Symptoms  Symptom Assessment (ESAS 0-10 scale)  ESAS 0 1 2 3 4 5 6 7 8 9 10   Pain           x   Dyspnea              Anxiety              Nausea              Depression               Anorexia              Fatigue              Insomnia              Restlessness               Agitation              CAM / Delirium __ --  ___+   Constipation     __ --  ___+   Diarrhea           __ --  ___+  Bowel Management Plan (BMP):     Comments:     Pain Assessment: 10/10    OME in 24 hours:     Performance Status: 80    ECOG Performance Status Grade: 1 - Ambulates, capable of light work    Physical Exam   Constitutional: She is oriented to person, place, and time. She appears well-developed. No distress.   Ill appearing   HENT:   Head: Normocephalic and atraumatic.   Eyes: Conjunctivae and EOM are normal.   Neck: Normal range of motion. Neck supple.   Pulmonary/Chest: Effort normal. No respiratory distress.   Abdominal: Soft.    Musculoskeletal: Normal range of motion.   Neurological: She is alert and oriented to person, place, and time.   Skin: Skin is warm and dry.   Psychiatric: She has a normal mood and affect. Her behavior is normal.       Significant Labs: All pertinent labs within the past 24 hours have been reviewed.  CBC:   Recent Labs   Lab 19   WBC 6.90   HGB 10.1*   HCT 31.4*   *        BMP:  Recent Labs   Lab 19   *      K 4.8      CO2 20*   BUN 12   CREATININE 0.7   CALCIUM 9.0     LFT:  Lab Results   Component Value Date    AST 12 2019    ALKPHOS 77 2019    BILITOT 0.5 2019     Albumin:   Albumin   Date Value Ref Range Status   2019 3.8 3.5 - 5.2 g/dL Final     Protein:   Total Protein   Date Value Ref Range Status   2019 8.1 6.0 - 8.4 g/dL Final     Lactic acid:   Lab Results   Component Value Date    LACTATE 0.9 2019    LACTATE 2.3 (H) 2019       Significant Imaging: None    Advanced Directives::  Living Will: No  LaPOST: No  Do Not Resuscitate Status: Yes  Medical Power of : Yes. Agent's Name: Geetha Garcia (). Agent's Contact Number: [phone]    Decision-Making Capacity: Patient answered questions    Living Arrangements: Lives in assisted living    Psychosocial/Cultural:  Patient's most important priorities:  Pain control. Returning home. Cares deeply about her cat Bon Appetite. She is not afraid of dying.    Patient's biggest concerns/fears:      Previous death/end of life care history:    recently.    Patient's goals/hopes:  She hopes to be able to return home.    Spiritual:     F- Tara and Belief:     I - Importance:   .  C - Community:     A - Address in Care:       > 50% of 45 min visit spent in chart review, face to face discussion of goals of care,  symptom assessment, coordination of care and emotional support.    Lissett Torres MD  Palliative Medicine  [facility]

## 2019-05-28 NOTE — SUBJECTIVE & OBJECTIVE
Interval History: No acute events overnight. Refused PEG. Believes she can eat if pain better controlled. Reports that pain medicine merely makes her sleepy.    Medications:  Continuous Infusions:   dextrose 5 % and 0.45 % NaCl 100 mL/hr at 05/28/19 0354     Scheduled Meds:   hydrocodone-apap 7.5-325 MG/15 ML  15 mL Oral Q4H While awake    levETIRAcetam  1,000 mg Oral BID    mirtazapine  7.5 mg Oral QHS     PRN Meds:benzonatate, diazePAM, lidocaine HCl 2%, morphine, ondansetron, ramelteon     Review of patient's allergies indicates:  No Known Allergies  Objective:     Vital Signs (24h Range):  Temp:  [97.2 °F (36.2 °C)-98.2 °F (36.8 °C)] 97.2 °F (36.2 °C)  Pulse:  [72-80] 72  Resp:  [16-20] 18  SpO2:  [94 %-100 %] 98 %  BP: (107-134)/(62-82) 107/62       Lines/Drains/Airways     Peripheral Intravenous Line                 Peripheral IV - Single Lumen 05/26/19 1938 20 G Left Forearm 1 day                Physical Exam     AAO, NAD  Fungating lesion of soft palate visible  No SOB  Tolerating secretions    Significant Labs:  CBC:   Recent Labs   Lab 05/28/19 0429   WBC 6.90   RBC 3.01*   HGB 10.1*   HCT 31.4*      *   MCH 33.6*   MCHC 32.2     CMP:   Recent Labs   Lab 05/26/19 1929 05/28/19 0429      < > 119*   CALCIUM 10.8*   < > 9.0   ALBUMIN 3.8  --   --    PROT 8.1  --   --       < > 136   K 5.7*   < > 4.8   CO2 16*   < > 20*      < > 109   BUN 31*   < > 12   CREATININE 1.1   < > 0.7   ALKPHOS 77  --   --    ALT 6*  --   --    AST 12  --   --    BILITOT 0.5  --   --     < > = values in this interval not displayed.       Significant Diagnostics:  None

## 2019-05-28 NOTE — PLAN OF CARE
Problem: Adult Inpatient Plan of Care  Goal: Plan of Care Review  Plan of care reviewed with pt/verbalized understanding, patient ambulatory to bathroom with assistance, patient c/o of pain to ear radiating to throat pain relieved with combination of po and iv pain medications, patient able to answer questions appropriately without indication of dementia this shift, call-light within reach, will continue to monitor.

## 2019-05-28 NOTE — PROGRESS NOTES
[facility]  Otorhinolaryngology-Head & Neck Surgery  Progress Note    Subjective:     Post-Op Info:  Procedure(s) (LRB):  EGD, WITH PEG TUBE INSERTION (N/A)      Hospital Day: 3     Interval History: No acute events overnight. Refused PEG. Believes she can eat if pain better controlled. Reports that pain medicine merely makes her sleepy.    Medications:  Continuous Infusions:   dextrose 5 % and 0.45 % NaCl 100 mL/hr at 05/28/19 0354     Scheduled Meds:   hydrocodone-apap 7.5-325 MG/15 ML  15 mL Oral Q4H While awake    levETIRAcetam  1,000 mg Oral BID    mirtazapine  7.5 mg Oral QHS     PRN Meds:benzonatate, diazePAM, lidocaine HCl 2%, morphine, ondansetron, ramelteon     Review of patient's allergies indicates:  No Known Allergies  Objective:     Vital Signs (24h Range):  Temp:  [97.2 °F (36.2 °C)-98.2 °F (36.8 °C)] 97.2 °F (36.2 °C)  Pulse:  [72-80] 72  Resp:  [16-20] 18  SpO2:  [94 %-100 %] 98 %  BP: (107-134)/(62-82) 107/62       Lines/Drains/Airways     Peripheral Intravenous Line                 Peripheral IV - Single Lumen 05/26/19 1938 20 G Left Forearm 1 day                Physical Exam     AAO, NAD  Fungating lesion of soft palate visible  No SOB  Tolerating secretions    Significant Labs:  CBC:   Recent Labs   Lab 05/28/19 0429   WBC 6.90   RBC 3.01*   HGB 10.1*   HCT 31.4*      *   MCH 33.6*   MCHC 32.2     CMP:   Recent Labs   Lab 05/26/19  1929 05/28/19  0429      < > 119*   CALCIUM 10.8*   < > 9.0   ALBUMIN 3.8  --   --    PROT 8.1  --   --       < > 136   K 5.7*   < > 4.8   CO2 16*   < > 20*      < > 109   BUN 31*   < > 12   CREATININE 1.1   < > 0.7   ALKPHOS 77  --   --    ALT 6*  --   --    AST 12  --   --    BILITOT 0.5  --   --     < > = values in this interval not displayed.       Significant Diagnostics:  None    Assessment/Plan:     * Squamous cell carcinoma of soft palate  63 yo woman with scca of soft palate, here with inability to  tolerate PO, pain control    -continue IV fluids today  -start dysphagia diet  -hycet, morphine for pain control - consult to palliative care for assistance with pain control, will discuss further recs  -zofran  -tessalon purls for cough  -heme/onc recommends outpatient chemo/RT for treatment  -MRI/ultrasound of liver negative for mass  -discharge once pain controlled if tolerating PO        Angelika Lopez-MD Costa  Otorhinolaryngology-Head & Neck Surgery  [facility]

## 2019-05-28 NOTE — SUBJECTIVE & OBJECTIVE
Interval History: Reports being in severe pain this morning. Pain extends from left ear down to left side of throat. Pain is constant. There is also tenderness to palpation in the region. Not worsened by chewing. Pain is worse when she swallows and is unable to open her mouth fully. Says her pain is not well controlled. She is upset this morning because she claims to have called her nurse 4 times for assistance to got the bathroom before he finally showed up and she ended up soiling the floor. On re-evaluation this afternoon pt found to be in better spirits without any pain.    Medications:  Continuous Infusions:   dextrose 5 % and 0.45 % NaCl 100 mL/hr at 05/28/19 0354     Scheduled Meds:   gabapentin  300 mg Oral TID    hydrocodone-apap 7.5-325 MG/15 ML  15 mL Oral Q4H While awake    levETIRAcetam  1,000 mg Oral BID    mirtazapine  7.5 mg Oral QHS     PRN Meds:benzonatate, diazePAM, lidocaine HCl 2%, morphine, ondansetron, ramelteon    Objective:     Vital Signs (Most Recent):  Temp: 98.4 °F (36.9 °C) (05/28/19 1212)  Pulse: 81 (05/28/19 1212)  Resp: 16 (05/28/19 1212)  BP: 131/82 (05/28/19 1212)  SpO2: 100 % (05/28/19 1212) Vital Signs (24h Range):  Temp:  [97.2 °F (36.2 °C)-98.8 °F (37.1 °C)] 98.4 °F (36.9 °C)  Pulse:  [72-81] 81  Resp:  [15-20] 16  SpO2:  [94 %-100 %] 100 %  BP: (107-131)/(57-82) 131/82     Weight: 52.4 kg (115 lb 8.3 oz)  Body mass index is 19.22 kg/m².    Review of Symptoms  Symptom Assessment (ESAS 0-10 scale)  ESAS 0 1 2 3 4 5 6 7 8 9 10   Pain           x   Dyspnea              Anxiety              Nausea              Depression               Anorexia              Fatigue              Insomnia              Restlessness               Agitation              CAM / Delirium __ --  ___+   Constipation     __ --  ___+   Diarrhea           __ --  ___+  Bowel Management Plan (BMP):     Comments:     Pain Assessment: 10/10    OME in 24 hours:     Performance Status: 80    ECOG Performance  Status Grade: 1 - Ambulates, capable of light work    Physical Exam   Constitutional: She is oriented to person, place, and time. She appears well-developed. No distress.   Ill appearing   HENT:   Head: Normocephalic and atraumatic.   Eyes: Conjunctivae and EOM are normal.   Neck: Normal range of motion. Neck supple.   Pulmonary/Chest: Effort normal. No respiratory distress.   Abdominal: Soft.   Musculoskeletal: Normal range of motion.   Neurological: She is alert and oriented to person, place, and time.   Skin: Skin is warm and dry.   Psychiatric: She has a normal mood and affect. Her behavior is normal.       Significant Labs: All pertinent labs within the past 24 hours have been reviewed.  CBC:   Recent Labs   Lab 19   WBC 6.90   HGB 10.1*   HCT 31.4*   *        BMP:  Recent Labs   Lab 19   *      K 4.8      CO2 20*   BUN 12   CREATININE 0.7   CALCIUM 9.0     LFT:  Lab Results   Component Value Date    AST 12 2019    ALKPHOS 77 2019    BILITOT 0.5 2019     Albumin:   Albumin   Date Value Ref Range Status   2019 3.8 3.5 - 5.2 g/dL Final     Protein:   Total Protein   Date Value Ref Range Status   2019 8.1 6.0 - 8.4 g/dL Final     Lactic acid:   Lab Results   Component Value Date    LACTATE 0.9 2019    LACTATE 2.3 (H) 2019       Significant Imaging: None    Advanced Directives::  Living Will: No  LaPOST: No  Do Not Resuscitate Status: Yes  Medical Power of : Yes. Agent's Name: Geetha Garcia (). Agent's Contact Number: [phone]    Decision-Making Capacity: Patient answered questions    Living Arrangements: Lives in assisted living    Psychosocial/Cultural:  Patient's most important priorities:  Pain control. Returning home. Cares deeply about her cat Bon Appetite. She is not afraid of dying.    Patient's biggest concerns/fears:      Previous death/end of life care history:     recently.    Patient's goals/hopes:  She hopes to be able to return home.    Spiritual:     F- Tara and Belief:     I - Importance:   .  C - Community:     A - Address in Care:

## 2019-05-28 NOTE — PLAN OF CARE
Problem: Adult Inpatient Plan of Care  Goal: Plan of Care Review  Outcome: Ongoing (interventions implemented as appropriate)  Plan of care reviewed with patient.  verbalized understanding. VSS on room air. AAOX4 odd behavior at times. Remains free of falls and injury. Patient up with assist. Complains of left ear plan that radiates down check.Pain controlled with PRN medications per MAR. . Bed in lowest position, call light within reach, TM

## 2019-05-29 VITALS
DIASTOLIC BLOOD PRESSURE: 60 MMHG | OXYGEN SATURATION: 95 % | BODY MASS INDEX: 19.22 KG/M2 | TEMPERATURE: 99 F | WEIGHT: 115.5 LBS | SYSTOLIC BLOOD PRESSURE: 103 MMHG | HEART RATE: 73 BPM | RESPIRATION RATE: 16 BRPM

## 2019-05-29 LAB
ANION GAP SERPL CALC-SCNC: 9 MMOL/L (ref 8–16)
BASOPHILS # BLD AUTO: 0.01 K/UL (ref 0–0.2)
BASOPHILS NFR BLD: 0.2 % (ref 0–1.9)
BUN SERPL-MCNC: 7 MG/DL (ref 8–23)
CALCIUM SERPL-MCNC: 8 MG/DL (ref 8.7–10.5)
CHLORIDE SERPL-SCNC: 109 MMOL/L (ref 95–110)
CO2 SERPL-SCNC: 17 MMOL/L (ref 23–29)
CREAT SERPL-MCNC: 0.7 MG/DL (ref 0.5–1.4)
DIFFERENTIAL METHOD: ABNORMAL
EOSINOPHIL # BLD AUTO: 0 K/UL (ref 0–0.5)
EOSINOPHIL NFR BLD: 0 % (ref 0–8)
ERYTHROCYTE [DISTWIDTH] IN BLOOD BY AUTOMATED COUNT: 11.7 % (ref 11.5–14.5)
EST. GFR  (AFRICAN AMERICAN): >60 ML/MIN/1.73 M^2
EST. GFR  (NON AFRICAN AMERICAN): >60 ML/MIN/1.73 M^2
GLUCOSE SERPL-MCNC: 146 MG/DL (ref 70–110)
HCT VFR BLD AUTO: 28.9 % (ref 37–48.5)
HGB BLD-MCNC: 9.2 G/DL (ref 12–16)
IMM GRANULOCYTES # BLD AUTO: 0.02 K/UL (ref 0–0.04)
IMM GRANULOCYTES NFR BLD AUTO: 0.4 % (ref 0–0.5)
LYMPHOCYTES # BLD AUTO: 0.5 K/UL (ref 1–4.8)
LYMPHOCYTES NFR BLD: 9.2 % (ref 18–48)
MCH RBC QN AUTO: 33.8 PG (ref 27–31)
MCHC RBC AUTO-ENTMCNC: 31.8 G/DL (ref 32–36)
MCV RBC AUTO: 106 FL (ref 82–98)
MONOCYTES # BLD AUTO: 0.1 K/UL (ref 0.3–1)
MONOCYTES NFR BLD: 2.4 % (ref 4–15)
NEUTROPHILS # BLD AUTO: 4.7 K/UL (ref 1.8–7.7)
NEUTROPHILS NFR BLD: 87.8 % (ref 38–73)
NRBC BLD-RTO: 0 /100 WBC
PLATELET # BLD AUTO: 221 K/UL (ref 150–350)
PMV BLD AUTO: 11.1 FL (ref 9.2–12.9)
POTASSIUM SERPL-SCNC: 4.6 MMOL/L (ref 3.5–5.1)
RBC # BLD AUTO: 2.72 M/UL (ref 4–5.4)
SODIUM SERPL-SCNC: 135 MMOL/L (ref 136–145)
WBC # BLD AUTO: 5.35 K/UL (ref 3.9–12.7)

## 2019-05-29 PROCEDURE — 25000003 PHARM REV CODE 250: Mod: HCNC

## 2019-05-29 PROCEDURE — 25000003 PHARM REV CODE 250: Mod: HCNC | Performed by: INTERNAL MEDICINE

## 2019-05-29 PROCEDURE — 85025 COMPLETE CBC W/AUTO DIFF WBC: CPT | Mod: HCNC

## 2019-05-29 PROCEDURE — 25000003 PHARM REV CODE 250: Mod: HCNC | Performed by: OTOLARYNGOLOGY

## 2019-05-29 PROCEDURE — 80048 BASIC METABOLIC PNL TOTAL CA: CPT | Mod: HCNC

## 2019-05-29 PROCEDURE — 36415 COLL VENOUS BLD VENIPUNCTURE: CPT | Mod: HCNC

## 2019-05-29 RX ORDER — LIDOCAINE HYDROCHLORIDE 20 MG/ML
5 SOLUTION OROPHARYNGEAL EVERY 4 HOURS PRN
Qty: 100 ML | Refills: 3 | Status: SHIPPED | OUTPATIENT
Start: 2019-05-29

## 2019-05-29 RX ORDER — TRIPROLIDINE/PSEUDOEPHEDRINE 2.5MG-60MG
600 TABLET ORAL 3 TIMES DAILY
Qty: 473 ML | Refills: 0 | Status: SHIPPED | OUTPATIENT
Start: 2019-05-29

## 2019-05-29 RX ORDER — OXYCODONE HCL 5 MG/5 ML
7.5 SOLUTION, ORAL ORAL EVERY 4 HOURS PRN
Qty: 473 ML | Refills: 0 | Status: SHIPPED | OUTPATIENT
Start: 2019-05-29 | End: 2019-05-31 | Stop reason: SDUPTHER

## 2019-05-29 RX ORDER — HYDROCODONE BITARTRATE AND ACETAMINOPHEN 7.5; 325 MG/15ML; MG/15ML
15 SOLUTION ORAL
Qty: 473 ML | Refills: 0 | Status: SHIPPED | OUTPATIENT
Start: 2019-05-29 | End: 2019-05-29 | Stop reason: HOSPADM

## 2019-05-29 RX ORDER — BENZONATATE 100 MG/1
100 CAPSULE ORAL 3 TIMES DAILY PRN
Qty: 30 CAPSULE | Refills: 3 | Status: SHIPPED | OUTPATIENT
Start: 2019-05-29 | End: 2019-06-08

## 2019-05-29 RX ORDER — DIAZEPAM 2 MG/1
2 TABLET ORAL EVERY 12 HOURS PRN
Qty: 30 TABLET | Refills: 0 | Status: SHIPPED | OUTPATIENT
Start: 2019-05-29 | End: 2019-06-28

## 2019-05-29 RX ORDER — MIRTAZAPINE 7.5 MG/1
7.5 TABLET, FILM COATED ORAL NIGHTLY
Qty: 30 TABLET | Refills: 11 | Status: SHIPPED | OUTPATIENT
Start: 2019-05-29 | End: 2020-05-28

## 2019-05-29 RX ORDER — GABAPENTIN 300 MG/1
300 CAPSULE ORAL 3 TIMES DAILY
Qty: 90 CAPSULE | Refills: 11 | Status: SHIPPED | OUTPATIENT
Start: 2019-05-29 | End: 2020-05-28

## 2019-05-29 RX ADMIN — LEVETIRACETAM 1000 MG: 500 TABLET ORAL at 08:05

## 2019-05-29 RX ADMIN — HYDROCODONE BITARTRATE AND ACETAMINOPHEN 15 ML: 7.5; 325 SOLUTION ORAL at 06:05

## 2019-05-29 RX ADMIN — GABAPENTIN 300 MG: 300 CAPSULE ORAL at 08:05

## 2019-05-29 RX ADMIN — IBUPROFEN 600 MG: 100 SUSPENSION ORAL at 08:05

## 2019-05-29 RX ADMIN — LIDOCAINE HYDROCHLORIDE 5 ML: 20 SOLUTION ORAL; TOPICAL at 08:05

## 2019-05-29 NOTE — PHYSICIAN QUERY
PT Name: Breonna Silva  MR #: [id number]    Physician Query Form - Nutrition Clarification     CDS: Anila Hartman RN  Contact information: [email or web address]    This form is a permanent document in the medical record.     Query Date: May 29, 2019    By submitting this query, we are merely seeking further clarification of documentation.. Please utilize your independent clinical judgment when addressing the question(s) below.    The Medical record contains the following:   Indicators  Supporting Clinical Findings Location in Medical Record    % of Estimated Energy Intake over a time frame from p.o., TF, or TPN      Weight Status over a time frame     x Subcutaneous Fat and/or Muscle Loss Chronically ill-appearing, cachectic / communicating. H&P 5/26    Fluid Accumulation or Edema      Reduced  Strength     x Wt / BMI / Usual Body Weight Weight: 52.4 kg (115 lb 8.3 oz)    Body mass index is 19.22 kg/m².      Positive for malaise/fatigue and weight loss . H&P 5/26        Heme onc consult 5/27    Delayed Wound Healing / Failure to Thrive     x Acute or Chronic Illness Squamous cell carcinoma of soft palate   65 yo woman with scca of soft palate, here with inability to tolerate PO, pain control H&P 5/26   x Medication Remeron SolTab 7.5 mg nightly for sleep, mood, appetite Psych consult 5/27    Treatment      Other       AND / ASPEN Clinical Characteristics (October 2011)  A minimum of two characteristics is recommended for diagnosing either moderate or severe malnutrition   Mild Malnutrition Moderate Malnutrition Severe Malnutrition   Energy Intake from p.o., TF or TPN. < 75% intake of estimated energy needs for less than 7 days < 75% intake of estimated energy needs for greater than 7 days < 50% intake of estimated energy needs for > 5 days   Weight Loss 1-2% in 1 month  5% in 3 months  7.5% in 6 months  10% in 1 year 1-2 % in 1 week  5% in 1 month  7.5% in 3 months  10% in 6 months  20% in 1 year > 2% in 1  week  > 5% in 1 month  > 7.5% in 3 months  > 10% in 6 months  > 20% in 1 year   Physical Findings     None *Mild subcutaneous fat and/or muscle loss  *Mild fluid accumulation  *Stage II decubitus  *Surgical wound or non-healing wound *Mod/severe subcutaneous fat and/or muscle loss  *Mod/severe fluid accumulation  *Stage III or IV decubitus  *Non-healing surgical wound     Provider, please specify diagnosis or diagnoses associated with above clinical findings.    [  ] Mild Protein-Calorie Malnutrition   [x  ] Cachexia   [  ] Abnormal Weight Loss   [  ] Other Nutritional Diagnosis (please specify):    [  ] Other:    [  ] Clinically Undetermined     Please document in your progress notes daily for the duration of treatment until resolved and include in your discharge summary.

## 2019-05-29 NOTE — PLAN OF CARE
Patient discharged home today with [facility] to her assisted living facility, [facility] ([phone]).  Patient will not have Rad/Onc or Hem/Onc follow up appointments, patient declined both, spoke with Dr Tom Bar.  Picked up patient prescriptions, placed them in her brown tote bag in the [employer] transportation car.  Notified Carmencita (manager at the assisted living facility) that patient was en route to facility and prescriptions were in her bag for Elizabeth, nurse at facility to go over.      Future Appointments   Date Time Provider Department Center   6/6/2019 10:30 AM Carmencita Alatorre NP [facility] [address]        05/29/19 5722   Final Note   Assessment Type Final Discharge Note   Anticipated Discharge Disposition   ([facility])   What phone number can be called within the next 1-3 days to see how you are doing after discharge?   ([phone], discharged with Palliative Care)   Hospital Follow Up  Appt(s) scheduled? Yes   [facility] Referral Info   Post Acute Recommendation Other  (Palliative Care)

## 2019-05-29 NOTE — DISCHARGE SUMMARY
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 She was seen in the clinic last week where it was encouraged that she be admitted for likely PEG tube placement, hydration, and pain control, but she refused, citing a need to get her affairs in order. She is now unable to tolerate the pain, and taking almost nothing by mouth.   Of note, on recent PET she has hypermetabolic lesions of the soft palate, cervical lymph nodes, liver, and iliac crest.      Procedure(s) (LRB):  EGD, WITH PEG TUBE INSERTION (N/A)     Hospital Course: Patient with above history admitted for pain control, inability to tolerate PO. On HOD#1 several services were consulted - patient refused a PEG tube. SHe has medical decision making capacity per psychiatry. She is still deciding on whether or not she will pursue further treatment, but heme/onc recommending chemo/xrt as outpatient. On HOD#3 she reported that her pain was under control and she was tolerating PO. Palliative care assisting with pain control and advance directives. Discharged home with follow up with ENT, heme/onc, rad/onc, palliative medicine.    Consults:   Consults (From admission, onward)        Status Ordering Provider     Inpatient consult to ENT  Once     Provider:  (Not yet assigned)    Completed ARIADNE SIFUENTES     Inpatient consult to General Surgery  Once     Provider:  (Not yet assigned)    Completed JUANITA MESSER     Inpatient consult to Hematology/Oncology   Once     Provider:  (Not yet assigned)    Completed JUANITA MARROQUIN     Inpatient consult to Hematology/Oncology Psychology  Once     Provider:  (Not yet assigned)    Completed JUANITA MARROQUIN     Inpatient consult to Palliative Care  Once     Provider:  (Not yet assigned)    Completed JUANITA MARROQUIN     Inpatient consult to Psychiatry  Once     Provider:  (Not yet assigned)    Completed JOCELYN ANTHONY     Inpatient consult to Radiation Oncology  Once     Provider:  (Not yet assigned)    Completed JUANITA MARROQUIN          Significant Diagnostic Studies: none    Pending Diagnostic Studies:     None        Final Active Diagnoses:    Diagnosis Date Noted POA    PRINCIPAL PROBLEM:  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    Specialty:  Hospice and Palliative Medicine  Contact information:  [address]  [facility] [id number] [phone]             Jocelyn Anthony NP In 1 week.    Specialty:  Otolaryngology  Contact information:  [address] [facility] [address] [facility] [id number] [phone]             Diana Espinosa MD In 1 week.    Specialties:  Hematology and Oncology, Hematology  Contact information:  [address] [facility] [id number] [phone]             Tom Bar Jr, MD In 1 week.    Specialty:  Radiation Oncology  Contact information:  [address]  [place] [id number] [phone]                 Patient Instructions:      Ambulatory referral to Palliative Care - [facility]   Referral Priority: Routine Referral Type: Consultation   Referred to  Provider: [facility] Requested Specialty: Hospice and Palliative Medicine   Number of Visits Requested: 1     Ambulatory Referral to Outpatient Case Management   Referral Priority: Routine Referral Type: Consultation   Referral Reason: Specialty Services Required   Number of Visits Requested: 1     Diet Adult Regular   Order Comments: Soft diet, advance as tolerated     Notify your health care provider if you experience any of the following:  temperature >100.4     Notify your health care provider if you experience any of the following:  severe uncontrolled pain     Notify your health care provider if you experience any of the following:  difficulty breathing or increased cough     No dressing needed     Activity as tolerated     Medications:  Reconciled Home Medications:      Medication List      START taking these medications    benzonatate 100 MG capsule  Commonly known as:  TESSALON  Take 1 capsule (100 mg total) by mouth 3 (three) times daily as needed for Cough.     hydrocodone-apap 7.5-325 MG/15 ML oral solution  Commonly known as:  HYCET  Take 15 mLs by mouth every 4 (four) hours while awake.  Replaces:  HYDROcodone-acetaminophen  mg per tablet     ibuprofen 100 mg/5 mL suspension  Commonly known as:  ADVIL,MOTRIN  Take 30 mLs (600 mg total) by mouth 3 (three) times daily.     lidocaine HCl 2% 2 % Soln  Commonly known as:  XYLOCAINE  5 mLs by Mucous Membrane route every 4 (four) hours as needed (throat pain).     mirtazapine 7.5 MG Tab  Commonly known as:  REMERON  Take 1 tablet (7.5 mg total) by mouth every evening.        CHANGE how you take these medications    * diazePAM 2 MG tablet  Commonly known as:  VALIUM  TAKE 1 TABLET BY MOUTH 3 TIMES A DAY AS NEEDED FOR ANXIETY  What changed:  Another medication with the same name was added. Make sure you understand how and when to take each.     * diazePAM 2 MG tablet  Commonly known as:  VALIUM  Take 1 tablet (2 mg  total) by mouth every 12 (twelve) hours as needed for Anxiety.  What changed:  You were already taking a medication with the same name, and this prescription was added. Make sure you understand how and when to take each.     * gabapentin 300 MG capsule  Commonly known as:  NEURONTIN  TAKE 1 CAPSULE (300 MG TOTAL) BY MOUTH EVERY EVENING.  What changed:  Another medication with the same name was added. Make sure you understand how and when to take each.     * gabapentin 300 MG capsule  Commonly known as:  NEURONTIN  Take 1 capsule (300 mg total) by mouth 3 (three) times daily.  What changed:  You were already taking a medication with the same name, and this prescription was added. Make sure you understand how and when to take each.         * This list has 4 medication(s) that are the same as other medications prescribed for you. Read the directions carefully, and ask your doctor or other care provider to review them with you.            CONTINUE taking these medications    b complex vitamins tablet  Take 1 tablet by mouth once daily.     folic acid 800 MCG Tab  Commonly known as:  FOLVITE  Take 800 mcg by mouth once daily.     levETIRAcetam 1000 MG tablet  Commonly known as:  KEPPRA  TAKE 1 TABLET BY MOUTH TWICE A DAY     losartan 25 MG tablet  Commonly known as:  COZAAR  Take 1 tablet (25 mg total) by mouth once daily.     meclizine 25 mg tablet  Commonly known as:  ANTIVERT  Take 1 tablet (25 mg total) by mouth 3 (three) times daily as needed for Dizziness.     melatonin 5 mg Tab  Take 1 tablet (5 mg total) by mouth nightly as needed.     oxyCODONE-acetaminophen  mg per tablet  Commonly known as:  PERCOCET  Take 1 tablet by mouth every 4 (four) hours as needed for Pain.     VITAMIN B-12 1000 MCG tablet  Generic drug:  cyanocobalamin  Take 100 mcg by mouth once daily.        STOP taking these medications    HYDROcodone-acetaminophen  mg per tablet  Commonly known as:  NORCO  Replaced by:  hydrocodone-apap  7.5-325 MG/15 ML oral solution     potassium chloride SA 20 MEQ tablet  Commonly known as:  ESTRELLITA-DUR,KLOR-JEAN Messer MD  Otorhinolaryngology-Head & Neck Surgery  [facility]

## 2019-05-29 NOTE — PLAN OF CARE
Problem: Adult Inpatient Plan of Care  Goal: Plan of Care Review  Plan of care reviewed with pt/verbalized understanding, vss, patient c/o of pain to throat relieved by po pain medications, patient up to bathroom with assistance, patient tolerating diet with no c/o of nausea/vomiting, call-light within reach, will continue to monitor.

## 2019-05-29 NOTE — NURSING
Plan of care reviewed with patient and discharge instructions reviewed verbalized understanding. Left via wheelchair.

## 2019-05-29 NOTE — SUBJECTIVE & OBJECTIVE
Interval History: NAEON. Pain well controlled with current regimen. Ready to go home.    Medications:  Continuous Infusions:   dextrose 5 % and 0.45 % NaCl 100 mL/hr at 05/28/19 1508     Scheduled Meds:   gabapentin  300 mg Oral TID    hydrocodone-apap 7.5-325 MG/15 ML  15 mL Oral Q4H While awake    ibuprofen  600 mg Oral TID    levETIRAcetam  1,000 mg Oral BID    mirtazapine  7.5 mg Oral QHS     PRN Meds:acetaminophen, benzonatate, diazePAM, lidocaine HCl 2%, morphine, ondansetron, ramelteon     Review of patient's allergies indicates:  No Known Allergies  Objective:     Vital Signs (24h Range):  Temp:  [97.5 °F (36.4 °C)-99 °F (37.2 °C)] 98.8 °F (37.1 °C)  Pulse:  [] 73  Resp:  [16-18] 16  SpO2:  [95 %-100 %] 95 %  BP: (100-139)/(60-82) 103/60       Lines/Drains/Airways     Peripheral Intravenous Line                 Peripheral IV - Single Lumen 05/26/19 1938 20 G Left Forearm 2 days                Physical Exam     AAO, NAD  Fungating lesion of soft palate visible  No SOB  Tolerating secretions    Significant Labs:  CBC:   Recent Labs   Lab 05/29/19  0425   WBC 5.35   RBC 2.72*   HGB 9.2*   HCT 28.9*      *   MCH 33.8*   MCHC 31.8*     CMP:   Recent Labs   Lab 05/26/19  1929 05/29/19  0425      < > 146*   CALCIUM 10.8*   < > 8.0*   ALBUMIN 3.8  --   --    PROT 8.1  --   --       < > 135*   K 5.7*   < > 4.6   CO2 16*   < > 17*      < > 109   BUN 31*   < > 7*   CREATININE 1.1   < > 0.7   ALKPHOS 77  --   --    ALT 6*  --   --    AST 12  --   --    BILITOT 0.5  --   --     < > = values in this interval not displayed.       Significant Diagnostics:  None

## 2019-05-29 NOTE — PLAN OF CARE
CM informed jake pt needs ride home. JAKE ordered  for 2pm through the [facility].    Terra Kaur LCSW [phone]

## 2019-05-29 NOTE — PROGRESS NOTES
[facility]  Otorhinolaryngology-Head & Neck Surgery  Progress Note    Subjective:     Post-Op Info:  Procedure(s) (LRB):  EGD, WITH PEG TUBE INSERTION (N/A)      Hospital Day: 4     Interval History: NAEON. Pain well controlled with current regimen. Ready to go home.    Medications:  Continuous Infusions:   dextrose 5 % and 0.45 % NaCl 100 mL/hr at 05/28/19 1508     Scheduled Meds:   gabapentin  300 mg Oral TID    hydrocodone-apap 7.5-325 MG/15 ML  15 mL Oral Q4H While awake    ibuprofen  600 mg Oral TID    levETIRAcetam  1,000 mg Oral BID    mirtazapine  7.5 mg Oral QHS     PRN Meds:acetaminophen, benzonatate, diazePAM, lidocaine HCl 2%, morphine, ondansetron, ramelteon     Review of patient's allergies indicates:  No Known Allergies  Objective:     Vital Signs (24h Range):  Temp:  [97.5 °F (36.4 °C)-99 °F (37.2 °C)] 98.8 °F (37.1 °C)  Pulse:  [] 73  Resp:  [16-18] 16  SpO2:  [95 %-100 %] 95 %  BP: (100-139)/(60-82) 103/60       Lines/Drains/Airways     Peripheral Intravenous Line                 Peripheral IV - Single Lumen 05/26/19 1938 20 G Left Forearm 2 days                Physical Exam     AAO, NAD  Fungating lesion of soft palate visible  No SOB  Tolerating secretions    Significant Labs:  CBC:   Recent Labs   Lab 05/29/19  0425   WBC 5.35   RBC 2.72*   HGB 9.2*   HCT 28.9*      *   MCH 33.8*   MCHC 31.8*     CMP:   Recent Labs   Lab 05/26/19  1929 05/29/19  0425      < > 146*   CALCIUM 10.8*   < > 8.0*   ALBUMIN 3.8  --   --    PROT 8.1  --   --       < > 135*   K 5.7*   < > 4.6   CO2 16*   < > 17*      < > 109   BUN 31*   < > 7*   CREATININE 1.1   < > 0.7   ALKPHOS 77  --   --    ALT 6*  --   --    AST 12  --   --    BILITOT 0.5  --   --     < > = values in this interval not displayed.       Significant Diagnostics:  None    Assessment/Plan:     * Squamous cell carcinoma of soft palate  63 yo woman with scca of soft palate, here with  inability to tolerate PO, pain control    -dysphagia diet  -hycet, viscous lidocaine, gabapentin for pain control -appreciate palliative recs  -zofran  -tessalon purls for cough  -heme/onc recommends outpatient chemo/RT for treatment  -MRI/ultrasound of liver negative for mass  -discharge home today        Angelika Messer MD  Otorhinolaryngology-Head & Neck Surgery  [facility]

## 2019-05-29 NOTE — ASSESSMENT & PLAN NOTE
63 yo woman with scca of soft palate, here with inability to tolerate PO, pain control    -dysphagia diet  -hycet, viscous lidocaine, gabapentin for pain control -appreciate palliative recs  -zofran  -tessalon purls for cough  -heme/onc recommends outpatient chemo/RT for treatment  -MRI/ultrasound of liver negative for mass  -discharge home today

## 2019-05-31 ENCOUNTER — TELEPHONE (OUTPATIENT)
Dept: OTOLARYNGOLOGY | Facility: CLINIC | Age: 64
End: 2019-05-31

## 2019-05-31 ENCOUNTER — TELEPHONE (OUTPATIENT)
Dept: HEMATOLOGY/ONCOLOGY | Facility: CLINIC | Age: 64
End: 2019-05-31

## 2019-05-31 DIAGNOSIS — C05.1 SQUAMOUS CELL CARCINOMA OF SOFT PALATE: Primary | ICD-10-CM

## 2019-05-31 RX ORDER — OXYCODONE HCL 5 MG/5 ML
10 SOLUTION, ORAL ORAL EVERY 4 HOURS PRN
Qty: 473 ML | Refills: 0 | Status: SHIPPED | OUTPATIENT
Start: 2019-05-31

## 2019-05-31 NOTE — TELEPHONE ENCOUNTER
----- Message from Arianna Saul sent at 5/31/2019  1:42 PM CDT -----  Contact: Pt   Pt is requesting a call back in regards to advice on her severe headaches and also medication that she might need called in. Pt is very concerned and would like to speak with someone at their earliest convenience.       Pt can be contacted at [phone]

## 2019-05-31 NOTE — TELEPHONE ENCOUNTER
----- Message from Arianna Saul sent at 5/31/2019  1:42 PM CDT -----  Contact: Pt   Pt is requesting a call back in regards to advice on her severe headaches and also medication that she might need called in. Pt is very concerned and would like to speak with someone at their earliest convenience.       Pt can be contacted at [phone]

## 2019-05-31 NOTE — TELEPHONE ENCOUNTER
----- Message from Arianna Saul sent at 5/31/2019  1:42 PM CDT -----  Contact: Pt   Pt is requesting a call back in regards to advice on her severe headaches and also medication that she might need called in. Pt is very concerned and would like to speak with someone at their earliest convenience.       Pt can be contacted at [phone]

## 2019-05-31 NOTE — TELEPHONE ENCOUNTER
Called patient to schedule appointment with Dr Calle.  She wanted a pm appointment. Informed her she only has am appointments.        ----- Message from Светлана Robbins RN sent at 5/31/2019  4:13 PM CDT -----  I have included the  Ms. Rodriguez she will be able to assist you.  Светлана  ----- Message -----  From: Carmencita Alatorre NP  Sent: 5/31/2019   3:21 PM  To: Светлана Robbins RN    Can you please help her get in with palliative care? She is refusing chemo radiation and in a lot of pain.    Thanks!

## 2019-05-31 NOTE — TELEPHONE ENCOUNTER
Called and spoke with Elizabeth RN at [facility] in the [place] about patient's threat to drink antifreeze. Elizabeth states they are aware of patient's threats and that they are checking in with her frequently. She states they moved Ms. Mulligan's room closer to the  several days ago. I will reach out to our  to assist with palliative care referral.

## 2019-06-01 DIAGNOSIS — C05.1 SQUAMOUS CELL CARCINOMA OF SOFT PALATE: ICD-10-CM

## 2019-06-01 RX ORDER — OXYCODONE HYDROCHLORIDE 5 MG/1
5 TABLET ORAL EVERY 4 HOURS PRN
Qty: 10 TABLET | Refills: 0 | Status: SHIPPED | OUTPATIENT
Start: 2019-06-01 | End: 2019-06-03 | Stop reason: SDUPTHER

## 2019-06-03 ENCOUNTER — TELEPHONE (OUTPATIENT)
Dept: OTOLARYNGOLOGY | Facility: CLINIC | Age: 64
End: 2019-06-03

## 2019-06-03 ENCOUNTER — TELEPHONE (OUTPATIENT)
Dept: INTERNAL MEDICINE | Facility: CLINIC | Age: 64
End: 2019-06-03

## 2019-06-03 DIAGNOSIS — C05.1 SQUAMOUS CELL CARCINOMA OF SOFT PALATE: Primary | ICD-10-CM

## 2019-06-03 RX ORDER — OXYCODONE HYDROCHLORIDE 5 MG/1
5 TABLET ORAL EVERY 4 HOURS PRN
Qty: 50 TABLET | Refills: 0 | Status: SHIPPED | OUTPATIENT
Start: 2019-06-03

## 2019-06-03 NOTE — TELEPHONE ENCOUNTER
I spoke with Ms. Silva. She was complaining of pain and unable to  pain medication from pharmacy all weekend. I reached out to [facility] and followed up with Ms. Silva. A new script was electronically sent in and pharmacy confirmed they have the medication available. Pt advised that she can go  form pharmacy and we plan to see her at her follow up appt this Thur. I will reach out to  regarding arranging a Taxi service to bring her to appt. Pt verbalized understanding.

## 2019-06-05 ENCOUNTER — TELEPHONE (OUTPATIENT)
Dept: HEMATOLOGY/ONCOLOGY | Facility: CLINIC | Age: 64
End: 2019-06-05

## 2019-06-05 ENCOUNTER — TELEPHONE (OUTPATIENT)
Dept: INFUSION THERAPY | Facility: HOSPITAL | Age: 64
End: 2019-06-05

## 2019-06-05 NOTE — TELEPHONE ENCOUNTER
Pt would like a later appointment. I explained to the pt that Dr. Calle is only in clinic on Friday mornings. Pt also expressed that she has an appointment on 6/6 and did not want to come two days in a row. I asked Rayne to step in as patient was getting upset. Also reached out to Dr. Calle and Dr. Calle will give pt a call.

## 2019-06-05 NOTE — TELEPHONE ENCOUNTER
Tried calling the patient again to confirm her appointment on 6/6 so that Dr. Calle can come over and she her, but did not get an answer. Left voicemail.

## 2019-06-06 ENCOUNTER — OUTPATIENT CASE MANAGEMENT (OUTPATIENT)
Dept: ADMINISTRATIVE | Facility: OTHER | Age: 64
End: 2019-06-06

## 2019-06-06 ENCOUNTER — NURSE TRIAGE (OUTPATIENT)
Dept: ADMINISTRATIVE | Facility: CLINIC | Age: 64
End: 2019-06-06

## 2019-06-06 ENCOUNTER — TELEPHONE (OUTPATIENT)
Dept: OTOLARYNGOLOGY | Facility: CLINIC | Age: 64
End: 2019-06-06

## 2019-06-06 NOTE — LETTER
"June 17, 2019    Breonna Silva  [facility]  [address]  [place] [address]             [facility]  [address]  [facility] [address] Dear Mrs. Breonna Noyola"  Ricardo:    Welcome to [facility] Complex Care Management Program.  It was a pleasure talking with you today.  My name is Salina Nuñez RN and I look forward to being your Care Manager.  My goal is to help you function at the healthiest and highest level possible.  You can contact me directly at [phone].     As an [facility] patient with [employer], some of the services we may be able to provide include:      Development of an individualized care plan with a Registered Nurse    Connection with a    Connection with available resources and services     Coordinate communication among your care team members    Provide coaching and education    Help you understand your doctors treatment plan   Help you obtain information about your insurance coverage.     All services provided by [facility] Complex Care Managers and other care team members are coordinated with and communicated to your primary care team.    As part of your enrollment, you will be receiving education materials and more information about these services in your [facility] account, by phone or through the mail.  If you do not wish to participate or receive information, please contact our office at [phone].      Sincerely,        Salina Nuñez RN  [facility]   Out-patient RN Complex Care Manager   TEL:  [phone]                                                                               -3-      [facility]:    Salina Nuñez RN, [facility]- [facility]-    Tel: [phone], Mon-Fri, 8 am- 4:30 pm  Ashly Oakes LCSW- [facility]  Tel:   [phone], Mon-Fri, 8 am- 4:30 pm  [facility] Main Office- TEL:  [phone]     Office Hours Mon-Fri, 8 am - 4:30 pm "   [facility] On Call, 24/7 Nurse Help Line (non emergent)- TEL:  [phone]        [employer] Donnell:    [employer] First 24 Hour Nurse Line--TEL:  [phone]  [employer] Gold- Over-the-Counter Benefit-- TEL:  [phone]  Debby Davies, [employer] Representative-- TEL:  [phone], ext [phone] can help to change [employer] plan to reflect either Diabetes or Heart Disease or both in order to lower co pay costs for medical appointments.   [employer] Donnell Silver Sneakers Exercise- [place], [facility]  [employer] Jacobo- Well Dine -TEL:  [phone].  Call once home within 30 days after a discharge from an Inpatient Stay at the Hospital, Rehab or Skilled Facility. 10 meals are delivered to your home from [employer]. All meals are Heart Healthy, Low Sodium.   [employer] Jacobo- Transportation Reservation-- TEL:  [phone]  Mon-Fri, 8 am-5 pm, 3 business days notice required.     If you have any questions or concerns, please don't hesitate to call.    Sincerely,        Salina Nuñez RN

## 2019-06-06 NOTE — LETTER
"June 17, 2019    Breonna Silva  [facility]  [address]  [place] [address]             [facility]  [address]  [place] [address] Dear Mrs. Ravi "Capri"  Ricardo:     I am a nurse  with [facility]'s Outpatient Case Management program.  The program is free and is made up of nurses and social workers who help connect patients to resources and education that can help them in managing their health. We received the referral through the [facility] in collaboration with Dr. Jarrell Del Valle, Primary Care Physician.     I called and spoke with you today and am so glad that you are feeling much better from when we spoke a week ago.   I was able to speak with nurse Johnson, Head Nurse at [facility] and learned that [facility] has started to provide you with care services.  [facility] will provide for all of your needs therefore there is no need for Outpatient Care Management so I will not continue to call.      It was a pleasure to speak with you and please direct any questions you may have to [facility]. Thank you.      Sincerely,        Salina Nuñez RN   [facility]  TEL: [phone]               "

## 2019-06-06 NOTE — TELEPHONE ENCOUNTER
"    Reason for Disposition   Sounds like a life-threatening emergency to the triager    Protocols used: ST NO PROTOCOL CALL: SICK ADULT-A-[place]    Breonna states she had "a procedure done" and now she is calling to cancel her post op visit with Carmencita Alatorre in ENT this morning.  She has cancer of soft palate, per record notes. She states she is in severe pain to her left head, ear, and neck, and no pain medication was called in for her, which she states she requested over the weekend.  She said she has not been able to eat, drink, and the pain in her ear especially is severe. Is very weak; says she can't walk, can't drive.  Can't remember the last time she ate.  Is in assisted living, and says she has her own apartment there, lives independently, is alone now.  Recommended she call 911 now for immediate medical attention, but she became upset and said she will not call 911 ("they will just hang up on me") and will not go to ED.  Just wants pain medication.  Message to Carmencita Alatorre NP in Otolaryngology.  Would benefit from case management / social service assessment for need.  Message also to Dr Del Valle, her PCP.  Please contact patient directly with any additional care advice at [phone].    "

## 2019-06-06 NOTE — TELEPHONE ENCOUNTER
----- Message from Caitlin Nick sent at 6/6/2019  7:15 AM CDT -----  Contact: self  Type:    Patient states need to speak with nurse.   Patient states feeling very sick unable to eat or drink anything severe headache unable to make appointment scheduled for this morning..  Name of Caller: Patient   When is the first available appointment?    Would the patient rather a call back or a response via [facility]? call  Best Call Back Number:[phone]  Additional Information: I also ring call to on call nurse to speak with patient

## 2019-06-11 ENCOUNTER — NURSE TRIAGE (OUTPATIENT)
Dept: ADMINISTRATIVE | Facility: CLINIC | Age: 64
End: 2019-06-11

## 2019-06-11 NOTE — TELEPHONE ENCOUNTER
"    Reason for Disposition   Difficult to awaken or acting confused (e.g., disoriented, slurred speech)    Protocols used:  NEUROLOGIC [facility]-[place]    Capri's call was transferred to triage line by OPCM RN,  Salina Nuñez.  She states she "feels uncoordinated", and her speech is very slurred. She has very vague report of "not feeling right", and talking about needing medication.  Per [facility] triage protocol, recommend she hang up the phone now and call 911 for immediate medical attention.  I reminded her that we spoke last Thursday, and recommendation at that time was that she call 911, but she declined on that day.  She said she recalled that conversation, and states she feels badly enough today to agree to call 911.  She did verify with me that she will call 911 now, saying "I need to get better".  Message to Jarrell Del Valle MD, pcp, Salina Nuñez, RN OPCM.  Please contact patient directly with any additional care advice.    "

## 2019-06-17 ENCOUNTER — TELEPHONE (OUTPATIENT)
Dept: INTERNAL MEDICINE | Facility: CLINIC | Age: 64
End: 2019-06-17

## 2019-06-17 ENCOUNTER — TELEPHONE (OUTPATIENT)
Dept: HEMATOLOGY/ONCOLOGY | Facility: CLINIC | Age: 64
End: 2019-06-17

## 2019-06-17 NOTE — PROGRESS NOTES
"6/17/19  Summary:  Patient referred to OPCM for high risk. Spoke with patient telephonically. Explained OPCM program. Patient in agreement to have OPCM assist & manage health issues. Pt doesn't provide much explanation for her condition last week. Pt refers to possible effects from taking her pain meds. Pt reports feeling much better today. Pt refers to just wanting to be left alone.  Completed initial screen/assessment/Med Rec/PHQ9.No med discrepancies found. PHQ9=18. Pt admits to depression feelings due to multiple comorbid conditions-ie Parkinson's, Vertigo, Seizures and now cancer. Pt states she is not SI since she is too chicken to harm herself. C/o left ear/throat chronic pain. Pertinent dxs- Anxiety, Psychological factors affecting medical condition/Cluster B personality disorder. S/P ED/HOSP- 5/26-5/29/19 for dysphagia, SCC Soft Palate. Dxd with tonsilar ca in April. Pt refused PEG placement when becoming unable to tolerate PO foods.   Pt is AAOx3, , lives in [facility] the [facility], has 3 meals/day. Pt likes the environment but takes offense to being told it is time to help her with her bath. Pt claims to be independent with her showering. Pt reports living independently with support of staff for household chores, ADLs which pt reports being independent. Pt reports taking Taxi to med appts plus has transport from Assisted Living. Pt denies having any family/friend support-- "They're all dead" pt states. One friend, Ms. Garcia is listed as a friend, is an  and has MPOA filed in pt record. Pt asks same question a few times, "When is she going to die?". Pt states she has been arrogant, cocky in her behavior but now she is feeling afraid and needs someone with whom to talk. Pt eats soft foods. Hem/Onc appts/labs 6/14 were cancelled by pt. No rescheduled appts.    Interventions:  Encouraged pt to speak with her Hem/Onc physician regarding any questions/concerns she has " "about her medical condition.   Encouraged pt safety & slow pacing with her mobility, to allow staff to assist in her care.   Spoke with nurse Elizabeth at [facility] to validate pt's level of care and supervision. Discovered at this time after phone encounter today that pt is receiving Hospice Care from [facility]. Pt was d/cd home from [facility] with [facility] and transferred to [facility] by pt's POA . [facility] began services today. Pt made no mention of Hospice services. Elizabeth says that Mrs. Swenson mentioned to her how she asked this RN CM, "When am I going to die?".  Mailed letter with contact info and plans to close.    Message sent to Dr. Calderon and Dr. Del Valle to inform of [facility]ice Care involvement.    Plan:  Case closure held ---in order to allow one f/u with pt under hospice care.  RN Supervisor advised.     Todays OPCM Self-Management Care Plan was developed with the patients/caregivers input and was based on identified barriers from todays assessment.  Goals were written today with the patient/caregiver and the patient has agreed to work towards these goals to improve his/her overall well-being. Patient verbalized understanding of the care plan, goals, and all of today's instructions. Encouraged patient/caregiver to communicate with his/her physician and health care team about health conditions and the treatment plan.  Provided my contact information today and encouraged patient/caregiver to call me with any questions as needed.  "

## 2019-06-17 NOTE — TELEPHONE ENCOUNTER
----- Message from Salina Nuñez RN sent at 6/17/2019  4:30 PM CDT -----  Contact: DAWOOD Leong and Ivon:    I have learned from the charge nurse at [facility] that . Cousins began receiving hospice care through [facility] effective today.     (An initial assessment was completed by Outpatient Care Management today as I was unaware of hospice services in place. I closed my case).     Thank you,  LENNOX Leong, RN, CCM [facility]  [email or web address]  TEL:  [phone]

## 2019-06-17 NOTE — TELEPHONE ENCOUNTER
----- Message from Salina Nuñez RN sent at 6/17/2019  1:26 PM CDT -----  Contact: DAWOOD Leong Dr./Staff:    Pt has answered her phone this PM and appears to be okay.     Thank you,  TISHA LeongN, RN, CCM [facility]  [email or web address]  TEL:  [phone]

## 2019-06-27 ENCOUNTER — OUTPATIENT CASE MANAGEMENT (OUTPATIENT)
Dept: ADMINISTRATIVE | Facility: OTHER | Age: 64
End: 2019-06-27

## 2019-08-22 ENCOUNTER — TELEPHONE (OUTPATIENT)
Dept: ADMINISTRATIVE | Facility: HOSPITAL | Age: 64
End: 2019-08-22

## 2020-01-03 NOTE — ASSESSMENT & PLAN NOTE
Left message on moms personal voicemail to return call to give her below information per dr daniels.    No recent seizures  Continue keppra 1 g BID

## 2020-06-30 NOTE — PROGRESS NOTES
[facility] is committed to your overall health.  To help you get the most out of each of your visits, we will review your information to make sure you are up to date on all of your recommended tests and/or procedures.       Your PCP  Jarrell Del Valle MD   found that you may be due for:       Health Maintenance Due   Topic Date Due    Zoster Vaccine  04/19/2015    Mammogram  03/02/2016    Pap Smear with HPV Cotest  03/02/2018     Medicare does not cover all immunizations to be given in the clinic. Check your benefits to ensure that you do not need to receive your immunizations at the pharmacy.          If you have had any of the above done at another facility, please bring the records or information with you so that your record at [facility] will be complete.  If you would like to schedule any of these, please contact me.     If you are currently taking medication, please bring it with you to your appointment for review.     Also, if you have any type of Advanced Directives, please bring them with you to your office visit so we may scan them into your chart.       Thank you for Choosing [facility] for your healthcare needs.        Additional Information  If you have questions, you can email [email or web address] or call [phone]  to talk to our [facility] staff. Remember, [facility] is NOT to be used for urgent needs. For medical emergencies, dial 911.       numerical 0-10

## 2020-08-06 ENCOUNTER — TELEPHONE (OUTPATIENT)
Dept: INTERNAL MEDICINE | Facility: CLINIC | Age: 65
End: 2020-08-06

## 2023-01-19 NOTE — TELEPHONE ENCOUNTER
"Call received from pt. States she has called 3 times this morning. She is still having left ear pain, which she saw Dr PATRICIA Saul for yesterday. She states that the pain is constant and has been going on for 3 weeks now. States that Dr Saul suggested a sleeping pill could help her, but there are no new rx at the pharmacy. She also didn't understand how a sleeping pill could be helpful. She does report that Toradol has helped in the past, but she is out of medication. Pt is very tearful and states "I can't live like this any longer." Reassurance offered and advised pt will seek advice from neuro and ENT.   " Cold/Sinus

## 2023-08-08 NOTE — TELEPHONE ENCOUNTER
----- Message from Kaye Chino sent at 10/9/2017  8:40 AM CDT -----  _  1st Request  _  2nd Request  _  3rd Request        Who: Aye from [facility]     Why: Requesting a call back in regards to the RX of vortioxetine (TRINTELLIX) 5 mg Tab,  They were able to get the denial overturned and it is now avail to the patient     What Number to Call Back: no need to call and they are faxing it to you    When to Expect a call back: (Within 24 hours)    Please return the call at earliest convenience. Thanks!                             details… soft/nontender/nondistended

## 2024-04-10 ENCOUNTER — TELEPHONE (OUTPATIENT)
Dept: ENDOSCOPY | Facility: HOSPITAL | Age: 69
End: 2024-04-10
Payer: MEDICARE

## 2024-04-10 NOTE — TELEPHONE ENCOUNTER
Returned patient call that was left on voicemail no answer left voicemail asking patient to call us back @ [phone]
